# Patient Record
Sex: MALE | Race: WHITE | Employment: FULL TIME | URBAN - NONMETROPOLITAN AREA
[De-identification: names, ages, dates, MRNs, and addresses within clinical notes are randomized per-mention and may not be internally consistent; named-entity substitution may affect disease eponyms.]

---

## 2017-08-24 VITALS
DIASTOLIC BLOOD PRESSURE: 96 MMHG | SYSTOLIC BLOOD PRESSURE: 130 MMHG | HEIGHT: 70 IN | BODY MASS INDEX: 42.52 KG/M2 | WEIGHT: 297 LBS | HEART RATE: 80 BPM

## 2017-08-24 DIAGNOSIS — E03.9 ACQUIRED HYPOTHYROIDISM: ICD-10-CM

## 2017-08-24 DIAGNOSIS — E66.01 MORBID OBESITY DUE TO EXCESS CALORIES (HCC): ICD-10-CM

## 2017-08-24 DIAGNOSIS — G47.33 OBSTRUCTIVE SLEEP APNEA: ICD-10-CM

## 2017-08-24 RX ORDER — LEVOTHYROXINE SODIUM 0.05 MG/1
50 TABLET ORAL DAILY
COMMUNITY
End: 2017-09-26 | Stop reason: ALTCHOICE

## 2017-08-25 ENCOUNTER — OFFICE VISIT (OUTPATIENT)
Dept: FAMILY MEDICINE CLINIC | Age: 22
End: 2017-08-25
Payer: COMMERCIAL

## 2017-08-25 VITALS
DIASTOLIC BLOOD PRESSURE: 90 MMHG | HEART RATE: 88 BPM | SYSTOLIC BLOOD PRESSURE: 124 MMHG | BODY MASS INDEX: 43.65 KG/M2 | WEIGHT: 313 LBS

## 2017-08-25 DIAGNOSIS — E78.2 MIXED HYPERLIPIDEMIA: ICD-10-CM

## 2017-08-25 DIAGNOSIS — E11.65 TYPE 2 DIABETES MELLITUS WITH HYPERGLYCEMIA, WITHOUT LONG-TERM CURRENT USE OF INSULIN (HCC): Primary | ICD-10-CM

## 2017-08-25 DIAGNOSIS — E03.9 ACQUIRED HYPOTHYROIDISM: ICD-10-CM

## 2017-08-25 DIAGNOSIS — E66.01 MORBID OBESITY DUE TO EXCESS CALORIES (HCC): ICD-10-CM

## 2017-08-25 DIAGNOSIS — G47.33 OBSTRUCTIVE SLEEP APNEA: ICD-10-CM

## 2017-08-25 DIAGNOSIS — Z23 NEED FOR INFLUENZA VACCINATION: ICD-10-CM

## 2017-08-25 LAB
CHOLESTEROL/HDL RATIO: 6.6 RATIO
CHOLESTEROL: 230 MG/DL
CREATININE, RANDOM: 204.3 MG/DL
HDL, DIRECT: 35 MG/DL
LDL CHOLESTEROL CALCULATED: 107.8 MG/DL
MICROALBUMIN UR-MCNC: 3 MG/DL
MICROALBUMIN/CREAT UR-RTO: 14.7 MCG/MG CR
TRIGL SERPL-MCNC: 436 MG/DL
TSH REFLEX FT4: 4.31 MIU/ML
VLDLC SERPL CALC-MCNC: 87 MG/DL

## 2017-08-25 PROCEDURE — 90471 IMMUNIZATION ADMIN: CPT | Performed by: FAMILY MEDICINE

## 2017-08-25 PROCEDURE — 99214 OFFICE O/P EST MOD 30 MIN: CPT | Performed by: FAMILY MEDICINE

## 2017-08-25 PROCEDURE — 90686 IIV4 VACC NO PRSV 0.5 ML IM: CPT | Performed by: FAMILY MEDICINE

## 2017-08-25 RX ORDER — ATORVASTATIN CALCIUM 10 MG/1
10 TABLET, FILM COATED ORAL DAILY
Qty: 30 TABLET | Refills: 3 | Status: SHIPPED | OUTPATIENT
Start: 2017-08-25 | End: 2018-01-22 | Stop reason: SDUPTHER

## 2017-08-25 RX ORDER — METFORMIN HYDROCHLORIDE EXTENDED-RELEASE TABLETS 500 MG/1
500 TABLET, FILM COATED, EXTENDED RELEASE ORAL
Qty: 30 TABLET | Refills: 3 | Status: SHIPPED | OUTPATIENT
Start: 2017-08-25 | End: 2018-01-04 | Stop reason: CLARIF

## 2017-08-25 RX ORDER — LISINOPRIL 2.5 MG/1
2.5 TABLET ORAL DAILY
Qty: 30 TABLET | Refills: 3 | Status: SHIPPED | OUTPATIENT
Start: 2017-08-25 | End: 2018-01-22 | Stop reason: SDUPTHER

## 2017-08-27 ASSESSMENT — ENCOUNTER SYMPTOMS
DIARRHEA: 1
ABDOMINAL PAIN: 1
RESPIRATORY NEGATIVE: 1
NAUSEA: 1
BLOOD IN STOOL: 0

## 2017-09-26 ENCOUNTER — OFFICE VISIT (OUTPATIENT)
Dept: FAMILY MEDICINE CLINIC | Age: 22
End: 2017-09-26
Payer: COMMERCIAL

## 2017-09-26 VITALS
OXYGEN SATURATION: 96 % | SYSTOLIC BLOOD PRESSURE: 124 MMHG | TEMPERATURE: 97.4 F | BODY MASS INDEX: 40.73 KG/M2 | WEIGHT: 292 LBS | DIASTOLIC BLOOD PRESSURE: 84 MMHG | HEART RATE: 114 BPM

## 2017-09-26 DIAGNOSIS — E03.9 ACQUIRED HYPOTHYROIDISM: ICD-10-CM

## 2017-09-26 DIAGNOSIS — E66.01 MORBID OBESITY DUE TO EXCESS CALORIES (HCC): ICD-10-CM

## 2017-09-26 DIAGNOSIS — G47.33 OBSTRUCTIVE SLEEP APNEA: ICD-10-CM

## 2017-09-26 DIAGNOSIS — E11.65 TYPE 2 DIABETES MELLITUS WITH HYPERGLYCEMIA, UNSPECIFIED LONG TERM INSULIN USE STATUS: Primary | ICD-10-CM

## 2017-09-26 DIAGNOSIS — E11.65 TYPE 2 DIABETES MELLITUS WITH HYPERGLYCEMIA, WITHOUT LONG-TERM CURRENT USE OF INSULIN (HCC): ICD-10-CM

## 2017-09-26 LAB
BILIRUBIN, POC: NORMAL
BLOOD URINE, POC: NORMAL
CLARITY, POC: CLEAR
COLOR, POC: YELLOW
GLUCOSE BLD-MCNC: 554 MG/DL
GLUCOSE URINE, POC: NORMAL
HBA1C MFR BLD: 10.3 %
KETONES, POC: NORMAL
LEUKOCYTE EST, POC: NORMAL
NITRITE, POC: NORMAL
PH, POC: 5
PROTEIN, POC: NORMAL
SPECIFIC GRAVITY, POC: 1.01
UROBILINOGEN, POC: NORMAL

## 2017-09-26 PROCEDURE — 83036 HEMOGLOBIN GLYCOSYLATED A1C: CPT | Performed by: FAMILY MEDICINE

## 2017-09-26 PROCEDURE — 82962 GLUCOSE BLOOD TEST: CPT | Performed by: FAMILY MEDICINE

## 2017-09-26 PROCEDURE — 99214 OFFICE O/P EST MOD 30 MIN: CPT | Performed by: FAMILY MEDICINE

## 2017-09-26 PROCEDURE — 81002 URINALYSIS NONAUTO W/O SCOPE: CPT | Performed by: FAMILY MEDICINE

## 2017-09-26 RX ORDER — BLOOD-GLUCOSE METER
1 KIT MISCELLANEOUS DAILY
Qty: 1 KIT | Refills: 0 | Status: SHIPPED | OUTPATIENT
Start: 2017-09-26

## 2017-09-26 ASSESSMENT — ENCOUNTER SYMPTOMS: RESPIRATORY NEGATIVE: 1

## 2017-10-09 ENCOUNTER — OFFICE VISIT (OUTPATIENT)
Dept: FAMILY MEDICINE CLINIC | Age: 22
End: 2017-10-09
Payer: COMMERCIAL

## 2017-10-09 VITALS
HEART RATE: 68 BPM | SYSTOLIC BLOOD PRESSURE: 116 MMHG | BODY MASS INDEX: 40.59 KG/M2 | WEIGHT: 291 LBS | DIASTOLIC BLOOD PRESSURE: 88 MMHG

## 2017-10-09 DIAGNOSIS — R94.39 ABNORMAL NUCLEAR STRESS TEST: ICD-10-CM

## 2017-10-09 DIAGNOSIS — E66.01 MORBID OBESITY DUE TO EXCESS CALORIES (HCC): ICD-10-CM

## 2017-10-09 DIAGNOSIS — E11.65 TYPE 2 DIABETES MELLITUS WITH HYPERGLYCEMIA, WITH LONG-TERM CURRENT USE OF INSULIN (HCC): Primary | ICD-10-CM

## 2017-10-09 DIAGNOSIS — E03.9 ACQUIRED HYPOTHYROIDISM: ICD-10-CM

## 2017-10-09 DIAGNOSIS — E11.65 TYPE 2 DIABETES MELLITUS WITH HYPERGLYCEMIA, UNSPECIFIED LONG TERM INSULIN USE STATUS: ICD-10-CM

## 2017-10-09 DIAGNOSIS — Z79.4 TYPE 2 DIABETES MELLITUS WITH HYPERGLYCEMIA, WITH LONG-TERM CURRENT USE OF INSULIN (HCC): Primary | ICD-10-CM

## 2017-10-09 DIAGNOSIS — G47.33 OBSTRUCTIVE SLEEP APNEA: ICD-10-CM

## 2017-10-09 PROCEDURE — 99214 OFFICE O/P EST MOD 30 MIN: CPT | Performed by: FAMILY MEDICINE

## 2017-10-11 ASSESSMENT — ENCOUNTER SYMPTOMS
DIARRHEA: 0
BLOOD IN STOOL: 0
RESPIRATORY NEGATIVE: 1
CHOKING: 0
ABDOMINAL PAIN: 0
NAUSEA: 0

## 2017-10-11 NOTE — PROGRESS NOTES
2013    ARM SURGERY Left 2006    left arm reconstruction - fracture    UPPER GASTROINTESTINAL ENDOSCOPY  2014    [employer] for hematemesis; found hiatal hernia    UPPER GASTROINTESTINAL ENDOSCOPY  05/03/2016    moderate gastritis     Family History   Problem Relation Age of Onset    High Blood Pressure Mother     Heart Attack Mother     High Blood Pressure Father     Heart Attack Maternal Grandmother     Heart Attack Maternal Grandfather     Diabetes Maternal Grandfather      Social History   Substance Use Topics    Smoking status: Never Smoker    Smokeless tobacco: Former User      Comment: MIKE Domingo RRT 5/3/2016    Alcohol use 0.0 oz/week      Comment: occasional        Current Outpatient Prescriptions   Medication Sig Dispense Refill    insulin glargine (TOUJEO SOLOSTAR) 300 UNIT/ML injection pen Inject 35 Units into the skin nightly Increase by 3 units every 5 days until fasting sugar is less than 120 3 Pen 3    insulin aspart (NOVOLOG FLEXPEN) 100 UNIT/ML injection pen Use 3 units for every 50 above 150 before meals 5 Pen 3    glucose blood VI test strips (ASCENSIA AUTODISC VI;ONE TOUCH ULTRA TEST VI) strip Check blood 3 times daily before meals 100 each 3    insulin glargine (TOUJEO SOLOSTAR) 300 UNIT/ML injection pen Inject 20 units every evening; check sugars twice daily ; increase dose every 5 days by 3 units until fasting sugar is 100-150 (Patient taking differently: 35 Units nightly Inject 35 units every evening; check sugars twice daily ; increase dose every 5 days by 3 units until fasting sugar is 100-150) 1 Pen 0    Insulin Pen Needle 32G X 4 MM MISC 1 each by Does not apply route daily 100 each 3    glucose monitoring kit (FREESTYLE) monitoring kit 1 kit by Does not apply route daily 1 kit 0    metFORMIN, OSM, (FORTAMET) 500 MG extended release tablet Take 1 tablet by mouth daily (with breakfast) 30 tablet 3    lisinopril (PRINIVIL;ZESTRIL) 2.5 MG tablet Take 1 tablet by mouth daily 30 tablet 3    atorvastatin (LIPITOR) 10 MG tablet Take 1 tablet by mouth daily 30 tablet 3    aspirin 81 MG tablet Take 81 mg by mouth daily Indications: delayed release tablet      acetaminophen 650 MG TABS Take 650 mg by mouth every 4 hours as needed 120 tablet 0    omeprazole (PRILOSEC) 40 MG capsule Take 1 capsule twice daily for 2 weeks, then 1 tab daily 60 capsule 0     No current facility-administered medications for this visit. Allergies   Allergen Reactions    Cephalexin Hives    Sulfa Antibiotics Hives       Health Maintenance   Topic Date Due    Diabetic foot exam  10/07/2004    Diabetic retinal exam  10/07/2004    HIV screen  10/07/2009    DTaP/Tdap/Td vaccine (1 - Tdap) 10/07/2013    Pneumococcal med risk (1 of 1 - PPSV23) 10/07/2013    Diabetic hemoglobin A1C test  12/26/2017    Diabetic microalbuminuria test  08/25/2018    Lipid screen  08/25/2018    Flu vaccine  Completed       Subjective:      Review of Systems   Constitutional: Negative for appetite change, chills, diaphoresis and fever. HENT: Negative. Respiratory: Negative. Negative for choking. Cardiovascular: Negative for chest pain, palpitations and leg swelling. Gastrointestinal: Negative for abdominal pain, blood in stool, diarrhea and nausea (improving). Endocrine: Negative for cold intolerance, heat intolerance, polydipsia, polyphagia and polyuria. Objective:     /88   Pulse 68   Wt 291 lb (132 kg)   BMI 40.59 kg/m²     Physical Exam   Constitutional: He appears well-developed and well-nourished. No distress. obese   HENT:   Mouth/Throat: No oropharyngeal exudate. Constricted oropharynx   Neck: Neck supple. Carotid bruit is not present. Cardiovascular: Regular rhythm, S1 normal and S2 normal.    No murmur heard. Pulmonary/Chest: Breath sounds normal. He has no wheezes. He has no rhonchi. He has no rales. Abdominal: Soft. Bowel sounds are normal. There is no hepatosplenomegaly. Lymphadenopathy:     He has no axillary adenopathy. Vitals reviewed. Assessment:     1. Type 2 diabetes mellitus with hyperglycemia, with long-term current use of insulin ([facility])  insulin glargine (TOUJEO SOLOSTAR) 300 UNIT/ML injection pen    insulin aspart (NOVOLOG FLEXPEN) 100 UNIT/ML injection pen    External Referral To Diabetic Education   2. Type 2 diabetes mellitus with hyperglycemia, unspecified long term insulin use status  glucose blood VI test strips (ASCENSIA AUTODISC VI;ONE TOUCH ULTRA TEST VI) strip   3. Acquired hypothyroidism     4. Morbid obesity due to excess calories ([address]     5. Obstructive sleep apnea      referral made to Dr Shin Solorio   6. Abnormal nuclear stress test      2015            POC Testing Results (If Applicable):  No results found for this visit on 10/09/17. Plan:      At this point in time we will need to add short acting insulin for meals  Want before meals only ; not before bedtime  Will also increased toujeo  Reviewed how to increase toujeo every 5 days; 3 units every 5 days until FBS   Reviewed sliding scale coverage AC only   Refer to diabetic education/dietician   Orders Given:  Orders Placed This Encounter   Procedures    External Referral To Diabetic Education     Referral Priority:   Routine     Referral Type:   Consult for Advice and Opinion     Referral Reason:   Specialty Services Required     Requested Specialty:   Dietitian     Number of Visits Requested:   1     Prescriptions:    Orders Placed This Encounter   Medications    insulin glargine (TOUJEO SOLOSTAR) 300 UNIT/ML injection pen     Sig: Inject 35 Units into the skin nightly Increase by 3 units every 5 days until fasting sugar is less than 120     Dispense:  3 Pen     Refill:  3    insulin aspart (NOVOLOG FLEXPEN) 100 UNIT/ML injection pen     Sig: Use 3 units for every 50 above 150 before meals     Dispense:  5 Pen     Refill:  3    glucose blood VI test strips ([address] ULTRA TEST VI) strip     Sig: Check blood 3 times daily before meals     Dispense:  100 each     Refill:  3        Return in about 5 weeks (around 11/13/2017). Electronically signed by Zac Cosby MD on 10/11/2017.

## 2017-12-13 DIAGNOSIS — Z79.4 TYPE 2 DIABETES MELLITUS WITH HYPERGLYCEMIA, WITH LONG-TERM CURRENT USE OF INSULIN (HCC): ICD-10-CM

## 2017-12-13 DIAGNOSIS — E11.65 TYPE 2 DIABETES MELLITUS WITH HYPERGLYCEMIA, WITH LONG-TERM CURRENT USE OF INSULIN (HCC): ICD-10-CM

## 2017-12-13 NOTE — TELEPHONE ENCOUNTER
Josi Young is calling to request a refill on the following medication(s):  Requested Prescriptions     Pending Prescriptions Disp Refills    insulin aspart (NOVOLOG FLEXPEN) 100 UNIT/ML injection pen 5 pen 3     Sig: Use 3 units for every 50 above 150 before meals       Last Visit Date (If Applicable):  25/2/6232    Next Visit Date:    Visit date not found

## 2017-12-14 ENCOUNTER — OFFICE VISIT (OUTPATIENT)
Dept: FAMILY MEDICINE CLINIC | Age: 22
End: 2017-12-14
Payer: COMMERCIAL

## 2017-12-14 VITALS
BODY MASS INDEX: 39.2 KG/M2 | HEIGHT: 71 IN | DIASTOLIC BLOOD PRESSURE: 78 MMHG | HEART RATE: 100 BPM | SYSTOLIC BLOOD PRESSURE: 124 MMHG | WEIGHT: 280 LBS

## 2017-12-14 DIAGNOSIS — G47.33 OBSTRUCTIVE SLEEP APNEA: ICD-10-CM

## 2017-12-14 DIAGNOSIS — Z79.4 TYPE 2 DIABETES MELLITUS WITH HYPERGLYCEMIA, WITH LONG-TERM CURRENT USE OF INSULIN (HCC): ICD-10-CM

## 2017-12-14 DIAGNOSIS — K92.0 GASTROINTESTINAL HEMORRHAGE WITH HEMATEMESIS: ICD-10-CM

## 2017-12-14 DIAGNOSIS — E11.8 TYPE 2 DIABETES MELLITUS WITH COMPLICATION, WITH LONG-TERM CURRENT USE OF INSULIN (HCC): Primary | ICD-10-CM

## 2017-12-14 DIAGNOSIS — E03.9 ACQUIRED HYPOTHYROIDISM: ICD-10-CM

## 2017-12-14 DIAGNOSIS — E66.01 MORBID OBESITY DUE TO EXCESS CALORIES (HCC): ICD-10-CM

## 2017-12-14 DIAGNOSIS — K92.2 GASTROINTESTINAL HEMORRHAGE, UNSPECIFIED GASTROINTESTINAL HEMORRHAGE TYPE: ICD-10-CM

## 2017-12-14 DIAGNOSIS — Z79.4 TYPE 2 DIABETES MELLITUS WITH COMPLICATION, WITH LONG-TERM CURRENT USE OF INSULIN (HCC): Primary | ICD-10-CM

## 2017-12-14 DIAGNOSIS — E11.65 TYPE 2 DIABETES MELLITUS WITH HYPERGLYCEMIA, WITH LONG-TERM CURRENT USE OF INSULIN (HCC): ICD-10-CM

## 2017-12-14 LAB — HBA1C MFR BLD: 13.3 %

## 2017-12-14 PROCEDURE — 99214 OFFICE O/P EST MOD 30 MIN: CPT | Performed by: FAMILY MEDICINE

## 2017-12-14 PROCEDURE — 83036 HEMOGLOBIN GLYCOSYLATED A1C: CPT | Performed by: FAMILY MEDICINE

## 2017-12-14 NOTE — PROGRESS NOTES
GERD (gastroesophageal reflux disease)     Hyperlipidemia     Hypothyroidism       Past Surgical History:   Procedure Laterality Date    APPENDECTOMY  2013    ARM SURGERY Left 2006    left arm reconstruction - fracture    UPPER GASTROINTESTINAL ENDOSCOPY  2014    [facility] for hematemesis; found hiatal hernia    UPPER GASTROINTESTINAL ENDOSCOPY  05/03/2016    moderate gastritis     Family History   Problem Relation Age of Onset    High Blood Pressure Mother     Heart Attack Mother     High Blood Pressure Father     Heart Attack Maternal Grandmother     Heart Attack Maternal Grandfather     Diabetes Maternal Grandfather      Social History   Substance Use Topics    Smoking status: Never Smoker    Smokeless tobacco: Former User      Comment: MIKE Domingo RRT 5/3/2016    Alcohol use 0.0 oz/week      Comment: occasional        Current Outpatient Prescriptions   Medication Sig Dispense Refill    insulin aspart (NOVOLOG FLEXPEN) 100 UNIT/ML injection pen Use 5 units for every 50 above 150 before meals.  Max dose is: 100 units /day 10 pen 3    dapagliflozin (FARXIGA) 10 MG tablet Take 1 tablet by mouth every morning 30 tablet 3    glucose blood VI test strips (ASCENSIA AUTODISC VI;ONE TOUCH ULTRA TEST VI) strip Check blood 3 times daily before meals 100 each 3    insulin glargine (TOUJEO SOLOSTAR) 300 UNIT/ML injection pen Inject 20 units every evening; check sugars twice daily ; increase dose every 5 days by 3 units until fasting sugar is 100-150 (Patient taking differently: 35 Units nightly Inject 50 units every evening; check sugars twice daily ; increase dose every 5 days by 3 units until fasting sugar is 100-150) 1 Pen 0    Insulin Pen Needle 32G X 4 MM MISC 1 each by Does not apply route daily 100 each 3    glucose monitoring kit (FREESTYLE) monitoring kit 1 kit by Does not apply route daily 1 kit 0    metFORMIN, OSM, (FORTAMET) 500 MG extended release tablet Take 1 tablet by mouth daily (with supple. Carotid bruit is not present. Cardiovascular: Regular rhythm, S1 normal and S2 normal.    No murmur heard. Pulmonary/Chest: Breath sounds normal. He has no wheezes. He has no rhonchi. He has no rales. Abdominal: Soft. Bowel sounds are normal. There is no hepatosplenomegaly. Lymphadenopathy:     He has no axillary adenopathy. Vitals reviewed. Assessment:     1. Type 2 diabetes mellitus with complication, with long-term current use of insulin ([facility])  POCT glycosylated hemoglobin (Hb A1C)   2. Type 2 diabetes mellitus with hyperglycemia, with long-term current use of insulin ([facility])  insulin aspart (NOVOLOG FLEXPEN) 100 UNIT/ML injection pen    dapagliflozin (FARXIGA) 10 MG tablet   3. Acquired hypothyroidism     4. Gastrointestinal hemorrhage with hematemesis     5. Gastrointestinal hemorrhage, unspecified gastrointestinal hemorrhage type     6. Morbid obesity due to excess calories ([address]     7. Obstructive sleep apnea              POC Testing Results (If Applicable):  Results for POC orders placed in visit on 12/14/17   POCT glycosylated hemoglobin (Hb A1C)   Result Value Ref Range    Hemoglobin A1C 13.3 %       Plan:   Increase SS insulin ; Check blood sugars around 3 AM to rule out nocturnal hypoglycemia   Start farxiga  If not gaining control shortly - endocrinology referrral   Find out about diabetic ed and pulmonology referral         Orders Given:  Orders Placed This Encounter   Procedures    POCT glycosylated hemoglobin (Hb A1C)     Prescriptions:    Orders Placed This Encounter   Medications    insulin aspart (NOVOLOG FLEXPEN) 100 UNIT/ML injection pen     Sig: Use 5 units for every 50 above 150 before meals. Max dose is: 100 units /day     Dispense:  10 pen     Refill:  3    dapagliflozin (FARXIGA) 10 MG tablet     Sig: Take 1 tablet by mouth every morning     Dispense:  30 tablet     Refill:  3        Return in about 3 weeks (around 1/4/2018).       Electronically signed by Minnie Bence

## 2017-12-15 ENCOUNTER — TELEPHONE (OUTPATIENT)
Dept: FAMILY MEDICINE CLINIC | Age: 22
End: 2017-12-15

## 2017-12-15 DIAGNOSIS — E11.65 TYPE 2 DIABETES MELLITUS WITH HYPERGLYCEMIA, WITH LONG-TERM CURRENT USE OF INSULIN (HCC): Primary | ICD-10-CM

## 2017-12-15 DIAGNOSIS — Z79.4 TYPE 2 DIABETES MELLITUS WITH HYPERGLYCEMIA, WITH LONG-TERM CURRENT USE OF INSULIN (HCC): Primary | ICD-10-CM

## 2017-12-18 ASSESSMENT — ENCOUNTER SYMPTOMS
BLOOD IN STOOL: 0
DIARRHEA: 0
NAUSEA: 0
CHOKING: 0
ABDOMINAL PAIN: 0
RESPIRATORY NEGATIVE: 1

## 2017-12-21 RX ORDER — METFORMIN HYDROCHLORIDE 500 MG/1
TABLET, EXTENDED RELEASE ORAL
Qty: 30 TABLET | Refills: 3 | Status: SHIPPED | OUTPATIENT
Start: 2017-12-21 | End: 2018-04-05 | Stop reason: SDUPTHER

## 2017-12-21 NOTE — TELEPHONE ENCOUNTER
Satish Thompson is calling to request a refill on the following medication(s):  Requested Prescriptions     Pending Prescriptions Disp Refills    metFORMIN (GLUCOPHAGE-XR) 500 MG extended release tablet [Pharmacy Med Name: MetFORMIN ER 500MG  TAB] 30 tablet 3     Sig: TAKE ONE TABLET BY MOUTH ONCE DAILY WITH  BREAKFAST       Last Visit Date (If Applicable):  75/35/9212    Next Visit Date:    1/4/2018

## 2017-12-22 ENCOUNTER — TELEPHONE (OUTPATIENT)
Dept: FAMILY MEDICINE CLINIC | Age: 22
End: 2017-12-22

## 2017-12-27 NOTE — TELEPHONE ENCOUNTER
Spoke with [employer] today. She did say she remembers that ref coming on the fax machine but the pt was  Not seen. She stated that she wanted another copy of the ref. I did fax ref and info again.

## 2017-12-27 NOTE — TELEPHONE ENCOUNTER
All info was in the notes of ref. Pt was scheduled back in august with Dr. Nusrat Cox. appt was supposed to be for 9/26 at 9:40. I can call Dr. Tiffanie Valdes office to see if the pt did show up for his appt.

## 2018-01-04 ENCOUNTER — OFFICE VISIT (OUTPATIENT)
Dept: FAMILY MEDICINE CLINIC | Age: 23
End: 2018-01-04
Payer: COMMERCIAL

## 2018-01-04 VITALS
WEIGHT: 283 LBS | BODY MASS INDEX: 39.47 KG/M2 | SYSTOLIC BLOOD PRESSURE: 120 MMHG | HEART RATE: 68 BPM | DIASTOLIC BLOOD PRESSURE: 84 MMHG

## 2018-01-04 DIAGNOSIS — G47.33 OBSTRUCTIVE SLEEP APNEA: ICD-10-CM

## 2018-01-04 DIAGNOSIS — E78.2 MIXED HYPERLIPIDEMIA: ICD-10-CM

## 2018-01-04 DIAGNOSIS — Z79.4 TYPE 2 DIABETES MELLITUS WITH COMPLICATION, WITH LONG-TERM CURRENT USE OF INSULIN (HCC): Primary | ICD-10-CM

## 2018-01-04 DIAGNOSIS — E11.8 TYPE 2 DIABETES MELLITUS WITH COMPLICATION, WITH LONG-TERM CURRENT USE OF INSULIN (HCC): Primary | ICD-10-CM

## 2018-01-04 DIAGNOSIS — E03.9 ACQUIRED HYPOTHYROIDISM: ICD-10-CM

## 2018-01-04 PROCEDURE — 99214 OFFICE O/P EST MOD 30 MIN: CPT | Performed by: FAMILY MEDICINE

## 2018-01-07 ASSESSMENT — ENCOUNTER SYMPTOMS
ABDOMINAL PAIN: 0
BLOOD IN STOOL: 0
RESPIRATORY NEGATIVE: 1
CHOKING: 0
NAUSEA: 0
DIARRHEA: 0

## 2018-01-07 NOTE — PROGRESS NOTES
Cephalexin Hives    Sulfa Antibiotics Hives       Health Maintenance   Topic Date Due    Diabetic foot exam  10/07/2004    Diabetic retinal exam  10/07/2004    DTaP/Tdap/Td vaccine (1 - Tdap) 10/07/2013    Pneumococcal med risk (1 of 1 - PPSV23) 10/07/2013    HIV screen  12/14/2027 (Originally 10/7/2009)    A1C test (Diabetic or Prediabetic)  03/14/2018    Potassium monitoring  08/21/2018    Creatinine monitoring  08/21/2018    Diabetic microalbuminuria test  08/25/2018    Lipid screen  08/25/2018    TSH testing  08/25/2018    Flu vaccine  Completed       Subjective:      Review of Systems   Constitutional: Negative for appetite change, chills, diaphoresis and fever. HENT: Negative. Respiratory: Negative. Negative for choking. Cardiovascular: Negative for chest pain, palpitations and leg swelling. Gastrointestinal: Negative for abdominal pain, blood in stool, diarrhea and nausea (improving). Endocrine: Negative for cold intolerance, heat intolerance, polydipsia, polyphagia and polyuria. Objective:     /84   Pulse 68   Wt 283 lb (128.4 kg)   BMI 39.47 kg/m²     Physical Exam   Constitutional: He appears well-developed and well-nourished. No distress. obese   HENT:   Mouth/Throat: No oropharyngeal exudate. Constricted oropharynx   Neck: Neck supple. Carotid bruit is not present. Cardiovascular: Regular rhythm, S1 normal and S2 normal.    No murmur heard. Pulmonary/Chest: Breath sounds normal. He has no wheezes. He has no rhonchi. He has no rales. Abdominal: Soft. Bowel sounds are normal. There is no hepatosplenomegaly. Lymphadenopathy:     He has no axillary adenopathy. Vitals reviewed. Assessment:     1. Type 2 diabetes mellitus with complication, with long-term current use of insulin ([facility])  insulin glargine (TOUJEO SOLOSTAR) 300 UNIT/ML injection pen   2. Obstructive sleep apnea  External Referral To Pulmonology   3. Acquired hypothyroidism     4.  Mixed hyperlipidemia  Lipid Panel            POC Testing Results (If Applicable):  No results found for this visit on 01/04/18. Plan:   Discussed hypoglycemia and need to carry glucose   We will make referral to Dr Tatum Sibley  We make sure he sees dietician       Orders Given:  Orders Placed This Encounter   Procedures    Lipid Panel     Standing Status:   Future     Standing Expiration Date:   1/4/2019     Order Specific Question:   Is Patient Fasting?/# of Hours     Answer:   Yes, 12 hours    External Referral To Pulmonology     Referral Priority:   Routine     Referral Type:   Consult for Advice and Opinion     Referral Reason:   Specialty Services Required     Referred to Provider:   Uli Cook MD     Requested Specialty:   Pulmonary Disease     Number of Visits Requested:   1     Prescriptions:    Orders Placed This Encounter   Medications    insulin glargine (TOUJEO SOLOSTAR) 300 UNIT/ML injection pen     Sig: Inject 70 Units into the skin nightly     Dispense:  3 pen     Refill:  3        Return in about 3 months (around 4/4/2018). Electronically signed by Adry Louis MD on 1/7/2018.

## 2018-01-08 ENCOUNTER — TELEPHONE (OUTPATIENT)
Dept: FAMILY MEDICINE CLINIC | Age: 23
End: 2018-01-08

## 2018-01-22 ENCOUNTER — TELEPHONE (OUTPATIENT)
Dept: FAMILY MEDICINE CLINIC | Age: 23
End: 2018-01-22

## 2018-01-22 DIAGNOSIS — E11.65 TYPE 2 DIABETES MELLITUS WITH HYPERGLYCEMIA, WITHOUT LONG-TERM CURRENT USE OF INSULIN (HCC): ICD-10-CM

## 2018-01-22 DIAGNOSIS — E78.2 MIXED HYPERLIPIDEMIA: ICD-10-CM

## 2018-01-22 RX ORDER — LISINOPRIL 2.5 MG/1
TABLET ORAL
Qty: 30 TABLET | Refills: 0 | Status: SHIPPED | OUTPATIENT
Start: 2018-01-22 | End: 2018-03-01 | Stop reason: SDUPTHER

## 2018-01-22 RX ORDER — ATORVASTATIN CALCIUM 10 MG/1
TABLET, FILM COATED ORAL
Qty: 30 TABLET | Refills: 0 | Status: SHIPPED | OUTPATIENT
Start: 2018-01-22 | End: 2019-01-15 | Stop reason: DRUGHIGH

## 2018-01-22 NOTE — TELEPHONE ENCOUNTER
luma wanted to let you know that she found Philip referral down in registration. He does have an appointment on Friday.

## 2018-01-23 LAB
CHOLESTEROL/HDL RATIO: 5 RATIO
CHOLESTEROL: 170 MG/DL
HDL, DIRECT: 34 MG/DL
LDL CHOLESTEROL CALCULATED: 89.2 MG/DL
TRIGL SERPL-MCNC: 234 MG/DL
VLDLC SERPL CALC-MCNC: 47 MG/DL

## 2018-01-31 DIAGNOSIS — E78.5 HYPERLIPIDEMIA, UNSPECIFIED HYPERLIPIDEMIA TYPE: Primary | ICD-10-CM

## 2018-01-31 RX ORDER — ATORVASTATIN CALCIUM 10 MG/1
10 TABLET, FILM COATED ORAL DAILY
Qty: 90 TABLET | Refills: 3 | Status: SHIPPED | OUTPATIENT
Start: 2018-01-31 | End: 2018-04-05 | Stop reason: CLARIF

## 2018-02-26 ENCOUNTER — TELEPHONE (OUTPATIENT)
Dept: FAMILY MEDICINE CLINIC | Age: 23
End: 2018-02-26

## 2018-03-01 DIAGNOSIS — E11.65 TYPE 2 DIABETES MELLITUS WITH HYPERGLYCEMIA, WITHOUT LONG-TERM CURRENT USE OF INSULIN (HCC): ICD-10-CM

## 2018-03-01 RX ORDER — LISINOPRIL 2.5 MG/1
TABLET ORAL
Qty: 30 TABLET | Refills: 0 | Status: SHIPPED | OUTPATIENT
Start: 2018-03-01 | End: 2018-06-12 | Stop reason: SDUPTHER

## 2018-03-01 NOTE — TELEPHONE ENCOUNTER
[facility] is calling to request a refill on the following medication(s):  Requested Prescriptions     Pending Prescriptions Disp Refills    lisinopril (PRINIVIL;ZESTRIL) 2.5 MG tablet [Pharmacy Med Name: LISINOPRIL 2.5MG    TAB] 30 tablet 0     Sig: TAKE ONE TABLET BY MOUTH ONCE DAILY       Last Visit Date (If Applicable):  5/4/6843    Next Visit Date:    4/5/2018

## 2018-04-05 ENCOUNTER — OFFICE VISIT (OUTPATIENT)
Dept: FAMILY MEDICINE CLINIC | Age: 23
End: 2018-04-05
Payer: MEDICARE

## 2018-04-05 VITALS
HEART RATE: 72 BPM | SYSTOLIC BLOOD PRESSURE: 122 MMHG | WEIGHT: 304 LBS | BODY MASS INDEX: 42.4 KG/M2 | DIASTOLIC BLOOD PRESSURE: 90 MMHG

## 2018-04-05 DIAGNOSIS — S43.302A SUBLUXATION OF LEFT SHOULDER GIRDLE, INITIAL ENCOUNTER: ICD-10-CM

## 2018-04-05 DIAGNOSIS — E03.9 ACQUIRED HYPOTHYROIDISM: ICD-10-CM

## 2018-04-05 DIAGNOSIS — K92.0 GASTROINTESTINAL HEMORRHAGE WITH HEMATEMESIS: ICD-10-CM

## 2018-04-05 DIAGNOSIS — E66.01 MORBID OBESITY DUE TO EXCESS CALORIES (HCC): ICD-10-CM

## 2018-04-05 DIAGNOSIS — Z79.4 TYPE 2 DIABETES MELLITUS WITH HYPERGLYCEMIA, WITH LONG-TERM CURRENT USE OF INSULIN (HCC): Primary | ICD-10-CM

## 2018-04-05 DIAGNOSIS — G47.33 OBSTRUCTIVE SLEEP APNEA: ICD-10-CM

## 2018-04-05 DIAGNOSIS — E11.65 TYPE 2 DIABETES MELLITUS WITH HYPERGLYCEMIA, WITH LONG-TERM CURRENT USE OF INSULIN (HCC): Primary | ICD-10-CM

## 2018-04-05 LAB — HBA1C MFR BLD: 7.5 %

## 2018-04-05 PROCEDURE — 83036 HEMOGLOBIN GLYCOSYLATED A1C: CPT | Performed by: FAMILY MEDICINE

## 2018-04-05 PROCEDURE — 1036F TOBACCO NON-USER: CPT | Performed by: FAMILY MEDICINE

## 2018-04-05 PROCEDURE — G8427 DOCREV CUR MEDS BY ELIG CLIN: HCPCS | Performed by: FAMILY MEDICINE

## 2018-04-05 PROCEDURE — G8417 CALC BMI ABV UP PARAM F/U: HCPCS | Performed by: FAMILY MEDICINE

## 2018-04-05 PROCEDURE — 99214 OFFICE O/P EST MOD 30 MIN: CPT | Performed by: FAMILY MEDICINE

## 2018-04-05 PROCEDURE — 3045F PR MOST RECENT HEMOGLOBIN A1C LEVEL 7.0-9.0%: CPT | Performed by: FAMILY MEDICINE

## 2018-04-05 RX ORDER — METFORMIN HYDROCHLORIDE 500 MG/1
500 TABLET, EXTENDED RELEASE ORAL
Qty: 90 TABLET | Refills: 3 | Status: SHIPPED | OUTPATIENT
Start: 2018-04-05 | End: 2019-04-16 | Stop reason: SDUPTHER

## 2018-04-05 ASSESSMENT — PATIENT HEALTH QUESTIONNAIRE - PHQ9
1. LITTLE INTEREST OR PLEASURE IN DOING THINGS: 0
2. FEELING DOWN, DEPRESSED OR HOPELESS: 0
SUM OF ALL RESPONSES TO PHQ9 QUESTIONS 1 & 2: 0
SUM OF ALL RESPONSES TO PHQ QUESTIONS 1-9: 0

## 2018-04-08 ASSESSMENT — ENCOUNTER SYMPTOMS
BLOOD IN STOOL: 0
CHOKING: 0
DIARRHEA: 0
NAUSEA: 0
ABDOMINAL PAIN: 0
RESPIRATORY NEGATIVE: 1

## 2018-05-02 DIAGNOSIS — Z79.4 TYPE 2 DIABETES MELLITUS WITH HYPERGLYCEMIA, WITH LONG-TERM CURRENT USE OF INSULIN (HCC): ICD-10-CM

## 2018-05-02 DIAGNOSIS — E11.65 TYPE 2 DIABETES MELLITUS WITH HYPERGLYCEMIA, WITH LONG-TERM CURRENT USE OF INSULIN (HCC): ICD-10-CM

## 2018-05-03 RX ORDER — DAPAGLIFLOZIN 10 MG/1
TABLET, FILM COATED ORAL
Qty: 30 TABLET | Refills: 3 | Status: SHIPPED | OUTPATIENT
Start: 2018-05-03 | End: 2018-11-20 | Stop reason: SDUPTHER

## 2018-05-10 DIAGNOSIS — S43.302S SUBLUXATION OF LEFT SHOULDER GIRDLE, SEQUELA: Primary | ICD-10-CM

## 2018-05-11 ENCOUNTER — OFFICE VISIT (OUTPATIENT)
Dept: ORTHOPEDIC SURGERY | Age: 23
End: 2018-05-11
Payer: COMMERCIAL

## 2018-05-11 VITALS
SYSTOLIC BLOOD PRESSURE: 126 MMHG | BODY MASS INDEX: 43.52 KG/M2 | HEIGHT: 70 IN | DIASTOLIC BLOOD PRESSURE: 88 MMHG | WEIGHT: 304 LBS

## 2018-05-11 DIAGNOSIS — M24.112 LABRAL TEAR OF SHOULDER, DEGENERATIVE, LEFT: Primary | ICD-10-CM

## 2018-05-11 PROCEDURE — 1036F TOBACCO NON-USER: CPT | Performed by: ORTHOPAEDIC SURGERY

## 2018-05-11 PROCEDURE — G8428 CUR MEDS NOT DOCUMENT: HCPCS | Performed by: ORTHOPAEDIC SURGERY

## 2018-05-11 PROCEDURE — 99203 OFFICE O/P NEW LOW 30 MIN: CPT | Performed by: ORTHOPAEDIC SURGERY

## 2018-05-11 PROCEDURE — G8417 CALC BMI ABV UP PARAM F/U: HCPCS | Performed by: ORTHOPAEDIC SURGERY

## 2018-05-25 ENCOUNTER — OFFICE VISIT (OUTPATIENT)
Dept: ORTHOPEDIC SURGERY | Age: 23
End: 2018-05-25
Payer: COMMERCIAL

## 2018-05-25 VITALS
OXYGEN SATURATION: 98 % | DIASTOLIC BLOOD PRESSURE: 84 MMHG | SYSTOLIC BLOOD PRESSURE: 136 MMHG | HEART RATE: 88 BPM | WEIGHT: 308 LBS | BODY MASS INDEX: 44.19 KG/M2

## 2018-05-25 DIAGNOSIS — M25.312 INSTABILITY OF LEFT SHOULDER JOINT: Primary | ICD-10-CM

## 2018-05-25 PROCEDURE — 99213 OFFICE O/P EST LOW 20 MIN: CPT | Performed by: ORTHOPAEDIC SURGERY

## 2018-06-04 DIAGNOSIS — Z01.818 PRE-OP EXAM: Primary | ICD-10-CM

## 2018-06-12 DIAGNOSIS — E11.65 TYPE 2 DIABETES MELLITUS WITH HYPERGLYCEMIA, WITHOUT LONG-TERM CURRENT USE OF INSULIN (HCC): ICD-10-CM

## 2018-06-13 RX ORDER — LISINOPRIL 2.5 MG/1
TABLET ORAL
Qty: 30 TABLET | Refills: 0 | Status: SHIPPED | OUTPATIENT
Start: 2018-06-13 | End: 2018-08-28 | Stop reason: SDUPTHER

## 2018-06-19 RX ORDER — CLINDAMYCIN PHOSPHATE 900 MG/50ML
900 INJECTION INTRAVENOUS ONCE
Status: DISCONTINUED | OUTPATIENT
Start: 2018-06-21 | End: 2018-06-21

## 2018-06-20 ENCOUNTER — ANESTHESIA EVENT (OUTPATIENT)
Dept: OPERATING ROOM | Age: 23
End: 2018-06-20
Payer: COMMERCIAL

## 2018-06-21 ENCOUNTER — HOSPITAL ENCOUNTER (OUTPATIENT)
Age: 23
Setting detail: OUTPATIENT SURGERY
Discharge: HOME | End: 2018-06-21
Attending: ORTHOPAEDIC SURGERY | Admitting: ORTHOPAEDIC SURGERY
Payer: COMMERCIAL

## 2018-06-21 ENCOUNTER — ANESTHESIA (OUTPATIENT)
Dept: OPERATING ROOM | Age: 23
End: 2018-06-21
Payer: COMMERCIAL

## 2018-06-21 VITALS
HEART RATE: 82 BPM | RESPIRATION RATE: 16 BRPM | BODY MASS INDEX: 41.8 KG/M2 | WEIGHT: 292 LBS | OXYGEN SATURATION: 95 % | TEMPERATURE: 97.5 F | HEIGHT: 70 IN | SYSTOLIC BLOOD PRESSURE: 114 MMHG | DIASTOLIC BLOOD PRESSURE: 61 MMHG

## 2018-06-21 VITALS — TEMPERATURE: 90.7 F | DIASTOLIC BLOOD PRESSURE: 106 MMHG | SYSTOLIC BLOOD PRESSURE: 120 MMHG | OXYGEN SATURATION: 95 %

## 2018-06-21 DIAGNOSIS — G89.18 POST-OP PAIN: Primary | ICD-10-CM

## 2018-06-21 LAB
GLUCOSE BLD-MCNC: 115 MG/DL (ref 75–110)
GLUCOSE BLD-MCNC: 147 MG/DL (ref 75–110)

## 2018-06-21 PROCEDURE — 6360000002 HC RX W HCPCS: Performed by: SPECIALIST

## 2018-06-21 PROCEDURE — 7100000000 HC PACU RECOVERY - FIRST 15 MIN: Performed by: ORTHOPAEDIC SURGERY

## 2018-06-21 PROCEDURE — 6360000002 HC RX W HCPCS: Performed by: ANESTHESIOLOGY

## 2018-06-21 PROCEDURE — 7100000001 HC PACU RECOVERY - ADDTL 15 MIN: Performed by: ORTHOPAEDIC SURGERY

## 2018-06-21 PROCEDURE — 3700000000 HC ANESTHESIA ATTENDED CARE: Performed by: ORTHOPAEDIC SURGERY

## 2018-06-21 PROCEDURE — 3600000004 HC SURGERY LEVEL 4 BASE: Performed by: ORTHOPAEDIC SURGERY

## 2018-06-21 PROCEDURE — 2500000003 HC RX 250 WO HCPCS: Performed by: ORTHOPAEDIC SURGERY

## 2018-06-21 PROCEDURE — 29820 SHO ARTHRS SRG PRTL SYNVCT: CPT | Performed by: ORTHOPAEDIC SURGERY

## 2018-06-21 PROCEDURE — 3700000001 HC ADD 15 MINUTES (ANESTHESIA): Performed by: ORTHOPAEDIC SURGERY

## 2018-06-21 PROCEDURE — 3600000014 HC SURGERY LEVEL 4 ADDTL 15MIN: Performed by: ORTHOPAEDIC SURGERY

## 2018-06-21 PROCEDURE — 7100000041 HC SPAR PHASE II RECOVERY - ADDTL 15 MIN: Performed by: ORTHOPAEDIC SURGERY

## 2018-06-21 PROCEDURE — 23466 REPAIR SHOULDER CAPSULE: CPT | Performed by: ORTHOPAEDIC SURGERY

## 2018-06-21 PROCEDURE — 2580000003 HC RX 258: Performed by: ANESTHESIOLOGY

## 2018-06-21 PROCEDURE — 6360000002 HC RX W HCPCS

## 2018-06-21 PROCEDURE — 2500000003 HC RX 250 WO HCPCS: Performed by: ANESTHESIOLOGY

## 2018-06-21 PROCEDURE — A6454 SELF-ADHER BAND W>=3" <5"/YD: HCPCS | Performed by: ORTHOPAEDIC SURGERY

## 2018-06-21 PROCEDURE — 2500000003 HC RX 250 WO HCPCS: Performed by: SPECIALIST

## 2018-06-21 PROCEDURE — 82947 ASSAY GLUCOSE BLOOD QUANT: CPT

## 2018-06-21 PROCEDURE — 7100000040 HC SPAR PHASE II RECOVERY - FIRST 15 MIN: Performed by: ORTHOPAEDIC SURGERY

## 2018-06-21 PROCEDURE — 64415 NJX AA&/STRD BRCH PLXS IMG: CPT | Performed by: ANESTHESIOLOGY

## 2018-06-21 RX ORDER — GLYCOPYRROLATE 1 MG/5 ML
SYRINGE (ML) INTRAVENOUS PRN
Status: DISCONTINUED | OUTPATIENT
Start: 2018-06-21 | End: 2018-06-21 | Stop reason: SDUPTHER

## 2018-06-21 RX ORDER — PROMETHAZINE HYDROCHLORIDE 25 MG/ML
6.25 INJECTION, SOLUTION INTRAMUSCULAR; INTRAVENOUS ONCE
Status: COMPLETED | OUTPATIENT
Start: 2018-06-21 | End: 2018-06-21

## 2018-06-21 RX ORDER — SODIUM CHLORIDE, SODIUM LACTATE, POTASSIUM CHLORIDE, CALCIUM CHLORIDE 600; 310; 30; 20 MG/100ML; MG/100ML; MG/100ML; MG/100ML
INJECTION, SOLUTION INTRAVENOUS CONTINUOUS
Status: DISCONTINUED | OUTPATIENT
Start: 2018-06-21 | End: 2018-06-21 | Stop reason: HOSPADM

## 2018-06-21 RX ORDER — FENTANYL CITRATE 50 UG/ML
INJECTION, SOLUTION INTRAMUSCULAR; INTRAVENOUS
Status: COMPLETED
Start: 2018-06-21 | End: 2018-06-21

## 2018-06-21 RX ORDER — BUPIVACAINE HYDROCHLORIDE 5 MG/ML
30 INJECTION, SOLUTION EPIDURAL; INTRACAUDAL ONCE
Status: COMPLETED | OUTPATIENT
Start: 2018-06-21 | End: 2018-06-21

## 2018-06-21 RX ORDER — PROMETHAZINE HYDROCHLORIDE 25 MG/ML
INJECTION, SOLUTION INTRAMUSCULAR; INTRAVENOUS
Status: DISCONTINUED
Start: 2018-06-21 | End: 2018-06-21

## 2018-06-21 RX ORDER — LIDOCAINE HYDROCHLORIDE 10 MG/ML
INJECTION, SOLUTION EPIDURAL; INFILTRATION; INTRACAUDAL; PERINEURAL PRN
Status: DISCONTINUED | OUTPATIENT
Start: 2018-06-21 | End: 2018-06-21 | Stop reason: SDUPTHER

## 2018-06-21 RX ORDER — ONDANSETRON 2 MG/ML
INJECTION INTRAMUSCULAR; INTRAVENOUS PRN
Status: DISCONTINUED | OUTPATIENT
Start: 2018-06-21 | End: 2018-06-21 | Stop reason: SDUPTHER

## 2018-06-21 RX ORDER — PROPOFOL 10 MG/ML
INJECTION, EMULSION INTRAVENOUS PRN
Status: DISCONTINUED | OUTPATIENT
Start: 2018-06-21 | End: 2018-06-21 | Stop reason: SDUPTHER

## 2018-06-21 RX ORDER — OXYCODONE HYDROCHLORIDE AND ACETAMINOPHEN 5; 325 MG/1; MG/1
1 TABLET ORAL EVERY 4 HOURS PRN
Qty: 35 TABLET | Refills: 0 | Status: SHIPPED | OUTPATIENT
Start: 2018-06-21 | End: 2018-06-28

## 2018-06-21 RX ORDER — OXYCODONE HYDROCHLORIDE AND ACETAMINOPHEN 5; 325 MG/1; MG/1
1 TABLET ORAL
Status: DISCONTINUED | OUTPATIENT
Start: 2018-06-21 | End: 2018-06-21 | Stop reason: HOSPADM

## 2018-06-21 RX ORDER — FENTANYL CITRATE 50 UG/ML
50 INJECTION, SOLUTION INTRAMUSCULAR; INTRAVENOUS EVERY 5 MIN PRN
Status: DISCONTINUED | OUTPATIENT
Start: 2018-06-21 | End: 2018-06-21 | Stop reason: HOSPADM

## 2018-06-21 RX ORDER — FENTANYL CITRATE 50 UG/ML
INJECTION, SOLUTION INTRAMUSCULAR; INTRAVENOUS
Status: DISCONTINUED
Start: 2018-06-21 | End: 2018-06-21 | Stop reason: HOSPADM

## 2018-06-21 RX ORDER — CLINDAMYCIN PHOSPHATE 900 MG/50ML
900 INJECTION INTRAVENOUS ONCE
Status: COMPLETED | OUTPATIENT
Start: 2018-06-21 | End: 2018-06-21

## 2018-06-21 RX ORDER — FENTANYL CITRATE 50 UG/ML
100 INJECTION, SOLUTION INTRAMUSCULAR; INTRAVENOUS ONCE
Status: COMPLETED | OUTPATIENT
Start: 2018-06-21 | End: 2018-06-21

## 2018-06-21 RX ORDER — LIDOCAINE HYDROCHLORIDE 10 MG/ML
1 INJECTION, SOLUTION EPIDURAL; INFILTRATION; INTRACAUDAL; PERINEURAL
Status: DISCONTINUED | OUTPATIENT
Start: 2018-06-21 | End: 2018-06-21 | Stop reason: HOSPADM

## 2018-06-21 RX ORDER — MIDAZOLAM HYDROCHLORIDE 1 MG/ML
INJECTION INTRAMUSCULAR; INTRAVENOUS
Status: COMPLETED
Start: 2018-06-21 | End: 2018-06-21

## 2018-06-21 RX ORDER — ROCURONIUM BROMIDE 10 MG/ML
INJECTION, SOLUTION INTRAVENOUS PRN
Status: DISCONTINUED | OUTPATIENT
Start: 2018-06-21 | End: 2018-06-21 | Stop reason: SDUPTHER

## 2018-06-21 RX ORDER — PROMETHAZINE HYDROCHLORIDE 25 MG/ML
6.25 INJECTION, SOLUTION INTRAMUSCULAR; INTRAVENOUS ONCE
Status: DISCONTINUED | OUTPATIENT
Start: 2018-06-21 | End: 2018-06-21 | Stop reason: HOSPADM

## 2018-06-21 RX ORDER — MIDAZOLAM HYDROCHLORIDE 1 MG/ML
2 INJECTION INTRAMUSCULAR; INTRAVENOUS ONCE
Status: COMPLETED | OUTPATIENT
Start: 2018-06-21 | End: 2018-06-21

## 2018-06-21 RX ADMIN — FENTANYL CITRATE 50 MCG: 50 INJECTION INTRAMUSCULAR; INTRAVENOUS at 13:35

## 2018-06-21 RX ADMIN — FENTANYL CITRATE 50 MCG: 50 INJECTION INTRAMUSCULAR; INTRAVENOUS at 13:30

## 2018-06-21 RX ADMIN — ONDANSETRON 4 MG: 2 INJECTION, SOLUTION INTRAMUSCULAR; INTRAVENOUS at 12:10

## 2018-06-21 RX ADMIN — PROMETHAZINE HYDROCHLORIDE 6.25 MG: 25 INJECTION INTRAMUSCULAR; INTRAVENOUS at 13:15

## 2018-06-21 RX ADMIN — Medication 10 ML: at 10:28

## 2018-06-21 RX ADMIN — SODIUM CHLORIDE, POTASSIUM CHLORIDE, SODIUM LACTATE AND CALCIUM CHLORIDE: 600; 310; 30; 20 INJECTION, SOLUTION INTRAVENOUS at 12:05

## 2018-06-21 RX ADMIN — NEOSTIGMINE METHYLSULFATE 4 MG: 1 INJECTION, SOLUTION INTRAMUSCULAR; INTRAVENOUS; SUBCUTANEOUS at 12:40

## 2018-06-21 RX ADMIN — SODIUM CHLORIDE, POTASSIUM CHLORIDE, SODIUM LACTATE AND CALCIUM CHLORIDE: 600; 310; 30; 20 INJECTION, SOLUTION INTRAVENOUS at 09:20

## 2018-06-21 RX ADMIN — FENTANYL CITRATE 50 MCG: 50 INJECTION INTRAMUSCULAR; INTRAVENOUS at 14:23

## 2018-06-21 RX ADMIN — MIDAZOLAM HYDROCHLORIDE 2 MG: 1 INJECTION INTRAMUSCULAR; INTRAVENOUS at 10:15

## 2018-06-21 RX ADMIN — SODIUM CHLORIDE, POTASSIUM CHLORIDE, SODIUM LACTATE AND CALCIUM CHLORIDE: 600; 310; 30; 20 INJECTION, SOLUTION INTRAVENOUS at 14:43

## 2018-06-21 RX ADMIN — FENTANYL CITRATE 100 MCG: 50 INJECTION INTRAMUSCULAR; INTRAVENOUS at 10:15

## 2018-06-21 RX ADMIN — LIDOCAINE HYDROCHLORIDE 50 MG: 10 INJECTION, SOLUTION EPIDURAL; INFILTRATION; INTRACAUDAL; PERINEURAL at 11:05

## 2018-06-21 RX ADMIN — ROCURONIUM BROMIDE 50 MG: 10 INJECTION INTRAVENOUS at 11:05

## 2018-06-21 RX ADMIN — Medication 0.6 MG: at 12:40

## 2018-06-21 RX ADMIN — FENTANYL CITRATE 100 MCG: 50 INJECTION, SOLUTION INTRAMUSCULAR; INTRAVENOUS at 10:15

## 2018-06-21 RX ADMIN — MIDAZOLAM HYDROCHLORIDE 2 MG: 1 INJECTION, SOLUTION INTRAMUSCULAR; INTRAVENOUS at 10:15

## 2018-06-21 RX ADMIN — CLINDAMYCIN PHOSPHATE 900 MG: 18 INJECTION, SOLUTION INTRAVENOUS at 11:16

## 2018-06-21 RX ADMIN — PROPOFOL 400 MG: 10 INJECTION, EMULSION INTRAVENOUS at 11:05

## 2018-06-21 ASSESSMENT — PULMONARY FUNCTION TESTS
PIF_VALUE: 19
PIF_VALUE: 18
PIF_VALUE: 17
PIF_VALUE: 1
PIF_VALUE: 19
PIF_VALUE: 19
PIF_VALUE: 20
PIF_VALUE: 19
PIF_VALUE: 2
PIF_VALUE: 19
PIF_VALUE: 19
PIF_VALUE: 18
PIF_VALUE: 23
PIF_VALUE: 19
PIF_VALUE: 19
PIF_VALUE: 17
PIF_VALUE: 18
PIF_VALUE: 18
PIF_VALUE: 19
PIF_VALUE: 17
PIF_VALUE: 19
PIF_VALUE: 17
PIF_VALUE: 19
PIF_VALUE: 19
PIF_VALUE: 18
PIF_VALUE: 19
PIF_VALUE: 19
PIF_VALUE: 17
PIF_VALUE: 15
PIF_VALUE: 19
PIF_VALUE: 17
PIF_VALUE: 20
PIF_VALUE: 35
PIF_VALUE: 19
PIF_VALUE: 19
PIF_VALUE: 17
PIF_VALUE: 20
PIF_VALUE: 19
PIF_VALUE: 17
PIF_VALUE: 2
PIF_VALUE: 20
PIF_VALUE: 1
PIF_VALUE: 28
PIF_VALUE: 19
PIF_VALUE: 19
PIF_VALUE: 17
PIF_VALUE: 2
PIF_VALUE: 10
PIF_VALUE: 4
PIF_VALUE: 33
PIF_VALUE: 20
PIF_VALUE: 19
PIF_VALUE: 17
PIF_VALUE: 19
PIF_VALUE: 17
PIF_VALUE: 19
PIF_VALUE: 21
PIF_VALUE: 1
PIF_VALUE: 1
PIF_VALUE: 18
PIF_VALUE: 19
PIF_VALUE: 5
PIF_VALUE: 5
PIF_VALUE: 19
PIF_VALUE: 19
PIF_VALUE: 4
PIF_VALUE: 47
PIF_VALUE: 19
PIF_VALUE: 19
PIF_VALUE: 31
PIF_VALUE: 19
PIF_VALUE: 17
PIF_VALUE: 19
PIF_VALUE: 19
PIF_VALUE: 20
PIF_VALUE: 1
PIF_VALUE: 19
PIF_VALUE: 21
PIF_VALUE: 4
PIF_VALUE: 1
PIF_VALUE: 19
PIF_VALUE: 53
PIF_VALUE: 19
PIF_VALUE: 20
PIF_VALUE: 20
PIF_VALUE: 19
PIF_VALUE: 38

## 2018-06-21 ASSESSMENT — PAIN SCALES - GENERAL
PAINLEVEL_OUTOF10: 5
PAINLEVEL_OUTOF10: 5
PAINLEVEL_OUTOF10: 7
PAINLEVEL_OUTOF10: 5
PAINLEVEL_OUTOF10: 6
PAINLEVEL_OUTOF10: 5
PAINLEVEL_OUTOF10: 5
PAINLEVEL_OUTOF10: 0
PAINLEVEL_OUTOF10: 5
PAINLEVEL_OUTOF10: 0
PAINLEVEL_OUTOF10: 0
PAINLEVEL_OUTOF10: 4
PAINLEVEL_OUTOF10: 5
PAINLEVEL_OUTOF10: 0

## 2018-06-21 ASSESSMENT — PAIN DESCRIPTION - ORIENTATION
ORIENTATION: LEFT
ORIENTATION: LEFT

## 2018-06-21 ASSESSMENT — PAIN DESCRIPTION - LOCATION
LOCATION: SHOULDER
LOCATION: SHOULDER

## 2018-06-21 ASSESSMENT — PAIN DESCRIPTION - DESCRIPTORS
DESCRIPTORS: ACHING;CONSTANT
DESCRIPTORS: TENDER;ACHING;CONSTANT

## 2018-06-21 ASSESSMENT — PAIN DESCRIPTION - PAIN TYPE
TYPE: ACUTE PAIN
TYPE: SURGICAL PAIN

## 2018-06-21 ASSESSMENT — PAIN DESCRIPTION - PROGRESSION
CLINICAL_PROGRESSION: GRADUALLY IMPROVING
CLINICAL_PROGRESSION: GRADUALLY WORSENING

## 2018-06-21 ASSESSMENT — PAIN - FUNCTIONAL ASSESSMENT: PAIN_FUNCTIONAL_ASSESSMENT: 0-10

## 2018-06-22 ENCOUNTER — TELEPHONE (OUTPATIENT)
Dept: ORTHOPEDIC SURGERY | Age: 23
End: 2018-06-22

## 2018-06-29 ENCOUNTER — OFFICE VISIT (OUTPATIENT)
Dept: ORTHOPEDIC SURGERY | Age: 23
End: 2018-06-29

## 2018-06-29 VITALS
BODY MASS INDEX: 41.18 KG/M2 | HEART RATE: 80 BPM | WEIGHT: 287 LBS | SYSTOLIC BLOOD PRESSURE: 126 MMHG | OXYGEN SATURATION: 98 % | DIASTOLIC BLOOD PRESSURE: 86 MMHG

## 2018-06-29 DIAGNOSIS — M25.312 SHOULDER INSTABILITY, LEFT: Primary | ICD-10-CM

## 2018-06-29 PROCEDURE — 99024 POSTOP FOLLOW-UP VISIT: CPT | Performed by: ORTHOPAEDIC SURGERY

## 2018-07-12 ENCOUNTER — OFFICE VISIT (OUTPATIENT)
Dept: FAMILY MEDICINE CLINIC | Age: 23
End: 2018-07-12
Payer: COMMERCIAL

## 2018-07-12 VITALS
HEART RATE: 68 BPM | WEIGHT: 267 LBS | SYSTOLIC BLOOD PRESSURE: 120 MMHG | DIASTOLIC BLOOD PRESSURE: 84 MMHG | BODY MASS INDEX: 38.31 KG/M2

## 2018-07-12 DIAGNOSIS — S43.302S SUBLUXATION OF LEFT SHOULDER GIRDLE, SEQUELA: ICD-10-CM

## 2018-07-12 DIAGNOSIS — E03.9 ACQUIRED HYPOTHYROIDISM: ICD-10-CM

## 2018-07-12 DIAGNOSIS — E78.5 HYPERLIPIDEMIA, UNSPECIFIED HYPERLIPIDEMIA TYPE: ICD-10-CM

## 2018-07-12 DIAGNOSIS — E11.8 TYPE 2 DIABETES MELLITUS WITH COMPLICATION, WITH LONG-TERM CURRENT USE OF INSULIN (HCC): Primary | ICD-10-CM

## 2018-07-12 DIAGNOSIS — Z79.4 TYPE 2 DIABETES MELLITUS WITH COMPLICATION, WITH LONG-TERM CURRENT USE OF INSULIN (HCC): Primary | ICD-10-CM

## 2018-07-12 DIAGNOSIS — G47.33 OBSTRUCTIVE SLEEP APNEA: ICD-10-CM

## 2018-07-12 LAB — HBA1C MFR BLD: 7.7 %

## 2018-07-12 PROCEDURE — 83036 HEMOGLOBIN GLYCOSYLATED A1C: CPT | Performed by: FAMILY MEDICINE

## 2018-07-12 PROCEDURE — 99214 OFFICE O/P EST MOD 30 MIN: CPT | Performed by: FAMILY MEDICINE

## 2018-07-12 RX ORDER — CYCLOBENZAPRINE HCL 10 MG
TABLET ORAL
Qty: 30 TABLET | Refills: 0 | Status: SHIPPED | OUTPATIENT
Start: 2018-07-12 | End: 2018-10-15 | Stop reason: ALTCHOICE

## 2018-07-12 NOTE — PROGRESS NOTES
appetite change, chills, diaphoresis and fever. HENT: Negative. Respiratory: Negative. Negative for choking. Cardiovascular: Negative for chest pain, palpitations and leg swelling. Gastrointestinal: Negative for abdominal pain, blood in stool, diarrhea and nausea. Endocrine: Negative for cold intolerance, heat intolerance, polydipsia, polyphagia and polyuria. Objective:     /84   Pulse 68   Wt 267 lb (121.1 kg)   BMI 38.31 kg/m²     Physical Exam   Constitutional: He appears well-developed and well-nourished. No distress. obese   HENT:   Mouth/Throat: No oropharyngeal exudate. Constricted oropharynx   Neck: Neck supple. Carotid bruit is not present. Cardiovascular: Regular rhythm, S1 normal and S2 normal.    No murmur heard. Pulmonary/Chest: Breath sounds normal. He has no wheezes. He has no rhonchi. He has no rales. Abdominal: Soft. Bowel sounds are normal. There is no hepatosplenomegaly. Musculoskeletal:        Left shoulder: He exhibits normal range of motion, no tenderness, no bony tenderness, no swelling, no effusion, no crepitus, no pain and no spasm. Left shoulder and arm in a sling   Lymphadenopathy:     He has no axillary adenopathy. Vitals reviewed. Assessment:      Diagnosis Orders   1. Type 2 diabetes mellitus with complication, with long-term current use of insulin ([facility])  POCT glycosylated hemoglobin (Hb A1C)   2. Obstructive sleep apnea     3. Acquired hypothyroidism     4. Hyperlipidemia, unspecified hyperlipidemia type     5. Subluxation of left shoulder girdle, sequela  cyclobenzaprine (FLEXERIL) 10 MG tablet            POC Testing Results (If Applicable):  Results for POC orders placed in visit on 07/12/18   POCT glycosylated hemoglobin (Hb A1C)   Result Value Ref Range    Hemoglobin A1C 7.7 %       Plan:     Patient will get his labs ordered 3 months ago. He was reprinted. He may try some Flexeril at bedtime help relax and hopefully get some sleep. 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 It is unlikely he will ever be off of insulin. He is encouraged to try to get his sleep study as he is at high risk for sleep apnea and this can affect his health long-term. Recheck with me in 3 months sooner if any problems    Orders Given:  Orders Placed This Encounter   Procedures    POCT glycosylated hemoglobin (Hb A1C)     Prescriptions:    Orders Placed This Encounter   Medications    cyclobenzaprine (FLEXERIL) 10 MG tablet     Sig: Take one at bedtiime as needed for pain /sleep     Dispense:  30 tablet     Refill:  0        Return in about 3 months (around 10/12/2018). Electronically signed by Analy Strong MD on 7/15/2018. **This report has been created using voice recognition software. It may contain minor errors which are inherent in voice recognition technology. **

## 2018-07-15 ASSESSMENT — ENCOUNTER SYMPTOMS
BLOOD IN STOOL: 0
NAUSEA: 0
CHOKING: 0
DIARRHEA: 0
RESPIRATORY NEGATIVE: 1
ABDOMINAL PAIN: 0

## 2018-07-27 ENCOUNTER — OFFICE VISIT (OUTPATIENT)
Dept: ORTHOPEDIC SURGERY | Age: 23
End: 2018-07-27

## 2018-07-27 VITALS
HEART RATE: 98 BPM | RESPIRATION RATE: 12 BRPM | DIASTOLIC BLOOD PRESSURE: 84 MMHG | BODY MASS INDEX: 42.47 KG/M2 | SYSTOLIC BLOOD PRESSURE: 120 MMHG | OXYGEN SATURATION: 98 % | WEIGHT: 296 LBS

## 2018-07-27 DIAGNOSIS — M25.312 INSTABILITY OF LEFT SHOULDER JOINT: Primary | ICD-10-CM

## 2018-07-27 PROCEDURE — 99024 POSTOP FOLLOW-UP VISIT: CPT | Performed by: ORTHOPAEDIC SURGERY

## 2018-07-27 NOTE — PROGRESS NOTES
SURGERY Left 2006    left arm reconstruction - fracture    IA OFFICE/OUTPT VISIT,PROCEDURE ONLY Left 6/21/2018    SHOULDER ARTHROSCOPY, OPEN CAPSULAR SHIFT REPAIR performed by Nadia Mota MD at [facility] ARTHROSCOPY Left 06/21/2018    with open capsular shift    UPPER GASTROINTESTINAL ENDOSCOPY  2014    [facility] for hematemesis; found hiatal hernia    UPPER GASTROINTESTINAL ENDOSCOPY  05/03/2016    moderate gastritis     Family History   Problem Relation Age of Onset    High Blood Pressure Mother     Heart Attack Mother     High Blood Pressure Father     Heart Attack Maternal Grandmother     Heart Attack Maternal Grandfather     Diabetes Maternal Grandfather      Social History   Substance Use Topics    Smoking status: Never Smoker    Smokeless tobacco: Former User      Comment: MIKE Domingo RRT 5/3/2016    Alcohol use 0.0 oz/week      Comment: occasional       Objective:     Vitals:    07/27/18 1025   BP: 120/84   Pulse: 98   Resp: 12   SpO2: 98%   Weight: 296 lb (134.3 kg)     Physical Exam  On exam the incision is well-healed. His forward elevation is to 160°. He has a negative Neer negative Tovar. Good rotator cuff strength. He has a negative apprehension. He does lack a little bit of external rotation. Assessment:     Visit Diagnoses       Codes    Instability of left shoulder joint    -  Primary ICD-10-CM: G76.655  ICD-9-CM: 718.81           Plan: We will continue with his physical therapy and get rid of restrictions as far as motion. We'll work on strengthening. I'll see him back in 4 weeks.      Electronically signed by Nadia Mota MD on 7/27/2018 at 10:46 AM

## 2018-08-28 DIAGNOSIS — E11.65 TYPE 2 DIABETES MELLITUS WITH HYPERGLYCEMIA, WITHOUT LONG-TERM CURRENT USE OF INSULIN (HCC): ICD-10-CM

## 2018-08-30 RX ORDER — LISINOPRIL 2.5 MG/1
TABLET ORAL
Qty: 90 TABLET | Refills: 1 | Status: SHIPPED | OUTPATIENT
Start: 2018-08-30 | End: 2019-04-22 | Stop reason: DRUGHIGH

## 2018-08-30 NOTE — TELEPHONE ENCOUNTER
Marin Soria is calling to request a refill on the following medication(s):  Requested Prescriptions     Pending Prescriptions Disp Refills    lisinopril (PRINIVIL;ZESTRIL) 2.5 MG tablet [Pharmacy Med Name: LISINOPRIL 2.5MG    TAB] 90 tablet 1     Sig: TAKE 1 TABLET BY MOUTH ONCE DAILY       Last Visit Date (If Applicable):  3/81/6677    Next Visit Date:    10/15/2018

## 2018-09-26 DIAGNOSIS — Z79.4 TYPE 2 DIABETES MELLITUS WITH HYPERGLYCEMIA, WITH LONG-TERM CURRENT USE OF INSULIN (HCC): ICD-10-CM

## 2018-09-26 DIAGNOSIS — E11.65 TYPE 2 DIABETES MELLITUS WITH HYPERGLYCEMIA, WITH LONG-TERM CURRENT USE OF INSULIN (HCC): ICD-10-CM

## 2018-09-26 NOTE — TELEPHONE ENCOUNTER
Rajendra Fregoso is calling to request a refill on the following medication(s):  Requested Prescriptions     Pending Prescriptions Disp Refills    insulin lispro (HUMALOG KWIKPEN) 100 UNIT/ML pen 5 pen 3     Sig: subcutaneously before meals ; per sliding scale - 5 units for every 50 above 150       Last Visit Date (If Applicable):  2/23/9196    Next Visit Date:    10/15/2018

## 2018-09-28 ENCOUNTER — TELEPHONE (OUTPATIENT)
Dept: FAMILY MEDICINE CLINIC | Age: 23
End: 2018-09-28

## 2018-09-28 NOTE — TELEPHONE ENCOUNTER
Carol Rosa - right aid     Requesting a maximum daily amount of insulin in order to figure out a daily supply amount.

## 2018-10-15 ENCOUNTER — OFFICE VISIT (OUTPATIENT)
Dept: FAMILY MEDICINE CLINIC | Age: 23
End: 2018-10-15
Payer: COMMERCIAL

## 2018-10-15 VITALS
SYSTOLIC BLOOD PRESSURE: 126 MMHG | DIASTOLIC BLOOD PRESSURE: 84 MMHG | BODY MASS INDEX: 44.19 KG/M2 | HEART RATE: 80 BPM | WEIGHT: 308 LBS

## 2018-10-15 DIAGNOSIS — K92.0 GASTROINTESTINAL HEMORRHAGE WITH HEMATEMESIS: ICD-10-CM

## 2018-10-15 DIAGNOSIS — Z23 NEED FOR INFLUENZA VACCINATION: ICD-10-CM

## 2018-10-15 DIAGNOSIS — Z79.4 TYPE 2 DIABETES MELLITUS WITH COMPLICATION, WITH LONG-TERM CURRENT USE OF INSULIN (HCC): Primary | ICD-10-CM

## 2018-10-15 DIAGNOSIS — E78.2 MIXED HYPERLIPIDEMIA: ICD-10-CM

## 2018-10-15 DIAGNOSIS — G47.33 OBSTRUCTIVE SLEEP APNEA: ICD-10-CM

## 2018-10-15 DIAGNOSIS — E03.9 ACQUIRED HYPOTHYROIDISM: ICD-10-CM

## 2018-10-15 DIAGNOSIS — E11.8 TYPE 2 DIABETES MELLITUS WITH COMPLICATION, WITH LONG-TERM CURRENT USE OF INSULIN (HCC): Primary | ICD-10-CM

## 2018-10-15 DIAGNOSIS — E78.5 HYPERLIPIDEMIA, UNSPECIFIED HYPERLIPIDEMIA TYPE: ICD-10-CM

## 2018-10-15 LAB — HBA1C MFR BLD: 8.2 %

## 2018-10-15 PROCEDURE — 83036 HEMOGLOBIN GLYCOSYLATED A1C: CPT | Performed by: FAMILY MEDICINE

## 2018-10-15 PROCEDURE — 90686 IIV4 VACC NO PRSV 0.5 ML IM: CPT | Performed by: FAMILY MEDICINE

## 2018-10-15 PROCEDURE — 99214 OFFICE O/P EST MOD 30 MIN: CPT | Performed by: FAMILY MEDICINE

## 2018-10-15 PROCEDURE — 90471 IMMUNIZATION ADMIN: CPT | Performed by: FAMILY MEDICINE

## 2018-10-17 NOTE — PROGRESS NOTES
numbness or tingling. I think it is just due to sweating in footwear. He is encouraged to pay more attention to his diet, watch his sugars more closely. He may recheck with me in 3 months. Continue current medications     Orders Given:  Orders Placed This Encounter   Procedures    INFLUENZA, QUADV, 3 YRS AND OLDER, IM, PF, PREFILL SYR OR SDV, 0.5ML (FLUZONE QUADV, PF)    POCT glycosylated hemoglobin (Hb A1C)       Prescriptions:    No orders of the defined types were placed in this encounter. Return in about 3 months (around 1/15/2019).       Dilcia Chairez am scribing for Sheng Pryor MD 10/18/2018 at 6:36 AM.

## 2018-10-18 ASSESSMENT — ENCOUNTER SYMPTOMS
CHOKING: 0
DIARRHEA: 0
RESPIRATORY NEGATIVE: 1
BLOOD IN STOOL: 0
NAUSEA: 0
BACK PAIN: 0
ABDOMINAL PAIN: 0

## 2018-10-22 LAB
A/G RATIO: 1.3 RATIO
AGE FOR GFR: 24
ALBUMIN: 4.4 G/DL
ALK PHOSPHATASE: 109 UNITS/L
ALT SERPL-CCNC: 87 UNITS/L
ANION GAP SERPL CALCULATED.3IONS-SCNC: 15 MMOL/L
AST SERPL-CCNC: 31 UNITS/L
BILIRUB SERPL-MCNC: 0.4 MG/DL
BUN BLDV-MCNC: 14 MG/DL
CALCIUM SERPL-MCNC: 9.5 MG/DL
CHLORIDE BLD-SCNC: 102 MMOL/L
CHOLESTEROL/HDL RATIO: 5.7 RATIO
CHOLESTEROL: 193 MG/DL
CO2: 26 MMOL/L
CREAT SERPL-MCNC: 0.7 MG/DL
CREATININE, RANDOM: 132.9 MG/DL
EGFR BF: 124 ML/MIN/1.73 M2
EGFR BM: 168 ML/MIN/1.73 M2
EGFR WF: 103 ML/MIN/1.73 M2
EGFR WM: 139 ML/MIN/1.73 M2
GLOBULIN: 3.3 G/DL
GLUCOSE: 185 MG/DL
HDL, DIRECT: 34 MG/DL
LDL CHOLESTEROL CALCULATED: 111.6 MG/DL
MICROALBUMIN UR-MCNC: 1.6 MG/DL
MICROALBUMIN/CREAT UR-RTO: 12 MCG/MG CR
POTASSIUM SERPL-SCNC: 4.8 MMOL/L
SODIUM BLD-SCNC: 138 MMOL/L
TOTAL PROTEIN: 7.7 G/DL
TRIGL SERPL-MCNC: 237 MG/DL
VLDLC SERPL CALC-MCNC: 47 MG/DL

## 2018-11-20 DIAGNOSIS — E11.65 TYPE 2 DIABETES MELLITUS WITH HYPERGLYCEMIA, WITH LONG-TERM CURRENT USE OF INSULIN (HCC): ICD-10-CM

## 2018-11-20 DIAGNOSIS — Z79.4 TYPE 2 DIABETES MELLITUS WITH HYPERGLYCEMIA, WITH LONG-TERM CURRENT USE OF INSULIN (HCC): ICD-10-CM

## 2018-11-21 RX ORDER — DAPAGLIFLOZIN 10 MG/1
TABLET, FILM COATED ORAL
Qty: 30 TABLET | Refills: 3 | Status: SHIPPED | OUTPATIENT
Start: 2018-11-21 | End: 2019-03-06 | Stop reason: SDUPTHER

## 2019-01-15 ENCOUNTER — OFFICE VISIT (OUTPATIENT)
Dept: FAMILY MEDICINE CLINIC | Age: 24
End: 2019-01-15
Payer: COMMERCIAL

## 2019-01-15 VITALS
SYSTOLIC BLOOD PRESSURE: 124 MMHG | OXYGEN SATURATION: 97 % | WEIGHT: 306.56 LBS | BODY MASS INDEX: 43.99 KG/M2 | DIASTOLIC BLOOD PRESSURE: 82 MMHG | HEART RATE: 88 BPM

## 2019-01-15 DIAGNOSIS — E11.65 TYPE 2 DIABETES MELLITUS WITH HYPERGLYCEMIA, WITH LONG-TERM CURRENT USE OF INSULIN (HCC): Primary | ICD-10-CM

## 2019-01-15 DIAGNOSIS — E78.2 MIXED HYPERLIPIDEMIA: ICD-10-CM

## 2019-01-15 DIAGNOSIS — E03.9 ACQUIRED HYPOTHYROIDISM: ICD-10-CM

## 2019-01-15 DIAGNOSIS — G47.33 OBSTRUCTIVE SLEEP APNEA: ICD-10-CM

## 2019-01-15 DIAGNOSIS — Z79.4 TYPE 2 DIABETES MELLITUS WITH HYPERGLYCEMIA, WITH LONG-TERM CURRENT USE OF INSULIN (HCC): Primary | ICD-10-CM

## 2019-01-15 LAB — HBA1C MFR BLD: 8.5 %

## 2019-01-15 PROCEDURE — 83036 HEMOGLOBIN GLYCOSYLATED A1C: CPT | Performed by: FAMILY MEDICINE

## 2019-01-15 PROCEDURE — 99214 OFFICE O/P EST MOD 30 MIN: CPT | Performed by: FAMILY MEDICINE

## 2019-01-15 RX ORDER — ATORVASTATIN CALCIUM 40 MG/1
40 TABLET, FILM COATED ORAL DAILY
Qty: 30 TABLET | Refills: 5 | Status: SHIPPED | OUTPATIENT
Start: 2019-01-15 | End: 2019-11-05 | Stop reason: DRUGHIGH

## 2019-01-20 ASSESSMENT — ENCOUNTER SYMPTOMS
SHORTNESS OF BREATH: 0
NAUSEA: 0
DIARRHEA: 0
WHEEZING: 0
BACK PAIN: 0
ABDOMINAL PAIN: 0
COUGH: 0
BLOOD IN STOOL: 0

## 2019-01-23 ENCOUNTER — TELEPHONE (OUTPATIENT)
Dept: FAMILY MEDICINE CLINIC | Age: 24
End: 2019-01-23

## 2019-01-23 DIAGNOSIS — Z79.4 TYPE 2 DIABETES MELLITUS WITH HYPERGLYCEMIA, WITH LONG-TERM CURRENT USE OF INSULIN (HCC): Primary | ICD-10-CM

## 2019-01-23 DIAGNOSIS — E11.65 TYPE 2 DIABETES MELLITUS WITH HYPERGLYCEMIA, WITH LONG-TERM CURRENT USE OF INSULIN (HCC): Primary | ICD-10-CM

## 2019-03-06 DIAGNOSIS — E11.65 TYPE 2 DIABETES MELLITUS WITH HYPERGLYCEMIA, WITH LONG-TERM CURRENT USE OF INSULIN (HCC): ICD-10-CM

## 2019-03-06 DIAGNOSIS — Z79.4 TYPE 2 DIABETES MELLITUS WITH HYPERGLYCEMIA, WITH LONG-TERM CURRENT USE OF INSULIN (HCC): ICD-10-CM

## 2019-04-05 DIAGNOSIS — E11.65 TYPE 2 DIABETES MELLITUS WITH HYPERGLYCEMIA, WITH LONG-TERM CURRENT USE OF INSULIN (HCC): Primary | ICD-10-CM

## 2019-04-05 DIAGNOSIS — Z79.4 TYPE 2 DIABETES MELLITUS WITH HYPERGLYCEMIA, WITH LONG-TERM CURRENT USE OF INSULIN (HCC): Primary | ICD-10-CM

## 2019-04-22 ENCOUNTER — OFFICE VISIT (OUTPATIENT)
Dept: FAMILY MEDICINE CLINIC | Age: 24
End: 2019-04-22
Payer: COMMERCIAL

## 2019-04-22 VITALS
BODY MASS INDEX: 43.67 KG/M2 | SYSTOLIC BLOOD PRESSURE: 144 MMHG | RESPIRATION RATE: 20 BRPM | HEART RATE: 96 BPM | HEIGHT: 70 IN | WEIGHT: 305 LBS | DIASTOLIC BLOOD PRESSURE: 84 MMHG

## 2019-04-22 DIAGNOSIS — E11.65 TYPE 2 DIABETES MELLITUS WITH HYPERGLYCEMIA, WITH LONG-TERM CURRENT USE OF INSULIN (HCC): Primary | ICD-10-CM

## 2019-04-22 DIAGNOSIS — E03.9 ACQUIRED HYPOTHYROIDISM: ICD-10-CM

## 2019-04-22 DIAGNOSIS — G47.33 OBSTRUCTIVE SLEEP APNEA: ICD-10-CM

## 2019-04-22 DIAGNOSIS — E66.01 MORBID OBESITY DUE TO EXCESS CALORIES (HCC): ICD-10-CM

## 2019-04-22 DIAGNOSIS — I10 ESSENTIAL HYPERTENSION: ICD-10-CM

## 2019-04-22 DIAGNOSIS — E78.2 MIXED HYPERLIPIDEMIA: ICD-10-CM

## 2019-04-22 DIAGNOSIS — Z79.4 TYPE 2 DIABETES MELLITUS WITH HYPERGLYCEMIA, WITH LONG-TERM CURRENT USE OF INSULIN (HCC): Primary | ICD-10-CM

## 2019-04-22 LAB
HBA1C MFR BLD: 10.8 %
T4 FREE: 0.83 NG/DL (ref 0.78–2.1)
TSH REFLEX FT4: 4.93 MIU/ML (ref 0.49–4.6)

## 2019-04-22 PROCEDURE — 99214 OFFICE O/P EST MOD 30 MIN: CPT | Performed by: FAMILY MEDICINE

## 2019-04-22 PROCEDURE — 83036 HEMOGLOBIN GLYCOSYLATED A1C: CPT | Performed by: FAMILY MEDICINE

## 2019-04-22 RX ORDER — LISINOPRIL 10 MG/1
10 TABLET ORAL DAILY
Qty: 90 TABLET | Refills: 3 | Status: SHIPPED | OUTPATIENT
Start: 2019-04-22 | End: 2019-06-21 | Stop reason: SDUPTHER

## 2019-04-22 RX ORDER — PANTOPRAZOLE SODIUM 40 MG/1
40 TABLET, DELAYED RELEASE ORAL DAILY
Qty: 90 TABLET | Refills: 3 | Status: SHIPPED | OUTPATIENT
Start: 2019-04-22 | End: 2019-06-21 | Stop reason: SDUPTHER

## 2019-04-22 ASSESSMENT — ENCOUNTER SYMPTOMS
ABDOMINAL PAIN: 0
COUGH: 0
BACK PAIN: 0
NAUSEA: 0
BLOOD IN STOOL: 0
SHORTNESS OF BREATH: 0
DIARRHEA: 0
WHEEZING: 0

## 2019-04-22 ASSESSMENT — PATIENT HEALTH QUESTIONNAIRE - PHQ9
2. FEELING DOWN, DEPRESSED OR HOPELESS: 0
SUM OF ALL RESPONSES TO PHQ QUESTIONS 1-9: 0
1. LITTLE INTEREST OR PLEASURE IN DOING THINGS: 0
SUM OF ALL RESPONSES TO PHQ QUESTIONS 1-9: 0
SUM OF ALL RESPONSES TO PHQ9 QUESTIONS 1 & 2: 0

## 2019-04-22 NOTE — PROGRESS NOTES
[address] [facility]  [address]  Dept: [phone]  DeptFax: [phone]    Oneil Alvares is a20 y.o. male who presents today for his medical conditions/complaints as noted below. Oneil Alvares is c/o of Diabetes (3 month follow up. Checks sugars at least daily. cannot remember to take meds) and Other (wife states he is spencer and moods have been all over the place.)      HPI:     HPI   Patient is here for routine 3 month checkup    Diabetes  He checks his sugars basically twice daily. Before breakfast and before lunch  He remains on Toujeo 76 units before bed. He covers his meals with sliding scale with 5 units for every 50 above 150. But only before lunch time (which he calls dinner)  no hypoglycemia   fasting running 240-250; occasional nearly 400 ; before lunch it is lower; depends on how much he eats . He has no dysuria. He has not changed anything else activity wise. He is also on jardiance 25mg instead of farxiga and metformin 1000 mg twice daily. He remains on atorvastatin. He is on a baby aspirin. He says that he did get his eye exam a month or so ago ; supposed to be normal.  Patient does state that he is been missing his medications on occasion. Apparently having trouble getting up in the morning ; he is  not covering the evening meal ;      Hyperlipidemia  He is on atorvastatin 40 mg. His last lipid profile was in October 2018  . Total cholesterol 193, HDL 34,  . Tolerates without side effects. Denies chest pain or chest pressure. No increase in arthralgias       Obstructive sleep apnea   He is not on a CPAP. he did not go back to see pulmonary however;  he denies excessive somnolence or fatigue. No lower extremity edema. He does not awaken with choking in the night.     GERD/h/o upper GI bleed   he had a bout of hematemesis in January . Seen in the ER.   Labs were apparently normal.  He was given some medication to last about 14 days. And he stopped. he is not taking medication at the present time ; he had EGDs in 2014 and 2016. Which are shown a hiatal hernia and some moderate gastritis. He is asymptomatic at present time. He is not bothered by heartburn and dysphagia. No change in bowel habits. No melanotic stool. Patient also complains or his wife complains of changes in his mood. More irritable. Denies depressive symptoms. BP Readings from Last 3 Encounters:   04/22/19 (!) 144/84   01/15/19 124/82   10/15/18 126/84            (goal 120/80)    Past Medical History:   Diagnosis Date    Diabetes mellitus ([address]     GERD (gastroesophageal reflux disease)     Hyperlipidemia     Hypothyroidism       Past Surgical History:   Procedure Laterality Date    APPENDECTOMY  2013    ARM SURGERY Left 2006    left arm reconstruction - fracture    MA OFFICE/OUTPT VISIT,PROCEDURE ONLY Left 6/21/2018    SHOULDER ARTHROSCOPY, OPEN CAPSULAR SHIFT REPAIR performed by Jose Alex MD at [facility] ARTHROSCOPY Left 06/21/2018    with open capsular shift    UPPER GASTROINTESTINAL ENDOSCOPY  2014    [facility] for hematemesis; found hiatal hernia    UPPER GASTROINTESTINAL ENDOSCOPY  05/03/2016    moderate gastritis     Family History   Problem Relation Age of Onset    High Blood Pressure Mother     Heart Attack Mother     High Blood Pressure Father     Heart Attack Maternal Grandmother     Heart Attack Maternal Grandfather     Diabetes Maternal Grandfather      Social History     Tobacco Use    Smoking status: Never Smoker    Smokeless tobacco: Former User    Tobacco comment: MIKE Domingo RRT 5/3/2016   Substance Use Topics    Alcohol use:  Yes     Alcohol/week: 0.0 oz     Comment: occasional        Current Outpatient Medications   Medication Sig Dispense Refill    pantoprazole (PROTONIX) 40 MG tablet Take 1 tablet by mouth daily 90 tablet 3    lisinopril (PRINIVIL;ZESTRIL) 10 MG tablet Take 1 tablet by mouth daily 90 tablet 3    metFORMIN (GLUCOPHAGE-XR) 500 MG extended release tablet take 1 tablet by mouth every morning WITH BREAKFAST 150 tablet 0    insulin glargine (TOUJEO SOLOSTAR) 300 UNIT/ML injection pen Inject 70 Units into the skin nightly Increase toujeo to 76 units nightly (Patient taking differently: Inject 76 Units into the skin nightly Increase toujeo to 76 units nightly) 15 pen 3    empagliflozin (JARDIANCE) 25 MG tablet Take 25 mg by mouth daily 90 tablet 3    atorvastatin (LIPITOR) 40 MG tablet Take 1 tablet by mouth daily 30 tablet 5    insulin lispro (HUMALOG KWIKPEN) 100 UNIT/ML pen subcutaneously before meals ; per sliding scale - 5 units for every 50 above 150 5 pen 3    Insulin Pen Needle 32G X 4 MM MISC 1 each by Does not apply route daily 100 each 3    glucose monitoring kit (FREESTYLE) monitoring kit 1 kit by Does not apply route daily 1 kit 0    glucose blood VI test strips (ASCENSIA AUTODISC VI;ONE TOUCH ULTRA TEST VI) strip Check blood 3 times daily before meals 100 each 3    aspirin 81 MG tablet Take 81 mg by mouth daily Indications: delayed release tablet      acetaminophen 650 MG TABS Take 650 mg by mouth every 4 hours as needed 120 tablet 0     No current facility-administered medications for this visit.       Allergies   Allergen Reactions    Cephalexin Hives    Sulfa Antibiotics Hives       Health Maintenance   Topic Date Due    Pneumococcal 0-64 years Vaccine (1 of 1 - PPSV23) 10/07/2000    Diabetic retinal exam  10/07/2004    Varicella Vaccine (1 of 2 - 13+ 2-dose series) 10/07/2007    Hepatitis B Vaccine (1 of 3 - Risk 3-dose series) 10/07/2013    DTaP/Tdap/Td vaccine (1 - Tdap) 10/07/2013    TSH testing  08/25/2018    A1C test (Diabetic or Prediabetic)  04/15/2019    HIV screen  12/14/2027 (Originally 10/7/2009)    Diabetic microalbuminuria test  10/22/2019    Lipid screen  10/22/2019    Potassium monitoring  10/22/2019    Creatinine monitoring 10/22/2019    Diabetic foot exam  01/15/2020    Flu vaccine  Completed    HPV vaccine  Aged Out       Lab Results   Component Value Date    K 4.8 10/22/2018    CREATININE 0.7 10/22/2018    AST 31 10/22/2018    ALT 87 10/22/2018    HCT 48.4 08/21/2017    LABA1C 10.8 04/22/2019    GLUCOSE 185 10/22/2018    CALCIUM 9.5 10/22/2018      Lab Results   Component Value Date    CHOL 193 10/22/2018    TRIG 237 10/22/2018       Subjective:      Review of Systems   Constitutional: Negative for appetite change, chills, diaphoresis, fatigue and fever. HENT: Negative. Respiratory: Negative for cough, shortness of breath and wheezing. Cardiovascular: Negative for chest pain, palpitations and leg swelling. Gastrointestinal: Positive for vomiting (one time in January ; with hematemsis ). Negative for abdominal pain, blood in stool, diarrhea and nausea. Endocrine: Negative for cold intolerance, heat intolerance, polydipsia, polyphagia and polyuria. Musculoskeletal: Negative for arthralgias and back pain. Skin: Negative for rash and wound. Objective:     BP (!) 144/84   Pulse 96   Resp 20   Ht 5' 10\" (1.778 m)   Wt (!) 305 lb (138.3 kg)   BMI 43.76 kg/m²     Physical Exam   Constitutional: He appears well-developed and well-nourished. No distress. obese   HENT:   Right Ear: Tympanic membrane normal.   Left Ear: Tympanic membrane normal.   Mouth/Throat: Oropharynx is clear and moist and mucous membranes are normal.   Constricted oropharynx   Neck: Neck supple. Carotid bruit is not present. Cardiovascular: Normal rate, regular rhythm, S1 normal and S2 normal.   No murmur heard. Pulmonary/Chest: Breath sounds normal. He has no wheezes. He has no rhonchi. He has no rales. Abdominal: Soft. Bowel sounds are normal. There is no hepatosplenomegaly. Musculoskeletal: He exhibits no edema. Vitals reviewed. Assessment:      Diagnosis Orders   1.  Type 2 diabetes mellitus with hyperglycemia, with long-term current use of insulin (HCC)  POCT glycosylated hemoglobin (Hb A1C)    Ambulatory referral to Diabetic Education    CBC Auto Differential    Microalbumin, Ur    Basic Metabolic Panel, Fasting   2. Obstructive sleep apnea     3. Acquired hypothyroidism     4. Mixed hyperlipidemia  Lipid Panel   5. Morbid obesity due to excess calories ([address]     6. Essential hypertension  lisinopril (PRINIVIL;ZESTRIL) 10 MG tablet            POC Testing Results (If Applicable):  Results for POC orders placed in visit on 04/22/19   POCT glycosylated hemoglobin (Hb A1C)   Result Value Ref Range    Hemoglobin A1C 10.8 %       Plan:   Patient will need to cover both lunch and dinner or as he calls it dinner and supper. Continue current dose of toujeo and jardiance ; referral to diabetic education. Comprehensive education. Take medication safely.   Labs in 3 months recheck in 3 months sooner if any problems      Orders Given:  Orders Placed This Encounter   Procedures    CBC Auto Differential     Standing Status:   Future     Standing Expiration Date:   4/21/2020    Microalbumin, Ur     Standing Status:   Future     Standing Expiration Date:   4/21/2020    Lipid Panel     Standing Status:   Future     Standing Expiration Date:   4/21/2020     Order Specific Question:   Is Patient Fasting?/# of Hours     Answer:   Yes, 12 hours    Basic Metabolic Panel, Fasting     Standing Status:   Future     Standing Expiration Date:   4/21/2020    TSH WITH REFLEX TO FT4    T4, Free    Ambulatory referral to Diabetic Education     Referral Priority:   Routine     Referral Type:   Consult for Advice and Opinion     Number of Visits Requested:   1    POCT glycosylated hemoglobin (Hb A1C)     Prescriptions:    Orders Placed This Encounter   Medications    pantoprazole (PROTONIX) 40 MG tablet     Sig: Take 1 tablet by mouth daily     Dispense:  90 tablet     Refill:  3    lisinopril (PRINIVIL;ZESTRIL) 10 MG tablet     Sig: Take 1 tablet by mouth daily     Dispense:  90 tablet     Refill:  3        Return in about 3 months (around 7/22/2019). Electronically signed by Jaciel Chacko MD on4/23/2019. **This report has been created using voice recognition software. It may contain minor errors which are inherent in voice recognition technology. **

## 2019-04-23 ASSESSMENT — ENCOUNTER SYMPTOMS: VOMITING: 1

## 2019-05-24 ENCOUNTER — OFFICE VISIT (OUTPATIENT)
Dept: FAMILY MEDICINE CLINIC | Age: 24
End: 2019-05-24
Payer: COMMERCIAL

## 2019-05-24 VITALS
HEART RATE: 82 BPM | BODY MASS INDEX: 43.91 KG/M2 | SYSTOLIC BLOOD PRESSURE: 126 MMHG | WEIGHT: 306 LBS | DIASTOLIC BLOOD PRESSURE: 86 MMHG | OXYGEN SATURATION: 97 %

## 2019-05-24 DIAGNOSIS — M79.671 RIGHT FOOT PAIN: ICD-10-CM

## 2019-05-24 DIAGNOSIS — Z79.4 TYPE 2 DIABETES MELLITUS WITH HYPERGLYCEMIA, WITH LONG-TERM CURRENT USE OF INSULIN (HCC): ICD-10-CM

## 2019-05-24 DIAGNOSIS — E11.65 TYPE 2 DIABETES MELLITUS WITH HYPERGLYCEMIA, WITH LONG-TERM CURRENT USE OF INSULIN (HCC): ICD-10-CM

## 2019-05-24 DIAGNOSIS — S61.210D LACERATION OF RIGHT INDEX FINGER WITHOUT FOREIGN BODY WITHOUT DAMAGE TO NAIL, SUBSEQUENT ENCOUNTER: Primary | ICD-10-CM

## 2019-05-24 PROCEDURE — 99213 OFFICE O/P EST LOW 20 MIN: CPT | Performed by: FAMILY MEDICINE

## 2019-05-24 ASSESSMENT — ENCOUNTER SYMPTOMS: RESPIRATORY NEGATIVE: 1

## 2019-05-24 NOTE — PROGRESS NOTES
[address] [place] [address]  Dept: [phone]  DeptFax: [phone]    Ranjit Iniguez is a20 y.o. male who presents today for his medical conditions/complaints as noted below. Ranjit Iniguez is c/o of Follow-Up from Hospital (pt was seen in [facility] ER for finger laceration and has sutures that need removed. recommended by er doc that maybe only half be removed and then following week other half depending on laceration healing. had 5 sutures placed on right index finger. pt reports he cut finger on a maggie fender) and Foot Injury (pt reports having a sore on his right foot says has had all week states on ball of foot)      HPI:     HPI    Patient is here for suture removal from his right index finger. Initially evaluated on the 10th. In the ER. Returned and was sutured on the 13th. Here for removal.  Was suggested that we just take half the stitches out. Is also complaining of some right foot pain. Thinks he has a sore in his right foot. It hurts to walk. Feels like it's bruised.       BP Readings from Last 3 Encounters:   05/24/19 126/86   04/22/19 (!) 144/84   01/15/19 124/82            (goal 120/80)    Past Medical History:   Diagnosis Date    Diabetes mellitus ([address]     GERD (gastroesophageal reflux disease)     Hyperlipidemia     Hypothyroidism       Past Surgical History:   Procedure Laterality Date    APPENDECTOMY  2013    ARM SURGERY Left 2006    left arm reconstruction - fracture    IN OFFICE/OUTPT VISIT,PROCEDURE ONLY Left 6/21/2018    SHOULDER ARTHROSCOPY, OPEN CAPSULAR SHIFT REPAIR performed by Shameka Orozco MD at [address] ARTHROSCOPY Left 06/21/2018    with open capsular shift    UPPER GASTROINTESTINAL ENDOSCOPY  2014    [facility] hematem[facility]; found hiatal hernia    UPPER GASTROINTESTINAL ENDOSCOPY  05/03/2016    moderate gastritis     Family History   Problem Relation Age of Onset    High Blood Pressure Mother     Heart Attack Mother     High Blood Pressure Father     Heart Attack Maternal Grandmother     Heart Attack Maternal Grandfather     Diabetes Maternal Grandfather      Social History     Tobacco Use    Smoking status: Never Smoker    Smokeless tobacco: Former User    Tobacco comment: MIKE Domingo RRT 5/3/2016   Substance Use Topics    Alcohol use: Yes     Alcohol/week: 0.0 oz     Comment: occasional        Current Outpatient Medications   Medication Sig Dispense Refill    dapagliflozin (FARXIGA) 10 MG tablet Take 10 mg by mouth      insulin lispro (HUMALOG KWIKPEN) 100 UNIT/ML pen subcutaneously before meals ; per sliding scale - 5 units for every 50 above 150 5 pen 3    insulin glargine (TOUJEO SOLOSTAR) 300 UNIT/ML injection pen Inject 75 Units into the skin nightly Increase toujeo to 76 units nightly 15 pen 3    pantoprazole (PROTONIX) 40 MG tablet Take 1 tablet by mouth daily 90 tablet 3    lisinopril (PRINIVIL;ZESTRIL) 10 MG tablet Take 1 tablet by mouth daily 90 tablet 3    metFORMIN (GLUCOPHAGE-XR) 500 MG extended release tablet take 1 tablet by mouth every morning WITH BREAKFAST 150 tablet 0    empagliflozin (JARDIANCE) 25 MG tablet Take 25 mg by mouth daily 90 tablet 3    atorvastatin (LIPITOR) 40 MG tablet Take 1 tablet by mouth daily 30 tablet 5    glucose blood VI test strips (ASCENSIA AUTODISC VI;ONE TOUCH ULTRA TEST VI) strip Check blood 3 times daily before meals 100 each 3    Insulin Pen Needle 32G X 4 MM MISC 1 each by Does not apply route daily 100 each 3    glucose monitoring kit (FREESTYLE) monitoring kit 1 kit by Does not apply route daily 1 kit 0    aspirin 81 MG tablet Take 81 mg by mouth daily Indications: delayed release tablet      acetaminophen 650 MG TABS Take 650 mg by mouth every 4 hours as needed 120 tablet 0     No current facility-administered medications for this visit.       Allergies   Allergen Reactions    Cephalexin Hives    Sulfa Antibiotics Hives       Health Maintenance   Topic Date Due    Pneumococcal 0-64 years Vaccine (1 of 1 - PPSV23) 10/07/2000    Diabetic retinal exam  10/07/2004    Varicella Vaccine (1 of 2 - 13+ 2-dose series) 10/07/2007    Hepatitis B Vaccine (1 of 3 - Risk 3-dose series) 10/07/2013    DTaP/Tdap/Td vaccine (1 - Tdap) 10/07/2013    HIV screen  12/14/2027 (Originally 10/7/2009)    A1C test (Diabetic or Prediabetic)  07/22/2019    Diabetic microalbuminuria test  10/22/2019    Lipid screen  10/22/2019    Potassium monitoring  10/22/2019    Creatinine monitoring  10/22/2019    Diabetic foot exam  01/15/2020    TSH testing  04/22/2020    Flu vaccine  Completed    HPV vaccine  Aged [facility] Results   Component Value Date    K 4.8 10/22/2018    CREATININE 0.7 10/22/2018    AST 31 10/22/2018    ALT 87 10/22/2018    HCT 48.4 08/21/2017    LABA1C 10.8 04/22/2019    GLUCOSE 185 10/22/2018    CALCIUM 9.5 10/22/2018      Lab Results   Component Value Date    CHOL 193 10/22/2018    TRIG 237 10/22/2018       Subjective:      Review of Systems   Constitutional: Negative. Respiratory: Negative. Cardiovascular: Negative. Musculoskeletal: Positive for arthralgias. Skin: Positive for wound. Objective:     /86   Pulse 82   Wt (!) 306 lb (138.8 kg)   SpO2 97%   BMI 43.91 kg/m²     Physical Exam   Constitutional: He appears well-nourished. Cardiovascular: Normal rate, regular rhythm and normal heart sounds. Pulses:       Dorsalis pedis pulses are 2+ on the right side. Posterior tibial pulses are 2+ on the right side. Pulmonary/Chest: Effort normal and breath sounds normal.   Musculoskeletal:        Hands:       Right foot: There is normal range of motion and no deformity. Feet:   Right Foot:   Skin Integrity: Negative for ulcer, blister, skin breakdown, erythema, warmth, callus or dry skin. All the sutures are removed without difficulty.   The wound remained well approximated. Assessment:      Diagnosis Orders   1. Laceration of right index finger without foreign body without damage to nail, subsequent encounter     2. Type 2 diabetes mellitus with hyperglycemia, with long-term current use of insulin (HCC)  insulin lispro (HUMALOG KWIKPEN) 100 UNIT/ML pen    insulin glargine (TOUJEO SOLOSTAR) 300 UNIT/ML injection pen   3. Right foot pain              POC Testing Results (If Applicable):  No results found for this visit on 05/24/19. Plan:     His right foot exam is normal.  There is no skin breakdown. No callus no blister. I suggested they might add some inserts for shoes or change boots. There is reassured that there is no lesion there. Likewise the laceration running well approximated and should be no problem. Recheck as scheduled in July    Orders Given:  No orders of the defined types were placed in this encounter. Prescriptions:    Orders Placed This Encounter   Medications    insulin lispro (HUMALOG KWIKPEN) 100 UNIT/ML pen     Sig: subcutaneously before meals ; per sliding scale - 5 units for every 50 above 150     Dispense:  5 pen     Refill:  3    insulin glargine (TOUJEO SOLOSTAR) 300 UNIT/ML injection pen     Sig: Inject 75 Units into the skin nightly Increase toujeo to 76 units nightly     Dispense:  15 pen     Refill:  3     Please consider 90 day supplies to promote better adherence        Return in about 8 weeks (around 7/22/2019). Electronically signed by Lizzy Carey MD on5/24/2019. **This report has been created using voice recognition software. It may contain minor errors which are inherent in voice recognition technology. **

## 2019-06-21 DIAGNOSIS — I10 ESSENTIAL HYPERTENSION: ICD-10-CM

## 2019-06-21 DIAGNOSIS — E11.65 TYPE 2 DIABETES MELLITUS WITH HYPERGLYCEMIA, WITH LONG-TERM CURRENT USE OF INSULIN (HCC): ICD-10-CM

## 2019-06-21 DIAGNOSIS — Z79.4 TYPE 2 DIABETES MELLITUS WITH HYPERGLYCEMIA, WITH LONG-TERM CURRENT USE OF INSULIN (HCC): ICD-10-CM

## 2019-06-21 RX ORDER — METFORMIN HYDROCHLORIDE 500 MG/1
TABLET, EXTENDED RELEASE ORAL
Qty: 90 TABLET | Refills: 1 | Status: SHIPPED | OUTPATIENT
Start: 2019-06-21 | End: 2020-06-15 | Stop reason: SDUPTHER

## 2019-06-21 RX ORDER — PANTOPRAZOLE SODIUM 40 MG/1
40 TABLET, DELAYED RELEASE ORAL DAILY
Qty: 90 TABLET | Refills: 3 | Status: SHIPPED | OUTPATIENT
Start: 2019-06-21 | End: 2020-06-15 | Stop reason: SDUPTHER

## 2019-06-21 RX ORDER — LISINOPRIL 10 MG/1
10 TABLET ORAL DAILY
Qty: 90 TABLET | Refills: 3 | Status: SHIPPED | OUTPATIENT
Start: 2019-06-21 | End: 2019-11-05 | Stop reason: DRUGHIGH

## 2019-07-25 ENCOUNTER — OFFICE VISIT (OUTPATIENT)
Dept: FAMILY MEDICINE CLINIC | Age: 24
End: 2019-07-25
Payer: COMMERCIAL

## 2019-07-25 VITALS
HEART RATE: 88 BPM | DIASTOLIC BLOOD PRESSURE: 80 MMHG | OXYGEN SATURATION: 98 % | SYSTOLIC BLOOD PRESSURE: 114 MMHG | BODY MASS INDEX: 44.62 KG/M2 | WEIGHT: 311 LBS

## 2019-07-25 DIAGNOSIS — E11.65 TYPE 2 DIABETES MELLITUS WITH HYPERGLYCEMIA, WITH LONG-TERM CURRENT USE OF INSULIN (HCC): ICD-10-CM

## 2019-07-25 DIAGNOSIS — Z79.4 TYPE 2 DIABETES MELLITUS WITH HYPERGLYCEMIA, WITH LONG-TERM CURRENT USE OF INSULIN (HCC): ICD-10-CM

## 2019-07-25 DIAGNOSIS — E78.2 MIXED HYPERLIPIDEMIA: ICD-10-CM

## 2019-07-25 DIAGNOSIS — E03.9 ACQUIRED HYPOTHYROIDISM: ICD-10-CM

## 2019-07-25 DIAGNOSIS — G47.33 OBSTRUCTIVE SLEEP APNEA: Primary | ICD-10-CM

## 2019-07-25 DIAGNOSIS — K21.9 GASTROESOPHAGEAL REFLUX DISEASE, ESOPHAGITIS PRESENCE NOT SPECIFIED: ICD-10-CM

## 2019-07-25 DIAGNOSIS — K92.0 GASTROINTESTINAL HEMORRHAGE WITH HEMATEMESIS: ICD-10-CM

## 2019-07-25 PROBLEM — E78.5 HYPERLIPIDEMIA: Status: ACTIVE | Noted: 2019-07-25

## 2019-07-25 LAB
CHOLESTEROL/HDL RATIO: 8.4 RATIO (ref 0–4.5)
CHOLESTEROL: 226 MG/DL (ref 50–200)
CREATININE, RANDOM: 64.5 MG/DL (ref 20–370)
HBA1C MFR BLD: 9 %
HDL, DIRECT: 27 MG/DL (ref 36–68)
LDL CHOLESTEROL CALCULATED: 77.8 MG/DL (ref 0–160)
MICROALBUMIN UR-MCNC: <0.6 MG/DL (ref 0–1.7)
TRIGL SERPL-MCNC: 606 MG/DL (ref 10–250)
VLDLC SERPL CALC-MCNC: 121 MG/DL (ref 0–40)

## 2019-07-25 PROCEDURE — 83036 HEMOGLOBIN GLYCOSYLATED A1C: CPT | Performed by: FAMILY MEDICINE

## 2019-07-25 PROCEDURE — 99214 OFFICE O/P EST MOD 30 MIN: CPT | Performed by: FAMILY MEDICINE

## 2019-07-25 ASSESSMENT — ENCOUNTER SYMPTOMS
SHORTNESS OF BREATH: 0
BACK PAIN: 0
ABDOMINAL PAIN: 0
NAUSEA: 0
DIARRHEA: 0
WHEEZING: 0
COUGH: 0
BLOOD IN STOOL: 0

## 2019-07-26 ENCOUNTER — TELEPHONE (OUTPATIENT)
Dept: FAMILY MEDICINE CLINIC | Age: 24
End: 2019-07-26

## 2019-07-26 DIAGNOSIS — Z79.4 TYPE 2 DIABETES MELLITUS WITH HYPERGLYCEMIA, WITH LONG-TERM CURRENT USE OF INSULIN (HCC): Primary | ICD-10-CM

## 2019-07-26 DIAGNOSIS — E11.65 TYPE 2 DIABETES MELLITUS WITH HYPERGLYCEMIA, WITH LONG-TERM CURRENT USE OF INSULIN (HCC): Primary | ICD-10-CM

## 2019-07-30 LAB
BASOPHILS # BLD: 0.08 THOU/MM3 (ref 0–0.3)
CHOLESTEROL/HDL RATIO: 5.8 RATIO (ref 0–4.5)
CHOLESTEROL: 181 MG/DL (ref 50–200)
DIFFERENTIAL: AUTOMATED DIFF
EOSINOPHIL # BLD: 0.11 THOU/MM3 (ref 0–1.1)
HCT VFR BLD CALC: 50.9 % (ref 42–52)
HDL, DIRECT: 31 MG/DL (ref 36–68)
HEMOGLOBIN: 17 G/DL (ref 13.8–17)
LDL CHOLESTEROL CALCULATED: 120.6 MG/DL (ref 0–160)
LYMPHOCYTES # BLD: 2.71 THOU/MM3 (ref 1–5.5)
MCH RBC QN AUTO: 28.6 PG (ref 28.5–32)
MCHC RBC AUTO-ENTMCNC: 33.3 G/DL (ref 32–37)
MCV RBC AUTO: 85.9 FL (ref 80–94)
MONOCYTES # BLD: 0.49 THOU/MM3 (ref 0.1–1)
NEUTROPHILS: 4.2 THOU/MM3 (ref 2–8.1)
PDW BLD-RTO: 11.2 % (ref 10–15)
PLATELET # BLD: 276 THOU/MM3 (ref 130–400)
PMV BLD AUTO: 6.2 FL (ref 7.4–11)
RBC # BLD: 5.93 M/UL (ref 4.7–6.1)
TRIGL SERPL-MCNC: 147 MG/DL (ref 10–250)
VLDLC SERPL CALC-MCNC: 29 MG/DL (ref 0–40)
WBC # BLD: 7.58 THOU/ML3 (ref 4.8–10)

## 2019-09-30 DIAGNOSIS — E11.65 TYPE 2 DIABETES MELLITUS WITH HYPERGLYCEMIA, WITH LONG-TERM CURRENT USE OF INSULIN (HCC): ICD-10-CM

## 2019-09-30 DIAGNOSIS — Z79.4 TYPE 2 DIABETES MELLITUS WITH HYPERGLYCEMIA, WITH LONG-TERM CURRENT USE OF INSULIN (HCC): ICD-10-CM

## 2019-10-29 PROBLEM — I10 ESSENTIAL HYPERTENSION: Status: ACTIVE | Noted: 2019-10-29

## 2019-11-05 ENCOUNTER — OFFICE VISIT (OUTPATIENT)
Dept: FAMILY MEDICINE CLINIC | Age: 24
End: 2019-11-05
Payer: COMMERCIAL

## 2019-11-05 VITALS
WEIGHT: 307 LBS | SYSTOLIC BLOOD PRESSURE: 144 MMHG | HEART RATE: 100 BPM | BODY MASS INDEX: 44.05 KG/M2 | DIASTOLIC BLOOD PRESSURE: 104 MMHG | OXYGEN SATURATION: 96 %

## 2019-11-05 DIAGNOSIS — E78.2 MIXED HYPERLIPIDEMIA: ICD-10-CM

## 2019-11-05 DIAGNOSIS — E11.9 TYPE 2 DIABETES MELLITUS WITHOUT COMPLICATION, WITH LONG-TERM CURRENT USE OF INSULIN (HCC): ICD-10-CM

## 2019-11-05 DIAGNOSIS — Z79.4 TYPE 2 DIABETES MELLITUS WITH HYPERGLYCEMIA, WITH LONG-TERM CURRENT USE OF INSULIN (HCC): Primary | ICD-10-CM

## 2019-11-05 DIAGNOSIS — Z79.4 TYPE 2 DIABETES MELLITUS WITHOUT COMPLICATION, WITH LONG-TERM CURRENT USE OF INSULIN (HCC): ICD-10-CM

## 2019-11-05 DIAGNOSIS — K92.0 GASTROINTESTINAL HEMORRHAGE WITH HEMATEMESIS: ICD-10-CM

## 2019-11-05 DIAGNOSIS — G47.33 OBSTRUCTIVE SLEEP APNEA: ICD-10-CM

## 2019-11-05 DIAGNOSIS — E11.65 TYPE 2 DIABETES MELLITUS WITH HYPERGLYCEMIA, WITH LONG-TERM CURRENT USE OF INSULIN (HCC): Primary | ICD-10-CM

## 2019-11-05 DIAGNOSIS — I10 ESSENTIAL HYPERTENSION: ICD-10-CM

## 2019-11-05 DIAGNOSIS — E03.9 HYPOTHYROIDISM, UNSPECIFIED TYPE: ICD-10-CM

## 2019-11-05 DIAGNOSIS — Z23 NEED FOR PROPHYLACTIC VACCINATION AND INOCULATION AGAINST INFLUENZA: ICD-10-CM

## 2019-11-05 DIAGNOSIS — E66.01 MORBID OBESITY DUE TO EXCESS CALORIES (HCC): ICD-10-CM

## 2019-11-05 LAB — HBA1C MFR BLD: 10 %

## 2019-11-05 PROCEDURE — 83036 HEMOGLOBIN GLYCOSYLATED A1C: CPT | Performed by: FAMILY MEDICINE

## 2019-11-05 PROCEDURE — 99214 OFFICE O/P EST MOD 30 MIN: CPT | Performed by: FAMILY MEDICINE

## 2019-11-05 RX ORDER — LISINOPRIL AND HYDROCHLOROTHIAZIDE 25; 20 MG/1; MG/1
1 TABLET ORAL DAILY
Qty: 30 TABLET | Refills: 3 | Status: SHIPPED | OUTPATIENT
Start: 2019-11-05 | End: 2020-06-15 | Stop reason: SDUPTHER

## 2019-11-05 RX ORDER — ATORVASTATIN CALCIUM 80 MG/1
80 TABLET, FILM COATED ORAL DAILY
Qty: 30 TABLET | Refills: 3 | Status: SHIPPED | OUTPATIENT
Start: 2019-11-05 | End: 2020-06-15 | Stop reason: DRUGHIGH

## 2019-11-05 ASSESSMENT — ENCOUNTER SYMPTOMS
NAUSEA: 0
ABDOMINAL PAIN: 0
BLOOD IN STOOL: 0
WHEEZING: 0
DIARRHEA: 0
COUGH: 0
BACK PAIN: 0
SHORTNESS OF BREATH: 0

## 2019-11-07 PROCEDURE — 90471 IMMUNIZATION ADMIN: CPT | Performed by: FAMILY MEDICINE

## 2019-11-07 PROCEDURE — 90686 IIV4 VACC NO PRSV 0.5 ML IM: CPT | Performed by: FAMILY MEDICINE

## 2020-01-03 RX ORDER — INSULIN LISPRO 100 [IU]/ML
INJECTION, SOLUTION INTRAVENOUS; SUBCUTANEOUS
Qty: 15 ML | Refills: 5 | Status: SHIPPED | OUTPATIENT
Start: 2020-01-03 | End: 2020-09-03

## 2020-01-27 ENCOUNTER — TELEPHONE (OUTPATIENT)
Dept: FAMILY MEDICINE CLINIC | Age: 25
End: 2020-01-27

## 2020-03-23 ENCOUNTER — TELEPHONE (OUTPATIENT)
Dept: FAMILY MEDICINE CLINIC | Age: 25
End: 2020-03-23

## 2020-03-25 NOTE — TELEPHONE ENCOUNTER
We do not have enough insulin to cover him for any significant period of time; he will have to look into patient assistance, his wife may be getting insurance for him; Good Rx doesn't help enough.  We may need to contact drug rep's to see what they might do for him; talked with him yesterday

## 2020-04-10 RX ORDER — INSULIN LISPRO 100 [IU]/ML
INJECTION, SOLUTION INTRAVENOUS; SUBCUTANEOUS
Qty: 15 PEN | Refills: 3 | Status: SHIPPED | OUTPATIENT
Start: 2020-04-10 | End: 2020-06-15 | Stop reason: SDUPTHER

## 2020-04-29 RX ORDER — INSULIN DEGLUDEC 200 U/ML
70 INJECTION, SOLUTION SUBCUTANEOUS NIGHTLY
Qty: 15 PEN | Refills: 0 | Status: SHIPPED | OUTPATIENT
Start: 2020-04-29 | End: 2020-11-17 | Stop reason: SDUPTHER

## 2020-06-15 ENCOUNTER — OFFICE VISIT (OUTPATIENT)
Dept: FAMILY MEDICINE CLINIC | Age: 25
End: 2020-06-15
Payer: MEDICAID

## 2020-06-15 VITALS
DIASTOLIC BLOOD PRESSURE: 82 MMHG | HEART RATE: 109 BPM | SYSTOLIC BLOOD PRESSURE: 124 MMHG | HEIGHT: 71 IN | WEIGHT: 299 LBS | BODY MASS INDEX: 41.86 KG/M2 | OXYGEN SATURATION: 97 %

## 2020-06-15 LAB — HBA1C MFR BLD: 10.8 %

## 2020-06-15 PROCEDURE — 99214 OFFICE O/P EST MOD 30 MIN: CPT | Performed by: NURSE PRACTITIONER

## 2020-06-15 PROCEDURE — 83036 HEMOGLOBIN GLYCOSYLATED A1C: CPT | Performed by: NURSE PRACTITIONER

## 2020-06-15 RX ORDER — METFORMIN HYDROCHLORIDE 500 MG/1
TABLET, EXTENDED RELEASE ORAL
Qty: 120 TABLET | Refills: 5 | Status: SHIPPED | OUTPATIENT
Start: 2020-06-15 | End: 2021-01-25

## 2020-06-15 RX ORDER — INSULIN LISPRO 100 [IU]/ML
INJECTION, SOLUTION INTRAVENOUS; SUBCUTANEOUS
Qty: 4 PEN | Refills: 5 | Status: SHIPPED | OUTPATIENT
Start: 2020-06-15 | End: 2020-07-13

## 2020-06-15 RX ORDER — LISINOPRIL AND HYDROCHLOROTHIAZIDE 25; 20 MG/1; MG/1
1 TABLET ORAL DAILY
Qty: 30 TABLET | Refills: 5 | Status: SHIPPED | OUTPATIENT
Start: 2020-06-15 | End: 2021-01-25

## 2020-06-15 RX ORDER — ATORVASTATIN CALCIUM 80 MG/1
80 TABLET, FILM COATED ORAL NIGHTLY
Qty: 30 TABLET | Refills: 5 | Status: SHIPPED | OUTPATIENT
Start: 2020-06-15 | End: 2021-01-25

## 2020-06-15 RX ORDER — PANTOPRAZOLE SODIUM 40 MG/1
40 TABLET, DELAYED RELEASE ORAL DAILY
Qty: 30 TABLET | Refills: 5 | Status: SHIPPED | OUTPATIENT
Start: 2020-06-15 | End: 2021-02-22

## 2020-06-15 RX ORDER — DOXYCYCLINE HYCLATE 100 MG
100 TABLET ORAL 2 TIMES DAILY
Qty: 20 TABLET | Refills: 0 | Status: SHIPPED | OUTPATIENT
Start: 2020-06-15 | End: 2020-07-13 | Stop reason: SDUPTHER

## 2020-06-15 RX ORDER — ATORVASTATIN CALCIUM 80 MG/1
80 TABLET, FILM COATED ORAL NIGHTLY
Qty: 30 TABLET | Refills: 3 | Status: SHIPPED
Start: 2020-06-15 | End: 2020-06-15 | Stop reason: SDUPTHER

## 2020-06-15 ASSESSMENT — PATIENT HEALTH QUESTIONNAIRE - PHQ9
1. LITTLE INTEREST OR PLEASURE IN DOING THINGS: 0
2. FEELING DOWN, DEPRESSED OR HOPELESS: 0
SUM OF ALL RESPONSES TO PHQ9 QUESTIONS 1 & 2: 0
SUM OF ALL RESPONSES TO PHQ QUESTIONS 1-9: 0
SUM OF ALL RESPONSES TO PHQ QUESTIONS 1-9: 0

## 2020-06-15 NOTE — PROGRESS NOTES
[address] [facility]  [address]  Dept: [phone]  Dept Fax: [phone]    Chief Complaint   Patient presents with   Ralph Mcnamara Doctor     previous pcp knipe    Rash     rash on legs x1 year-when working wears long pants and steel toed boots    Health Maintenance     seeing dr Kelli Johnston in [place] for eye exam this coming month       HPI:   Patient presents to the office to establish care. He is previously followed with Dr. Dann Do who recently retired. He was diagnosed with diabetes 3 years ago/ He he is on insulin regimen. He admits to frequently forgetting his mealtime insulin. Dana Huang is a 21 y.o. male who presents for follow-up of hypertension, diabetes, and hyperlipidemia. He indicates that he is feeling well and denies any symptoms referable to his elevated blood pressure or diabetes. Specifically denies chest pain, palpitations, dyspnea, peripheral edema, thirst, frequent urination, and blurred vision. Current medication regimen is as listed below. He denies any side effects of medication, and has been compliant, taking it regularly. Home glucose readings have been 250-300. Checks blood sugars maybe once weekly. Last eye exam: Appointment is coming up. Diabetic complications include: none    Hyperlipidemia:  No new myalgias or GI upset on atorvastatin (Lipitor). Gastroesophageal Reflux   He reports no abdominal pain, no chest pain, no coughing, no dysphagia, no globus sensation, no heartburn, no hoarse voice, no nausea, no sore throat or no wheezing. This is a chronic problem. The current episode started more than 1 year ago. The problem occurs occasionally. Nothing aggravates the symptoms. Pertinent negatives include no fatigue. Risk factors include obesity and lack of exercise. He has tried a PPI for the symptoms. The treatment provided significant relief. Past procedures include an EGD.        Medication Thyroid: No thyromegaly. Vascular: No carotid bruit or JVD. Cardiovascular:      Rate and Rhythm: Normal rate and regular rhythm. Heart sounds: Normal heart sounds. Pulmonary:      Effort: Pulmonary effort is normal. No respiratory distress. Breath sounds: Normal breath sounds. No wheezing. Abdominal:      General: Bowel sounds are normal.      Palpations: Abdomen is soft. Tenderness: There is no abdominal tenderness. Musculoskeletal:      Right lower leg: No edema. Left lower leg: No edema. Lymphadenopathy:      Cervical: No cervical adenopathy. Skin:     Capillary Refill: Capillary refill takes less than 2 seconds. Findings: Rash (scattered hair follicle, BLE) present. Rash is papular and pustular. Neurological:      Mental Status: He is alert and oriented to person, place, and time. Psychiatric:         Mood and Affect: Mood normal.         Behavior: Behavior is cooperative.        Diabetic Foot Exam  Deformities: No    Pulses: normal  Edema: normal  Skin lesions: normal  Callous: No  Nails: normal    Sensory exam  Monofilament Sensation LeftFoot: 10/10   Monofilament Sensation Right Foot: 10/10     PHQ Scores 6/15/2020 4/22/2019 4/5/2018   PHQ2 Score 0 0 0   PHQ9 Score 0 0 0     Interpretation of Total Score Depression Severity: 1-4 = Minimal depression, 5-9 = Mild depression, 10-14 = Moderate depression, 15-19 = Moderately severe depression, 20-27 = Severe depression    Lab Results   Component Value Date    LABA1C 10.8 06/15/2020    LABA1C 10.0 11/05/2019    LABA1C 9.0 07/25/2019     Lab Results   Component Value Date    LABMICR <0.6 07/25/2019    LDLCALC 120.6 07/30/2019    CREATININE 0.7 10/22/2018       Lab Results   Component Value Date     10/22/2018    K 4.8 10/22/2018     10/22/2018    CO2 26 10/22/2018    BUN 14 10/22/2018    CREATININE 0.7 10/22/2018    GLUCOSE 185 (H) 10/22/2018    CALCIUM 9.5 10/22/2018    PROT 7.7 10/22/2018    LABALBU 4.4 10/22/2018    BILITOT 0.4 10/22/2018    ALKPHOS 109 10/22/2018    AST 31 10/22/2018    ALT 87 (H) 10/22/2018    LABGLOM >60 05/04/2016    GFRAA >60 05/04/2016    AGRATIO 1.3 10/22/2018    GLOB 3.3 10/22/2018       Lab Results   Component Value Date    LABA1C 10.8 06/15/2020    LABA1C 10.0 11/05/2019    LABA1C 9.0 07/25/2019     Lab Results   Component Value Date    LABMICR <0.6 07/25/2019    CREATININE 0.7 10/22/2018     Lab Results   Component Value Date     10/22/2018    K 4.8 10/22/2018     10/22/2018    CO2 26 10/22/2018    BUN 14 10/22/2018    CREATININE 0.7 10/22/2018    GLUCOSE 185 (H) 10/22/2018    CALCIUM 9.5 10/22/2018    CALCIUM 9.7 08/21/2017    CALCIUM 9.2 05/04/2016     Lab Results   Component Value Date    CHOL 181 07/30/2019    TRIG 147 07/30/2019    LDLCALC 120.6 07/30/2019       Assessment:     Jennifer Bishop was seen today for established new doctor, rash and health maintenance. Diagnoses and all orders for this visit:    Encounter to establish care    Type 2 diabetes mellitus with hyperglycemia, with long-term current use of insulin ([facility] [address]  -     POCT glycosylated hemoglobin (Hb A1C)  -     Microalbumin, Ur; Future  -     Lipid, Fasting; Future  -     Comprehensive Metabolic Panel, Fasting; Future  -     insulin lispro, 1 Unit Dial, (HUMALOG KWIKPEN) 100 UNIT/ML SOPN; Inject before meals per sliding scale 5 units for every 50 above 150 max of 100 units a day  -     metFORMIN (GLUCOPHAGE-XR) 500 MG extended release tablet; Take 2 tablets by mouth 2 times daily with meals. -     empagliflozin (JARDIANCE) 25 MG tablet; Take 25 mg by mouth daily  -     dapagliflozin (FARXIGA) 10 MG tablet; Take 1 tablet by mouth every morning  -      DIABETES FOOT EXAM    Essential hypertension  -     Microalbumin, Ur; Future  -     Lipid, Fasting; Future  -     Comprehensive Metabolic Panel, Fasting; Future  -     lisinopril-hydroCHLOROthiazide (PRINZIDE;ZESTORETIC) 20-25 MG per tablet;  Take 1 tablet by mouth daily    Mixed hyperlipidemia  -     Lipid, Fasting; Future  -     Comprehensive Metabolic Panel, Fasting; Future  -     Discontinue: atorvastatin (LIPITOR) 80 MG tablet; Take 1 tablet by mouth nightly  -     atorvastatin (LIPITOR) 80 MG tablet; Take 1 tablet by mouth nightly    Hypothyroidism, unspecified type  -     TSH without Reflex; Future    Obstructive sleep apnea    Gastroesophageal reflux disease, esophagitis presence not specified  -     pantoprazole (PROTONIX) 40 MG tablet; Take 1 tablet by mouth daily    Morbid obesity due to excess calories (HCC)    Folliculitis  -     doxycycline hyclate (VIBRA-TABS) 100 MG tablet; Take 1 tablet by mouth 2 times daily for 10 days      Results for POC orders placed in visit on 06/15/20   POCT glycosylated hemoglobin (Hb A1C)   Result Value Ref Range    Hemoglobin A1C 10.8 %        Plan:     Orders Placed This Encounter   Procedures    Microalbumin, Ur     Standing Status:   Future     Standing Expiration Date:   6/14/2021    Lipid, Fasting     Standing Status:   Future     Standing Expiration Date:   6/14/2021    Comprehensive Metabolic Panel, Fasting     Standing Status:   Future     Standing Expiration Date:   6/14/2021    TSH without Reflex     Standing Status:   Future     Standing Expiration Date:   6/14/2021    POCT glycosylated hemoglobin (Hb A1C)     DIABETES FOOT EXAM     Orders Placed This Encounter   Medications    DISCONTD: atorvastatin (LIPITOR) 80 MG tablet     Sig: Take 1 tablet by mouth nightly     Dispense:  30 tablet     Refill:  3    insulin lispro, 1 Unit Dial, (HUMALOG KWIKPEN) 100 UNIT/ML SOPN     Sig: Inject before meals per sliding scale 5 units for every 50 above 150 max of 100 units a day     Dispense:  4 pen     Refill:  5    metFORMIN (GLUCOPHAGE-XR) 500 MG extended release tablet     Sig: Take 2 tablets by mouth 2 times daily with meals.      Dispense:  120 tablet     Refill:  5    pantoprazole (PROTONIX) 40 MG tablet

## 2020-06-21 ASSESSMENT — ENCOUNTER SYMPTOMS
EYES NEGATIVE: 1
COUGH: 0
SHORTNESS OF BREATH: 0
WHEEZING: 0
NAUSEA: 0
DIARRHEA: 0
ABDOMINAL PAIN: 0
CONSTIPATION: 0
HEARTBURN: 0
HOARSE VOICE: 0
GLOBUS SENSATION: 0
SORE THROAT: 0

## 2020-07-10 NOTE — TELEPHONE ENCOUNTER
Girish Means is calling to request a refill on the following medication(s):  Requested Prescriptions     Pending Prescriptions Disp Refills    blood glucose test strips (ASCENSIA AUTODISC VI;ONE TOUCH ULTRA TEST VI) strip 100 each 3     Sig: Check blood 3 times daily before meals       Last Visit Date (If Applicable):  3/91/3865    Next Visit Date:    7/13/2020

## 2020-07-13 ENCOUNTER — OFFICE VISIT (OUTPATIENT)
Dept: FAMILY MEDICINE CLINIC | Age: 25
End: 2020-07-13
Payer: MEDICAID

## 2020-07-13 VITALS
SYSTOLIC BLOOD PRESSURE: 112 MMHG | BODY MASS INDEX: 42.12 KG/M2 | WEIGHT: 302 LBS | DIASTOLIC BLOOD PRESSURE: 80 MMHG | HEART RATE: 101 BPM | OXYGEN SATURATION: 98 %

## 2020-07-13 PROCEDURE — 99213 OFFICE O/P EST LOW 20 MIN: CPT | Performed by: NURSE PRACTITIONER

## 2020-07-13 RX ORDER — LANCETS 30 GAUGE
EACH MISCELLANEOUS
Qty: 100 EACH | Refills: 3 | Status: SHIPPED | OUTPATIENT
Start: 2020-07-13 | End: 2020-10-13

## 2020-07-13 RX ORDER — DOXYCYCLINE HYCLATE 100 MG
100 TABLET ORAL 2 TIMES DAILY
Qty: 20 TABLET | Refills: 0 | Status: SHIPPED | OUTPATIENT
Start: 2020-07-13 | End: 2020-07-23

## 2020-07-13 RX ORDER — GLUCOSAMINE HCL/CHONDROITIN SU 500-400 MG
1 CAPSULE ORAL 4 TIMES DAILY
Qty: 90 STRIP | Refills: 3 | Status: SHIPPED | OUTPATIENT
Start: 2020-07-13 | End: 2020-12-29 | Stop reason: SDUPTHER

## 2020-07-13 ASSESSMENT — ENCOUNTER SYMPTOMS
ABDOMINAL PAIN: 0
WHEEZING: 0
DIARRHEA: 0
CONSTIPATION: 0
SHORTNESS OF BREATH: 0
EYES NEGATIVE: 1
COUGH: 0

## 2020-07-13 NOTE — PROGRESS NOTES
[address] [facility]  [address] Pending [facility] to [facility]  Dept: [phone]  Dept Fax: [phone]    Raj Lanes is a 21 y.o. male who presents today for his medical conditions/complaints as noted below. Raj Lanes c/o of 1 Month Follow-Up and Diabetes (ran out of test strips-since changing meds ran 230)    HPI:   Patient presents to the office for follow-up of diabetes. He has been taking 70 units of long-acting insulin at night. He has not been able to take his mealtime insulin. States he has been out of strips and unable to monitor sugars. Diabetes   He presents for his follow-up diabetic visit. He has type 2 diabetes mellitus. His disease course has been stable. There are no hypoglycemic associated symptoms. Pertinent negatives for hypoglycemia include no dizziness, headaches or nervousness/anxiousness. There are no diabetic associated symptoms. Pertinent negatives for diabetes include no chest pain, no fatigue, no polydipsia, no polyphagia and no polyuria. There are no hypoglycemic complications. Symptoms are stable. There are no diabetic complications. Risk factors for coronary artery disease include dyslipidemia, diabetes mellitus, hypertension, male sex, obesity, sedentary lifestyle and family history. Current diabetic treatment includes intensive insulin program. He is compliant with treatment none of the time. He is currently taking insulin pre-breakfast, pre-lunch, pre-dinner and at bedtime. When asked about meal planning, he reported none. He rarely participates in exercise. There is no compliance with monitoring of blood glucose. An ACE inhibitor/angiotensin II receptor blocker is being taken. Eye exam is not current.        BP Readings from Last 3 Encounters:   07/13/20 112/80   06/15/20 124/82   11/05/19 (!) 144/104              ([id number])    Wt Readings from Last 3 Encounters:   07/13/20 (!) 302 lb (137 kg)   06/15/20 299 lb (135.6 kg) each 3    metFORMIN (GLUCOPHAGE-XR) 500 MG extended release tablet Take 2 tablets by mouth 2 times daily with meals. 120 tablet 5    pantoprazole (PROTONIX) 40 MG tablet Take 1 tablet by mouth daily 30 tablet 5    atorvastatin (LIPITOR) 80 MG tablet Take 1 tablet by mouth nightly 30 tablet 5    lisinopril-hydroCHLOROthiazide (PRINZIDE;ZESTORETIC) 20-25 MG per tablet Take 1 tablet by mouth daily 30 tablet 5    empagliflozin (JARDIANCE) 25 MG tablet Take 25 mg by mouth daily 30 tablet 5    dapagliflozin (FARXIGA) 10 MG tablet Take 1 tablet by mouth every morning 30 tablet 5    Insulin Degludec (TRESIBA FLEXTOUCH) 200 UNIT/ML SOPN Inject 70 Units into the skin nightly 15 pen 0    Insulin Pen Needle 32G X 4 MM MISC 1 each by Does not apply route daily 100 each 0    HUMALOG KWIKPEN 100 UNIT/ML SOPN INJECT SUBCUTANEOUSLY BEFORE MEALS PER SLIDING SCALE - 5 UNITS FOR EVERY 50 ABOVE 150 MAX  UNITS 15 mL 5    glucose monitoring kit (FREESTYLE) monitoring kit 1 kit by Does not apply route daily 1 kit 0    acetaminophen 650 MG TABS Take 650 mg by mouth every 4 hours as needed 120 tablet 0     No current facility-administered medications for this visit.       Allergies   Allergen Reactions    Cephalexin Hives    Sulfa Antibiotics Hives       Health Maintenance   Topic Date Due    Varicella vaccine (1 of 2 - 2-dose childhood series) 10/07/1995    Diabetic retinal exam  10/07/2004    HPV vaccine (1 - Male 2-dose series) 10/07/2005    Hepatitis B vaccine (1 of 3 - Risk 3-dose series) 10/07/2013    HIV screen  12/14/2027 (Originally 10/7/2009)    Flu vaccine (1) 09/01/2020    A1C test (Diabetic or Prediabetic)  09/15/2020    Diabetic foot exam  06/21/2021    Diabetic microalbuminuria test  07/16/2021    Lipid screen  07/16/2021    TSH testing  07/16/2021    Potassium monitoring  07/16/2021    Creatinine monitoring  07/16/2021    DTaP/Tdap/Td vaccine (3 - Td) 05/10/2029    Pneumococcal 0-64 years Vaccine  Completed    Hepatitis A vaccine  Aged Out    Hib vaccine  Aged Out    Meningococcal (ACWY) vaccine  Aged Out       Subjective:      Review of Systems   Constitutional: Negative for chills, diaphoresis, fatigue and fever. HENT: Negative. Eyes: Negative. Respiratory: Negative for cough, shortness of breath and wheezing. Cardiovascular: Negative for chest pain, palpitations and leg swelling. Gastrointestinal: Negative for abdominal pain, constipation and diarrhea. Endocrine: Negative for cold intolerance, heat intolerance, polydipsia, polyphagia and polyuria. Genitourinary: Negative. Musculoskeletal: Negative for myalgias. Skin: Positive for rash (BLE). Neurological: Negative for dizziness and headaches. Psychiatric/Behavioral: Negative for agitation, decreased concentration and sleep disturbance. The patient is not nervous/anxious. Objective:     /80 (Site: Right Upper Arm, Position: Sitting)   Pulse 101   Wt (!) 302 lb (137 kg)   SpO2 98%   BMI 42.12 kg/m²     Physical Exam  Constitutional:       Appearance: Normal appearance. He is well-developed and well-groomed. HENT:      Head: Normocephalic. Right Ear: Tympanic membrane and ear canal normal.      Left Ear: Tympanic membrane and ear canal normal.      Nose: Nose normal.      Mouth/Throat:      Mouth: Mucous membranes are moist.   Eyes:      Conjunctiva/sclera: Conjunctivae normal.      Pupils: Pupils are equal, round, and reactive to light. Neck:      Musculoskeletal: Neck supple. Thyroid: No thyromegaly. Vascular: No carotid bruit or JVD. Cardiovascular:      Rate and Rhythm: Normal rate and regular rhythm. Heart sounds: Normal heart sounds. Pulmonary:      Effort: Pulmonary effort is normal. No respiratory distress. Breath sounds: Normal breath sounds. No wheezing. Abdominal:      General: Bowel sounds are normal.      Palpations: Abdomen is soft. Tenderness:  There  blood glucose monitor kit and supplies     Si kit by Other route 4 times daily Test 4 times a day & as needed for symptoms of irregular blood glucose. Dispense:  1 kit     Refill:  0    Lancets MISC     Sig: Monitor blood sugar 4 times daily prior to insulin injections. Dispense:  100 each     Refill:  3    blood glucose monitor strips     Si strip by Other route 4 times daily Please dispense test strips compatible with the patient's glucometer. Dispense:  90 strip     Refill:  3      Instructed to monitor blood sugars 4 times daily and keep log. Call the office with any questions, or concerns, or if having difficulty with medications or monitoring sugars. Always take medications as prescribed. Discussed use, benefit, and side effects of prescribed medications. All patient questions answered. Patient voiced understanding. Instructed to continue current medications, diet and exercise. Patient agreed with treatment plan. Follow up as directed. Return in about 4 weeks (around 8/10/2020).     Electronically signed by ROBSON Madrid CNP on 2020

## 2020-07-16 ENCOUNTER — HOSPITAL ENCOUNTER (OUTPATIENT)
Age: 25
Setting detail: SPECIMEN
Discharge: HOME | End: 2020-07-16
Payer: MEDICAID

## 2020-07-16 LAB
ALBUMIN SERPL-MCNC: 4.3 G/DL (ref 3.5–5.2)
ALBUMIN/GLOBULIN RATIO: 1.4 (ref 1–2.5)
ALP BLD-CCNC: 88 U/L (ref 40–129)
ALT SERPL-CCNC: 50 U/L (ref 5–41)
ANION GAP SERPL CALCULATED.3IONS-SCNC: 13 MMOL/L (ref 9–17)
AST SERPL-CCNC: 22 U/L
BILIRUB SERPL-MCNC: 0.43 MG/DL (ref 0.3–1.2)
BUN BLDV-MCNC: 14 MG/DL (ref 6–20)
BUN/CREAT BLD: 18 (ref 9–20)
CALCIUM SERPL-MCNC: 9.3 MG/DL (ref 8.6–10.4)
CHLORIDE BLD-SCNC: 95 MMOL/L (ref 98–107)
CHOLESTEROL, FASTING: 122 MG/DL
CHOLESTEROL/HDL RATIO: 4.2
CO2: 29 MMOL/L (ref 20–31)
CREAT SERPL-MCNC: 0.8 MG/DL (ref 0.7–1.2)
CREATININE URINE: 129.8 MG/DL (ref 39–259)
GFR AFRICAN AMERICAN: >60 ML/MIN
GFR NON-AFRICAN AMERICAN: >60 ML/MIN
GFR SERPL CREATININE-BSD FRML MDRD: ABNORMAL ML/MIN/{1.73_M2}
GFR SERPL CREATININE-BSD FRML MDRD: ABNORMAL ML/MIN/{1.73_M2}
GLUCOSE FASTING: 153 MG/DL (ref 70–99)
HDLC SERPL-MCNC: 29 MG/DL
LDL CHOLESTEROL: 66 MG/DL (ref 0–130)
MICROALBUMIN/CREAT 24H UR: <12 MG/L
MICROALBUMIN/CREAT UR-RTO: NORMAL MCG/MG CREAT
POTASSIUM SERPL-SCNC: 4.1 MMOL/L (ref 3.7–5.3)
SODIUM BLD-SCNC: 137 MMOL/L (ref 135–144)
TOTAL PROTEIN: 7.3 G/DL (ref 6.4–8.3)
TRIGLYCERIDE, FASTING: 136 MG/DL
TSH SERPL DL<=0.05 MIU/L-ACNC: 3.85 MIU/L (ref 0.3–5)
VLDLC SERPL CALC-MCNC: ABNORMAL MG/DL (ref 1–30)

## 2020-07-16 PROCEDURE — 80053 COMPREHEN METABOLIC PANEL: CPT

## 2020-07-16 PROCEDURE — 36415 COLL VENOUS BLD VENIPUNCTURE: CPT

## 2020-07-16 PROCEDURE — 84443 ASSAY THYROID STIM HORMONE: CPT

## 2020-07-16 PROCEDURE — 82570 ASSAY OF URINE CREATININE: CPT

## 2020-07-16 PROCEDURE — 82043 UR ALBUMIN QUANTITATIVE: CPT

## 2020-07-16 PROCEDURE — 80061 LIPID PANEL: CPT

## 2020-08-27 NOTE — TELEPHONE ENCOUNTER
Sofia Melvin is calling to request a refill on the following medication(s):  Requested Prescriptions     Pending Prescriptions Disp Refills    Insulin Pen Needle 32G X 4 MM MISC 100 each 0     Si each by Does not apply route daily       Last Visit Date (If Applicable):      Next Visit Date:    Visit date not found

## 2020-09-02 NOTE — TELEPHONE ENCOUNTER
Neel Lisa is requesting a refill on the following medication(s):  Requested Prescriptions     Pending Prescriptions Disp Refills    insulin lispro, 1 Unit Dial, 100 UNIT/ML SOPN [Pharmacy Med Name: INSULIN LISPRO 100 UNIT/ML PEN] 39 mL      Sig: INJECT BEFORE MEALS PER SLIDING SCALE. ..5 UNITS FOR EVERY 50 ABOVE 150. Collette Ruts Collette Ruts MAX  UNITS DAILY       Last Visit Date (If Applicable):  1/51/5534    Next Visit Date:    Visit date not found

## 2020-09-02 NOTE — TELEPHONE ENCOUNTER
Lenka Amato is calling to request a refill on the following medication(s):  Requested Prescriptions     Pending Prescriptions Disp Refills    insulin lispro, 1 Unit Dial, 100 UNIT/ML SOPN [Pharmacy Med Name: INSULIN LISPRO 100 UNIT/ML PEN] 39 mL      Sig: INJECT BEFORE MEALS PER SLIDING SCALE. ..5 UNITS FOR EVERY 50 ABOVE 150. Bryan Luna MAX  UNITS DAILY       Last Visit Date (If Applicable):  7/26/7802    Next Visit Date:    Visit date not found

## 2020-09-03 RX ORDER — INSULIN LISPRO 100 [IU]/ML
INJECTION, SOLUTION INTRAVENOUS; SUBCUTANEOUS
Qty: 39 ML | Refills: 2 | Status: SHIPPED | OUTPATIENT
Start: 2020-09-03 | End: 2021-03-01

## 2020-10-13 RX ORDER — GLUCOSAM/CHON-MSM1/C/MANG/BOSW 500-416.6
TABLET ORAL
Qty: 100 EACH | Refills: 0 | Status: SHIPPED | OUTPATIENT
Start: 2020-10-13 | End: 2020-12-29

## 2020-10-13 NOTE — TELEPHONE ENCOUNTER
Melody Jones is calling to request a refill on the following medication(s):  Requested Prescriptions     Pending Prescriptions Disp Refills    [address] [facility] [Pharmacy Med Name: Nela WOOD] 100 each 0     Sig: USE AS DIRECTED FOUR TIMES A DAY       Last Visit Date (If Applicable):  9/45/6564    Next Visit Date:    Visit date not found

## 2020-11-16 NOTE — TELEPHONE ENCOUNTER
Tanvi Lo is calling to request a refill on the following medication(s):  Requested Prescriptions     Pending Prescriptions Disp Refills    Insulin Degludec (TRESIBA FLEXTOUCH) 200 UNIT/ML SOPN 15 pen 0     Sig: Inject 70 Units into the skin nightly       Last Visit Date (If Applicable):  7/44/4621    Next Visit Date:    Visit date not found

## 2020-11-17 RX ORDER — INSULIN DEGLUDEC 200 U/ML
70 INJECTION, SOLUTION SUBCUTANEOUS NIGHTLY
Qty: 15 PEN | Refills: 0 | Status: SHIPPED | OUTPATIENT
Start: 2020-11-17 | End: 2021-08-29

## 2020-11-24 ENCOUNTER — VIRTUAL VISIT (OUTPATIENT)
Dept: FAMILY MEDICINE CLINIC | Age: 25
End: 2020-11-24
Payer: MEDICAID

## 2020-11-24 PROCEDURE — 99214 OFFICE O/P EST MOD 30 MIN: CPT | Performed by: NURSE PRACTITIONER

## 2020-11-24 ASSESSMENT — ENCOUNTER SYMPTOMS
COUGH: 1
ABDOMINAL PAIN: 0
WHEEZING: 0
NAUSEA: 0
DIARRHEA: 0
SHORTNESS OF BREATH: 0
EYES NEGATIVE: 1
CONSTIPATION: 0

## 2020-11-24 NOTE — PROGRESS NOTES
2020    TELEHEALTH EVALUATION -- Audio/Visual (During KQMUF-56 public health emergency)    Al Roach (:  1995) has requested an audio/video evaluation for the following concern(s):    Chief Complaint   Patient presents with    Follow-up    Diabetes     bs ck daily in afternoon today was 284 that is average for what he gets also reports Rajani Trevizo is not covered insurance       HPI:   Farxiga 10 mg started 2020. Patient reports taking this for a while, and then forgetting to request refill. He has not been taking the Alexia Alethea for a few months now. Blood sugars are monitored on occasion and averaging 284. He does not monitor his blood pressure. Al Roach is a 31 y.o. male who presents for follow-up of hypertension, diabetes, and hyperlipidemia. He indicates that he is feeling well and denies any symptoms referable to his elevated blood pressure or diabetes. Specifically denies chest pain, palpitations, dyspnea, peripheral edema, thirst, frequent urination, and blurred vision. Current medication regimen is as listed below. He denies any side effects of medication, and has NOT been compliant with taking all of his medications as prescribed. Home glucose readings have been around 284. Checks blood sugars on occasion. Last eye exam: 2020. Diabetic complications include: none    Hyperlipidemia:  No new myalgias or GI upset on atorvastatin (Lipitor). Treatment Adherence:   Medication compliance: Not always compliant with medications. Diet compliance:  Sometimes compliant with diet choices. Weight trend: stable  Current exercise: no regular exercise  Barriers: lack of motivation, stress and time constraints    The ASCVD Risk score (Praveen Valencia, et al., 2013) failed to calculate for the following reasons:     The 2013 ASCVD risk score is only valid for ages 36 to 78    BP Readings from Last 3 Encounters:   20 112/80   06/15/20 124/82   19 (!) 144/104        Pulse Readings from Last 3 Encounters:   07/13/20 101   06/15/20 109   11/05/19 100        Wt Readings from Last 3 Encounters:   07/13/20 (!) 302 lb (137 kg)   06/15/20 299 lb (135.6 kg)   11/05/19 (!) 307 lb (139.3 kg)        Allergies   Allergen Reactions    Cephalexin Hives    Sulfa Antibiotics Hives        Past Medical History:   Diagnosis Date    Essential hypertension 10/29/2019    GERD (gastroesophageal reflux disease)     Hyperlipidemia     Hypothyroidism         Past Surgical History:   Procedure Laterality Date    APPENDECTOMY  2013    ARM SURGERY Left 2006    left arm reconstruction - fracture    TX OFFICE/OUTPT VISIT,PROCEDURE ONLY Left 6/21/2018    SHOULDER ARTHROSCOPY, OPEN CAPSULAR SHIFT REPAIR performed [facility] Jerry [facility] ARTHROSCOPY Left 06/21/2018    with open capsular shift    UPPER GASTROINTESTINAL ENDOSCOPY  2014    Cheri Quan for hematemesis; found hiatal hernia    UPPER GASTROINTESTINAL ENDOSCOPY  05/03/2016    moderate gastritis        Social History     Tobacco Use    Smoking status: Never Smoker    Smokeless tobacco: Former User    Tobacco comment: MIKE Domingo RRT 5/3/2016   Substance Use Topics    Alcohol use: Yes     Alcohol/week: 0.0 standard drinks     Comment: occasional    Drug use: No       Prior to Visit Medications    Medication Sig Taking? Authorizing Provider   Semaglutide,0.25 or 0.5MG/DOS, 2 MG/1.5ML SOPN Inject 0.25 mg into the skin once a week Yes Ashley Hall APRN - CNP   Insulin Degludec (TRESIBA FLEXTOUCH) 200 UNIT/ML SOPN Inject 70 Units into the skin nightly Yes Buzzy Rickn APRN - CNP   TRUEplus Lancets 33G MISC USE AS DIRECTED FOUR TIMES A DAY Yes ROBSON Koehler - CNP   insulin lispro, 1 Unit Dial, 100 UNIT/ML SOPN INJECT BEFORE MEALS PER SLIDING SCALE. ..5 UNITS FOR EVERY 50 ABOVE 150. Pedro Rathke Perdo Rathke MAX  UNITS DAILY Yes Buzzcheli Lown APRN - CNP   Insulin Pen Needle 32G X 4 MM MISC 1 each by Does not apply route daily Yes Andrea Hogan Vivi Baltazar CNP   blood glucose monitor kit and supplies 1 kit by Other route 4 times daily Test 4 times a day & as needed for symptoms of irregular blood glucose. Yes ROBSON Rod CNP   blood glucose monitor strips 1 strip by Other route 4 times daily Please dispense test strips compatible with the patient's glucometer. Yes ROBSON Rod CNP   blood glucose test strips (ASCENSIA AUTODISC VI;ONE TOUCH ULTRA TEST VI) strip Check blood 3 times daily before meals Yes ROBSON Rod CNP   metFORMIN (GLUCOPHAGE-XR) 500 MG extended release tablet Take 2 tablets by mouth 2 times daily with meals.  Yes ROBSON Rod CNP   pantoprazole (PROTONIX) 40 MG tablet Take 1 tablet by mouth daily Yes ROBSON Rod CNP   atorvastatin (LIPITOR) 80 MG tablet Take 1 tablet by mouth nightly Yes ROBSON Rod CNP   lisinopril-hydroCHLOROthiazide (PRINZIDE;ZESTORETIC) 20-25 MG per tablet Take 1 tablet by mouth daily Yes ROBSON Rod CNP   glucose monitoring kit (FREESTYLE) monitoring kit 1 kit by Does not apply route daily Yes Wyatt Salas MD   acetaminophen 650 MG TABS Take 650 mg by mouth every 4 hours as needed Yes [address] Maintenance   Topic Date Due    Varicella vaccine (1 of 2 - 2-dose childhood series) 10/07/1995    HPV vaccine (1 - Male 2-dose series) 10/07/2005    Hepatitis B vaccine (1 of 3 - Risk 3-dose series) 10/07/2013    Flu vaccine (1) 09/01/2020    A1C test (Diabetic or Prediabetic)  09/15/2020    HIV screen  12/14/2027 (Originally 10/7/2009)    Diabetic foot exam  06/21/2021    Diabetic microalbuminuria test  07/16/2021    Lipid screen  07/16/2021    TSH testing  07/16/2021    Potassium monitoring  07/16/2021    Creatinine monitoring  07/16/2021    Diabetic retinal exam  08/12/2021    DTaP/Tdap/Td vaccine (3 - Td) 05/10/2029    Hepatitis A vaccine  Aged Out    Hib vaccine  Aged Out    Meningococcal (ACWY) vaccine  Aged Out  Pneumococcal 0-64 years Vaccine  Aged Out        REVIEW OF SYMPTOMS:     Review of Systems   Constitutional: Positive for fatigue. Negative for chills, diaphoresis and fever. HENT: Negative. Eyes: Negative. Respiratory: Positive for cough. Negative for shortness of breath and wheezing. Cardiovascular: Negative for chest pain, palpitations and leg swelling. Gastrointestinal: Negative for abdominal pain, constipation, diarrhea and nausea. Endocrine: Negative for cold intolerance, heat intolerance, polydipsia, polyphagia and polyuria. Genitourinary: Negative. Musculoskeletal: Negative for arthralgias and myalgias. Skin: Negative. Allergic/Immunologic: Negative for environmental allergies. Neurological: Negative for dizziness, light-headedness and headaches. Psychiatric/Behavioral: Negative for agitation, decreased concentration, dysphoric mood, self-injury, sleep disturbance and suicidal ideas. The patient is not nervous/anxious. Patient-Reported Vitals 11/24/2020   Patient-Reported Weight 302         PHYSICAL EXAM:     [ INSTRUCTIONS:  \"[x]\" Indicates a positive item  \"[]\" Indicates a negative item  -- DELETE ALL ITEMS NOT EXAMINED]    Constitutional:   [x] Appears well-developed and well-nourished [x] No apparent distress    [] Abnormal-     Mental status:  [x] Alert and awake  [x] Oriented to person/place/time [x]Able to follow commands      Eyes:  EOM    []  Normal  [] Abnormal-  Sclera  [x]  Normal  [] Abnormal -         Discharge [x]  None visible  [] Abnormal -    HENT:   [x] Normocephalic, atraumatic. [] Abnormal   [x] Mouth/Throat: Mucous membranes are moist.     External Ears:  [x] Normal  [] Abnormal-     Neck:  [x] No visualized mass     Pulmonary/Chest:   [x] Respiratory effort normal.  [x] No visualized signs of difficulty breathing or respiratory distress  [] Abnormal-      Musculoskeletal:    [] Normal gait with no signs of ataxia.   [] Normal range of motion of neck  [] Abnormal-     Neurological:      [x] No Facial Asymmetry (Cranial nerve 7 motor function) (limited exam to video visit)          [x] No gaze palsy        [] Abnormal-         Skin:        [x] No significant exanthematous lesions or discoloration noted on facial skin         [] Abnormal-            Psychiatric:       [x] Normal Affect [x] No Hallucinations        [] Abnormal-     Other pertinent observable physical exam findings: none    PHQ Scores 6/15/2020 4/22/2019 4/5/2018   PHQ2 Score 0 0 0   PHQ9 Score 0 0 0     Interpretation of Total Score Depression Severity: 1-4 = Minimal depression, 5-9 = Mild depression, 10-14 = Moderate depression, 15-19 = Moderately severe depression, 20-27 = Severe depression     PLAN:       ICD-10-CM    1. Type 2 diabetes mellitus with hyperglycemia, with long-term current use of insulin ([facility])  E11.65 Semaglutide,0.25 or 0.5MG/DOS, 2 MG/1.5ML SOPN    Z79.4    2. Essential hypertension  I10    3. Mixed hyperlipidemia  E78.2    4. Hypothyroidism, unspecified type  E03.9    5. Obstructive sleep apnea  G47.33    6. Morbid obesity due to excess calories ([facility])  E66.01    7. Gastroesophageal reflux disease, unspecified whether esophagitis present  K21.9    8. Fatigue, unspecified type  R53.83    9. Cough  R05       Medications Discontinued During This Encounter   Medication Reason    empagliflozin (JARDIANCE) 25 MG tablet Alternate therapy    dapagliflozin (FARXIGA) 10 MG tablet Alternate therapy     Start taking Ozempic 0.25 mg weekly. Monitor blood sugars and keep a log to bring to follow-up appointment in 4 weeks. Maximus Batista, patient is not taking this medication. Discussed use, benefit, and side effects of prescribed medications. All patient questions answered. Patient voiced understanding. Health Maintenance reviewed. Instructed to continue current medications, diet and exercise. Patient agreed with treatment plan. Follow up as directed.       Return in about 4 weeks (around 12/22/2020). Gurpreet Gerber is a 31 y.o. male being evaluated by a Virtual Visit (video visit) encounter to address concerns as mentioned above. A caregiver was present when appropriate. Due to this being a TeleHealth encounter (During FEZHT-60 public health emergency), evaluation of the following organ systems was limited: Vitals/Constitutional/EENT/Resp/CV/GI//MS/Neuro/Skin/Heme-Lymph-Imm. Pursuant to the emergency declaration under the [address] [place] [address], [address] [place] [address] authority and the [employer] Act, this Virtual Visit was conducted with patient's (and/or legal guardian's) consent, to reduce the patient's risk of exposure to COVID-19 and provide necessary medical care. The patient (and/or legal guardian) has also been advised to contact this office for worsening conditions or problems, and seek emergency medical treatment and/or call 911 if deemed necessary. Services were provided through a video synchronous discussion virtually to substitute for in-person clinic visit. Patient and provider were located at their individual homes. Electronically signed by ROBSON Gabriel CNP on 11/24/2020 at 4:57 PM.     An electronic signature was used to authenticate this note.

## 2020-12-29 RX ORDER — GLUCOSAMINE HCL/CHONDROITIN SU 500-400 MG
1 CAPSULE ORAL 4 TIMES DAILY
Qty: 100 STRIP | Refills: 3 | Status: SHIPPED | OUTPATIENT
Start: 2020-12-29

## 2020-12-29 NOTE — TELEPHONE ENCOUNTER
Shankar Galo is calling to request a refill on the following medication(s):  Requested Prescriptions     Pending Prescriptions Disp Refills    blood glucose monitor strips 100 strip 3     Si strip by Other route 4 times daily Please dispense test strips compatible with the patient's glucometer True Metrix.        Last Visit Date (If Applicable):  10/74/3082    Next Visit Date:    Visit date not found

## 2021-01-24 DIAGNOSIS — I10 ESSENTIAL HYPERTENSION: ICD-10-CM

## 2021-01-24 DIAGNOSIS — E11.65 TYPE 2 DIABETES MELLITUS WITH HYPERGLYCEMIA, WITH LONG-TERM CURRENT USE OF INSULIN (HCC): ICD-10-CM

## 2021-01-24 DIAGNOSIS — Z79.4 TYPE 2 DIABETES MELLITUS WITH HYPERGLYCEMIA, WITH LONG-TERM CURRENT USE OF INSULIN (HCC): ICD-10-CM

## 2021-01-24 DIAGNOSIS — E78.2 MIXED HYPERLIPIDEMIA: ICD-10-CM

## 2021-01-25 RX ORDER — LISINOPRIL AND HYDROCHLOROTHIAZIDE 25; 20 MG/1; MG/1
TABLET ORAL
Qty: 30 TABLET | Refills: 5 | Status: SHIPPED | OUTPATIENT
Start: 2021-01-25 | End: 2021-08-02

## 2021-01-25 RX ORDER — ATORVASTATIN CALCIUM 80 MG/1
80 TABLET, FILM COATED ORAL NIGHTLY
Qty: 30 TABLET | Refills: 5 | Status: SHIPPED | OUTPATIENT
Start: 2021-01-25 | End: 2021-08-02

## 2021-01-25 RX ORDER — METFORMIN HYDROCHLORIDE 500 MG/1
TABLET, EXTENDED RELEASE ORAL
Qty: 120 TABLET | Refills: 5 | Status: SHIPPED | OUTPATIENT
Start: 2021-01-25 | End: 2021-08-02

## 2021-01-25 NOTE — TELEPHONE ENCOUNTER
Milo Chavez is calling to request a refill on the following medication(s):  Requested Prescriptions     Pending Prescriptions Disp Refills    atorvastatin (LIPITOR) 80 MG tablet [Pharmacy Med Name: ATORVASTATIN 80 MG TABLET] 30 tablet 5     Sig: take 1 tablet by mouth nightly    lisinopril-hydroCHLOROthiazide (PRINZIDE;ZESTORETIC) 20-25 MG per tablet [Pharmacy Med Name: LISINOPRIL-HCTZ 20-25 MG TAB] 30 tablet 5     Sig: take 1 tablet by mouth once daily    metFORMIN (GLUCOPHAGE-XR) 500 MG extended release tablet [Pharmacy Med Name: METFORMIN HCL  MG TABLET] 120 tablet 5     Sig: take 2 tablets by mouth twice a day with meals       Last Visit Date (If Applicable):  55/01/2580    Next Visit Date:    Visit date not found

## 2021-02-21 DIAGNOSIS — K21.9 GASTROESOPHAGEAL REFLUX DISEASE: ICD-10-CM

## 2021-02-22 RX ORDER — PANTOPRAZOLE SODIUM 40 MG/1
TABLET, DELAYED RELEASE ORAL
Qty: 30 TABLET | Refills: 5 | Status: SHIPPED | OUTPATIENT
Start: 2021-02-22 | Stop reason: SDUPTHER

## 2021-02-22 NOTE — TELEPHONE ENCOUNTER
Marcelo Mejia is calling to request a refill on the following medication(s):  Requested Prescriptions     Pending Prescriptions Disp Refills    pantoprazole (PROTONIX) 40 MG tablet [Pharmacy Med Name: PANTOPRAZOLE SOD DR 40 MG TAB] 30 tablet 5     Sig: take 1 tablet by mouth once daily       Last Visit Date (If Applicable):  78/73/6001    Next Visit Date:    Visit date not found

## 2021-02-26 DIAGNOSIS — Z79.4 TYPE 2 DIABETES MELLITUS WITH HYPERGLYCEMIA, WITH LONG-TERM CURRENT USE OF INSULIN (HCC): ICD-10-CM

## 2021-02-26 DIAGNOSIS — E11.65 TYPE 2 DIABETES MELLITUS WITH HYPERGLYCEMIA, WITH LONG-TERM CURRENT USE OF INSULIN (HCC): ICD-10-CM

## 2021-03-01 RX ORDER — INSULIN LISPRO 100 [IU]/ML
INJECTION, SOLUTION INTRAVENOUS; SUBCUTANEOUS
Qty: 39 ML | Refills: 2 | Status: SHIPPED | OUTPATIENT
Start: 2021-03-01 | End: 2021-08-29

## 2021-03-01 NOTE — TELEPHONE ENCOUNTER
Gilson Valentino is calling to request a refill on the following medication(s):  Requested Prescriptions     Pending Prescriptions Disp Refills    insulin lispro, 1 Unit Dial, 100 UNIT/ML SOPN [Pharmacy Med Name: INSULIN LISPRO 100 UNIT/ML PEN] 39 mL 2     Sig: INJECT SUBCUTANEOUSLY BEFORE MEALS PER SLIDING SCALE. ..5 UNITS FOR EVERY 50 ABOVE 150. Liss Severance Liss Severance MAX  UNITS DAILY       Last Visit Date (If Applicable):  87/91/8253    Next Visit Date:    Visit date not found

## 2021-04-03 DIAGNOSIS — E11.65 TYPE 2 DIABETES MELLITUS WITH HYPERGLYCEMIA, WITH LONG-TERM CURRENT USE OF INSULIN (HCC): ICD-10-CM

## 2021-04-03 DIAGNOSIS — Z79.4 TYPE 2 DIABETES MELLITUS WITH HYPERGLYCEMIA, WITH LONG-TERM CURRENT USE OF INSULIN (HCC): ICD-10-CM

## 2021-04-05 RX ORDER — LIRAGLUTIDE 6 MG/ML
INJECTION SUBCUTANEOUS
Qty: 6 ML | Refills: 2 | Status: SHIPPED | OUTPATIENT
Start: 2021-04-05 | End: 2021-08-23 | Stop reason: SINTOL

## 2021-04-05 NOTE — TELEPHONE ENCOUNTER
Mounika Blanc is calling to request a refill on the following medication(s):  Requested Prescriptions     Pending Prescriptions Disp Refills    VICTOZA 18 MG/3ML SOPN SC injection [Pharmacy Med Name: Tad Dom 2-AMY 18 MG/3 ML PEN] 6 mL      Sig: inject 0.6 milligram INTO THE SKIN once daily FOR 1 WEEK THEN INCREASE TO 1.2 milligram once daily       Last Visit Date (If Applicable):  49/71/9807    Next Visit Date:    Visit date not found

## 2021-08-01 DIAGNOSIS — I10 ESSENTIAL HYPERTENSION: ICD-10-CM

## 2021-08-01 DIAGNOSIS — E11.65 TYPE 2 DIABETES MELLITUS WITH HYPERGLYCEMIA, WITH LONG-TERM CURRENT USE OF INSULIN (HCC): ICD-10-CM

## 2021-08-01 DIAGNOSIS — E78.2 MIXED HYPERLIPIDEMIA: ICD-10-CM

## 2021-08-01 DIAGNOSIS — Z79.4 TYPE 2 DIABETES MELLITUS WITH HYPERGLYCEMIA, WITH LONG-TERM CURRENT USE OF INSULIN (HCC): ICD-10-CM

## 2021-08-02 RX ORDER — METFORMIN HYDROCHLORIDE 500 MG/1
TABLET, EXTENDED RELEASE ORAL
Qty: 120 TABLET | Refills: 0 | Status: SHIPPED | OUTPATIENT
Start: 2021-08-02 | End: 2021-10-18

## 2021-08-02 RX ORDER — ATORVASTATIN CALCIUM 80 MG/1
80 TABLET, FILM COATED ORAL NIGHTLY
Qty: 30 TABLET | Refills: 0 | Status: SHIPPED | OUTPATIENT
Start: 2021-08-02 | End: 2021-10-18

## 2021-08-02 RX ORDER — LISINOPRIL AND HYDROCHLOROTHIAZIDE 25; 20 MG/1; MG/1
TABLET ORAL
Qty: 30 TABLET | Refills: 0 | Status: SHIPPED | OUTPATIENT
Start: 2021-08-02 | End: 2021-10-18

## 2021-08-02 NOTE — TELEPHONE ENCOUNTER
Gerber Pollard is requesting a refill on the following medication(s):  Requested Prescriptions     Pending Prescriptions Disp Refills    lisinopril-hydroCHLOROthiazide (PRINZIDE;ZESTORETIC) 20-25 MG per tablet [Pharmacy Med Name: LISINOPRIL-HCTZ 20-25 MG TAB] 30 tablet 5     Sig: take 1 tablet by mouth once daily    atorvastatin (LIPITOR) 80 MG tablet [Pharmacy Med Name: ATORVASTATIN 80 MG TABLET] 30 tablet 5     Sig: take 1 tablet by mouth nightly    metFORMIN (GLUCOPHAGE-XR) 500 MG extended release tablet [Pharmacy Med Name: METFORMIN HCL  MG TABLET] 120 tablet 5     Sig: take 2 tablets by mouth twice a day with meals       Last Visit Date (If Applicable):  57/19/7076    Next Visit Date:    Visit date not found

## 2021-08-23 ENCOUNTER — OFFICE VISIT (OUTPATIENT)
Dept: FAMILY MEDICINE CLINIC | Age: 26
End: 2021-08-23
Payer: MEDICAID

## 2021-08-23 VITALS
WEIGHT: 315 LBS | DIASTOLIC BLOOD PRESSURE: 84 MMHG | SYSTOLIC BLOOD PRESSURE: 128 MMHG | BODY MASS INDEX: 44.74 KG/M2 | HEART RATE: 110 BPM | OXYGEN SATURATION: 96 %

## 2021-08-23 DIAGNOSIS — Z79.4 TYPE 2 DIABETES MELLITUS WITH HYPERGLYCEMIA, WITH LONG-TERM CURRENT USE OF INSULIN (HCC): Primary | ICD-10-CM

## 2021-08-23 DIAGNOSIS — L60.0 INGROWN TOENAIL OF RIGHT FOOT: ICD-10-CM

## 2021-08-23 DIAGNOSIS — E11.65 TYPE 2 DIABETES MELLITUS WITH HYPERGLYCEMIA, WITH LONG-TERM CURRENT USE OF INSULIN (HCC): Primary | ICD-10-CM

## 2021-08-23 DIAGNOSIS — E78.2 MIXED HYPERLIPIDEMIA: ICD-10-CM

## 2021-08-23 DIAGNOSIS — G47.33 OBSTRUCTIVE SLEEP APNEA: ICD-10-CM

## 2021-08-23 DIAGNOSIS — E66.01 MORBID OBESITY DUE TO EXCESS CALORIES (HCC): ICD-10-CM

## 2021-08-23 DIAGNOSIS — I10 ESSENTIAL HYPERTENSION: ICD-10-CM

## 2021-08-23 DIAGNOSIS — K21.9 GASTROESOPHAGEAL REFLUX DISEASE, UNSPECIFIED WHETHER ESOPHAGITIS PRESENT: ICD-10-CM

## 2021-08-23 DIAGNOSIS — M25.312 SHOULDER INSTABILITY, LEFT: ICD-10-CM

## 2021-08-23 DIAGNOSIS — E03.9 HYPOTHYROIDISM, UNSPECIFIED TYPE: ICD-10-CM

## 2021-08-23 LAB — HBA1C MFR BLD: 8.5 %

## 2021-08-23 PROCEDURE — 3052F HG A1C>EQUAL 8.0%<EQUAL 9.0%: CPT | Performed by: NURSE PRACTITIONER

## 2021-08-23 PROCEDURE — 99215 OFFICE O/P EST HI 40 MIN: CPT | Performed by: NURSE PRACTITIONER

## 2021-08-23 PROCEDURE — 83036 HEMOGLOBIN GLYCOSYLATED A1C: CPT | Performed by: NURSE PRACTITIONER

## 2021-08-23 PROCEDURE — 99214 OFFICE O/P EST MOD 30 MIN: CPT

## 2021-08-23 RX ORDER — GLUCOSAMINE HCL/CHONDROITIN SU 500-400 MG
CAPSULE ORAL
Qty: 100 STRIP | Refills: 0 | Status: SHIPPED | OUTPATIENT
Start: 2021-08-23

## 2021-08-23 RX ORDER — LANCETS 30 GAUGE
EACH MISCELLANEOUS
Qty: 100 EACH | Refills: 3 | Status: SHIPPED | OUTPATIENT
Start: 2021-08-23

## 2021-08-23 SDOH — ECONOMIC STABILITY: FOOD INSECURITY: WITHIN THE PAST 12 MONTHS, THE FOOD YOU BOUGHT JUST DIDN'T LAST AND YOU DIDN'T HAVE MONEY TO GET MORE.: NEVER TRUE

## 2021-08-23 SDOH — ECONOMIC STABILITY: FOOD INSECURITY: WITHIN THE PAST 12 MONTHS, YOU WORRIED THAT YOUR FOOD WOULD RUN OUT BEFORE YOU GOT MONEY TO BUY MORE.: NEVER TRUE

## 2021-08-23 ASSESSMENT — ENCOUNTER SYMPTOMS
ABDOMINAL PAIN: 0
HEARTBURN: 1
VOMITING: 0
CHOKING: 0
SORE THROAT: 0
WHEEZING: 0
COUGH: 0
SHORTNESS OF BREATH: 0
BELCHING: 0
CONSTIPATION: 0
DIARRHEA: 0
NAUSEA: 0
GLOBUS SENSATION: 0

## 2021-08-23 ASSESSMENT — SLEEP AND FATIGUE QUESTIONNAIRES
HAVE YOU EVER BEEN TOLD YOU STOP BREATHING OR HOLD YOUR BREATH WHILE SLEEPING: 3
DO YOU HAVE CONGESTIVE HEART FAILURE (CHF), HYPERTENSION, CORONARY DISEASE, CARDIAC ARRYTHMIAS, DIABETES, OR HAD A STROKE: 2
HOW LIKELY ARE YOU TO NOD OFF OR FALL ASLEEP WHEN YOU ARE A PASSENGER IN A CAR FOR AN HOUR WITHOUT A BREAK: 2
ESS TOTAL SCORE: 12
DO YOU WAKE UP TIRED, ARE YOU TIRED DURING THE DAY, OR DO YOU TAKE NAPS: 1
SLEEP APNEA SCREENER SCORE: 9
HOW LIKELY ARE YOU TO NOD OFF OR FALL ASLEEP WHILE LYING DOWN TO REST IN THE AFTERNOON WHEN CIRCUMSTANCES PERMIT: 3
DO YOU WAKE UP MORE THAN ONCE A NIGHT TO URINATE: 1
HAVE YOU BEEN TOLD YOU SNORE, EVEN INTERMITTENTLY: 1
NECK CIRCUMFERENCE (INCHES): 20
HOW LIKELY ARE YOU TO NOD OFF OR FALL ASLEEP WHILE WATCHING TV: 2
HOW LIKELY ARE YOU TO NOD OFF OR FALL ASLEEP WHILE SITTING QUIETLY AFTER LUNCH WITHOUT ALCOHOL: 2
HOW LIKELY ARE YOU TO NOD OFF OR FALL ASLEEP WHILE SITTING AND TALKING TO SOMEONE: 0
HOW LIKELY ARE YOU TO NOD OFF OR FALL ASLEEP IN A CAR, WHILE STOPPED FOR A FEW MINUTES IN TRAFFIC: 0
HOW LIKELY ARE YOU TO NOD OFF OR FALL ASLEEP WHILE SITTING AND READING: 2
HOW LIKELY ARE YOU TO NOD OFF OR FALL ASLEEP WHILE SITTING INACTIVE IN A PUBLIC PLACE: 1

## 2021-08-23 ASSESSMENT — PATIENT HEALTH QUESTIONNAIRE - PHQ9
SUM OF ALL RESPONSES TO PHQ9 QUESTIONS 1 & 2: 0
1. LITTLE INTEREST OR PLEASURE IN DOING THINGS: 0
SUM OF ALL RESPONSES TO PHQ QUESTIONS 1-9: 0
2. FEELING DOWN, DEPRESSED OR HOPELESS: 0

## 2021-08-23 ASSESSMENT — SOCIAL DETERMINANTS OF HEALTH (SDOH): HOW HARD IS IT FOR YOU TO PAY FOR THE VERY BASICS LIKE FOOD, HOUSING, MEDICAL CARE, AND HEATING?: NOT HARD AT ALL

## 2021-08-23 NOTE — PROGRESS NOTES
problem has been gradually worsening. The quality of the pain is described as sharp (sometimes \"pops\"). The pain is at a severity of 5/10. The pain is moderate. Associated symptoms include joint locking. Pertinent negatives include no fever, limited range of motion, numbness or tingling. The symptoms are aggravated by activity. He has tried acetaminophen (Had surgery and physical therapy and did well.) for the symptoms. The treatment provided no relief. His past medical history is significant for diabetes. Hypertension, diabetes, and hyperlipidemia  He indicates that he is feeling well and denies any symptoms referable to his elevated blood pressure or diabetes. Specifically denies chest pain, palpitations, dyspnea, peripheral edema, thirst, frequent urination, and blurred vision. Current medication regimen is as listed below. He denies any side effects of medication, and has been compliant, taking it regularly. Home glucose readings have not been monitored. Last eye exam: overdue. Diabetic complications include: none    Treatment Adherence:   Medication compliance:  Not compliant  Diet compliance:  Not compliant, does not follow a diabetic diet  Weight trend: increasing  Current exercise: no regular exercise  Barriers: lack of motivation    Sleep Medicine 8/23/2021   Sitting and reading 2   Watching TV 2   Sitting, inactive in a public place (e.g. a theatre or a meeting) 1   As a passenger in a car for an hour without a break 2   Lying down to rest in the afternoon when circumstances permit 3   Sitting and talking to someone 0   Sitting quietly after a lunch without alcohol 2   In a car, while stopped for a few minutes in traffic 0   Total score 12   Neck circumference 20       The ASCVD Risk score (Thania Jenkins., et al., 2013) failed to calculate for the following reasons:     The 2013 ASCVD risk score is only valid for ages 36 to 78    BP Readings from Last 3 Encounters:   08/23/21 128/84   07/13/20 112/80 06/15/20 124/82          (goal 120/80)    Pulse Readings from Last 3 Encounters:   08/23/21 110   07/13/20 101   06/15/20 109        Wt Readings from Last 3 Encounters:   08/23/21 (!) 320 lb 12.8 oz (145.5 kg)   07/13/20 (!) 302 lb (137 kg)   06/15/20 299 lb (135.6 kg)       Past Medical History:   Diagnosis Date    Essential hypertension 10/29/2019    GERD (gastroesophageal reflux disease)     Hyperlipidemia     Hypothyroidism       Past Surgical History:   Procedure Laterality Date    APPENDECTOMY  2013    ARM SURGERY Left 2006    left arm reconstruction - fracture    MO OFFICE/OUTPT VISIT,PROCEDURE ONLY Left 6/21/2018    SHOULDER ARTHROSCOPY, OPEN CAPSULAR SHIFT REPAIR performed by Dillon Romero MD at [address] ARTHROSCOPY Left 06/21/2018    with open capsular shift    UPPER GASTROINTESTINAL ENDOSCOPY  2014    Mart Jimenez for hematemesis; found hiatal hernia    UPPER GASTROINTESTINAL ENDOSCOPY  05/03/2016    moderate gastritis       Family History   Problem Relation Age of Onset    High Blood Pressure Mother     Heart Attack Mother     High Blood Pressure Father     Heart Attack Maternal Grandmother     Heart Attack Maternal Grandfather     Diabetes Maternal Grandfather        Social History     Tobacco Use    Smoking status: Never Smoker    Smokeless tobacco: Former User    Tobacco comment: MIKE Domingo RRT 5/3/2016   Substance Use Topics    Alcohol use: Yes     Alcohol/week: 0.0 standard drinks     Comment: occasional        Current Outpatient Medications   Medication Sig Dispense Refill    dapagliflozin (FARXIGA) 5 MG tablet Take 1 tablet by mouth every morning 30 tablet 2    blood glucose monitor kit and supplies 1 kit by Other route 2 times daily Test 4 times a day & as needed for symptoms of irregular blood glucose. 1 kit 0    Lancets MISC Monitor blood sugar 4 times daily and as needed.  100 each 3    blood glucose monitor strips Test 4 times a day & as needed for symptoms of irregular blood glucose. Dispense sufficient amount for indicated testing frequency plus additional to accommodate PRN testing needs. 100 strip 0    lisinopril-hydroCHLOROthiazide (PRINZIDE;ZESTORETIC) 20-25 MG per tablet take 1 tablet by mouth once daily 30 tablet 0    atorvastatin (LIPITOR) 80 MG tablet take 1 tablet by mouth nightly 30 tablet 0    metFORMIN (GLUCOPHAGE-XR) 500 MG extended release tablet take 2 tablets by mouth twice a day with meals 120 tablet 0    insulin lispro, 1 Unit Dial, 100 UNIT/ML SOPN INJECT SUBCUTANEOUSLY BEFORE MEALS PER SLIDING SCALE. ..5 UNITS FOR EVERY 50 ABOVE 150. Theodore Reynold Theodore Reynold MAX  UNITS DAILY 39 mL 2    pantoprazole (PROTONIX) 40 MG tablet take 1 tablet by mouth once daily 30 tablet 5    Lancets (ONETOUCH DELICA PLUS [id number]) MISC USE AS DIRECTED FOUR TIMES A  each 3    blood glucose monitor strips 1 strip by Other route 4 times daily Please dispense test strips compatible with the patient's glucometer True Metrix. 100 strip 3    Insulin Degludec (TRESIBA FLEXTOUCH) 200 UNIT/ML SOPN Inject 70 Units into the skin nightly 15 pen 0    Insulin Pen Needle 32G X 4 MM MISC 1 each by Does not apply route daily 100 each 3    glucose monitoring kit (FREESTYLE) monitoring kit 1 kit by Does not apply route daily 1 kit 0    acetaminophen 650 MG TABS Take 650 mg by mouth every 4 hours as needed 120 tablet 0     No current facility-administered medications for this visit.      Allergies   Allergen Reactions    Cephalexin Hives    Sulfa Antibiotics Hives       Health Maintenance   Topic Date Due    Hepatitis C screen  Never done    Varicella vaccine (1 of 2 - 2-dose childhood series) Never done    HPV vaccine (1 - Male 2-dose series) Never done    COVID-19 Vaccine (1) Never done    Hepatitis B vaccine (1 of 3 - Risk 3-dose series) Never done    Diabetic microalbuminuria test  07/16/2021    Lipid screen  07/16/2021    TSH testing  07/16/2021    Potassium monitoring 07/16/2021    Creatinine monitoring  07/16/2021    Diabetic retinal exam  08/12/2021    HIV screen  12/14/2027 (Originally 10/7/2009)    Flu vaccine (1) 09/01/2021    Diabetic foot exam  08/23/2022    A1C test (Diabetic or Prediabetic)  08/23/2022    DTaP/Tdap/Td vaccine (3 - Td or Tdap) 05/10/2029    Pneumococcal 0-64 years Vaccine (2 of 2 - PPSV23) 10/07/2059    Hepatitis A vaccine  Aged Out    Hib vaccine  Aged Out    Meningococcal (ACWY) vaccine  Aged Out       Subjective:     Review of Systems   Constitutional: Positive for fatigue. Negative for appetite change, chills, diaphoresis and fever. HENT: Negative. Negative for sore throat. Eyes: Negative. Respiratory: Negative for cough, choking, shortness of breath and wheezing. Cardiovascular: Negative for chest pain, palpitations and leg swelling. Gastrointestinal: Positive for heartburn. Negative for abdominal pain, constipation, diarrhea, dysphagia, nausea and vomiting. Endocrine: Negative for cold intolerance, heat intolerance, polydipsia, polyphagia and polyuria. Genitourinary: Negative. Musculoskeletal: Positive for arthralgias (left shoulder). Negative for myalgias. Skin:        Ingrown toenail on right foot   Allergic/Immunologic: Negative for environmental allergies. Neurological: Negative for dizziness, tingling, light-headedness, numbness and headaches. Psychiatric/Behavioral: Positive for sleep disturbance. Negative for agitation, decreased concentration, dysphoric mood, self-injury and suicidal ideas. The patient is not nervous/anxious. Objective:     Vitals:    08/23/21 1626   BP: 128/84   Pulse: 110   SpO2: 96%   Weight: (!) 320 lb 12.8 oz (145.5 kg)        Estimated body mass index is 44.74 kg/m² as calculated from the following:    Height as of 6/15/20: 5' 11\" (1.803 m). Weight as of this encounter: 320 lb 12.8 oz (145.5 kg). Physical Exam  Constitutional:       Appearance: Normal appearance.  He is well-developed and well-groomed. He is obese. HENT:      Head: Normocephalic. Nose: Nose normal.      Mouth/Throat:      Mouth: Mucous membranes are moist.   Eyes:      Conjunctiva/sclera: Conjunctivae normal.      Pupils: Pupils are equal, round, and reactive to light. Neck:      Thyroid: No thyromegaly. Vascular: No carotid bruit or JVD. Cardiovascular:      Rate and Rhythm: Normal rate and regular rhythm. Heart sounds: Normal heart sounds. Pulmonary:      Effort: Pulmonary effort is normal. No respiratory distress. Breath sounds: Normal breath sounds. No wheezing. Abdominal:      General: Bowel sounds are normal.      Palpations: Abdomen is soft. Tenderness: There is no abdominal tenderness. Musculoskeletal:      Left shoulder: Decreased range of motion (Positive apprehension). Cervical back: Neck supple. Right lower leg: No edema. Left lower leg: No edema. Comments: Ingrown toenail right foot   Lymphadenopathy:      Cervical: No cervical adenopathy. Skin:     Capillary Refill: Capillary refill takes less than 2 seconds. Neurological:      Mental Status: He is alert and oriented to person, place, and time. Psychiatric:         Mood and Affect: Mood normal.         Behavior: Behavior is cooperative.        PHQ Scores 8/23/2021 6/15/2020 4/22/2019 4/5/2018   PHQ2 Score 0 0 0 0   PHQ9 Score 0 0 0 0     Interpretation of Total Score Depression Severity: 1-4 = Minimal depression, 5-9 = Mild depression, 10-14 = Moderate depression, 15-19 = Moderately severe depression, 20-27 = Severe depression    Lab Results   Component Value Date    LABA1C 8.5 08/23/2021    LABA1C 10.8 06/15/2020    LABA1C 10.0 11/05/2019     Lab Results   Component Value Date    GLUF 153 (H) 07/16/2020    LABMICR CANNOT BE CALCULATED 07/16/2020    LDLCALC 120.6 07/30/2019    CREATININE 0.80 07/16/2020       Lab Results   Component Value Date     07/16/2020    K 4.1 07/16/2020 CL 95 (L) 07/16/2020    CO2 29 07/16/2020    BUN 14 07/16/2020    CREATININE 0.80 07/16/2020    GLUCOSE 185 (H) 10/22/2018    CALCIUM 9.3 07/16/2020    PROT 7.3 07/16/2020    LABALBU 4.3 07/16/2020    BILITOT 0.43 07/16/2020    ALKPHOS 88 07/16/2020    AST 22 07/16/2020    ALT 50 (H) 07/16/2020    LABGLOM >60 07/16/2020    GFRAA >60 07/16/2020    AGRATIO 1.3 10/22/2018    GLOB 3.3 10/22/2018       Lab Results   Component Value Date    LABA1C 8.5 08/23/2021    LABA1C 10.8 06/15/2020    LABA1C 10.0 11/05/2019     Lab Results   Component Value Date    LABMICR CANNOT BE CALCULATED 07/16/2020    CREATININE 0.80 07/16/2020     Lab Results   Component Value Date     07/16/2020    K 4.1 07/16/2020    CL 95 (L) 07/16/2020    CO2 29 07/16/2020    BUN 14 07/16/2020    CREATININE 0.80 07/16/2020    GLUCOSE 185 (H) 10/22/2018    CALCIUM 9.3 07/16/2020    CALCIUM 9.5 10/22/2018    CALCIUM 9.7 08/21/2017     Lab Results   Component Value Date    CHOL 181 07/30/2019    TRIG 147 07/30/2019    HDL 29 (L) 07/16/2020    LDLCALC 120.6 07/30/2019       Assessment:     Hola Barrientos was seen today for diabetes, hypertension, gastroesophageal reflux, hyperlipidemia, hypothyroidism and shoulder pain. Diagnoses and all orders for this visit:    Type 2 diabetes mellitus with hyperglycemia, with long-term current use of insulin ([facility])  -     POCT glycosylated hemoglobin (Hb A1C)  -      DIABETES FOOT EXAM  -     Lipid, Fasting; Future  -     Microalbumin, Ur; Future  -     Comprehensive Metabolic Panel, Fasting; Future  -     dapagliflozin (FARXIGA) 5 MG tablet; Take 1 tablet by mouth every morning  -     blood glucose monitor kit and supplies; 1 kit by Other route 2 times daily Test 4 times a day & as needed for symptoms of irregular blood glucose. -     Lancets MISC; Monitor blood sugar 4 times daily and as needed. -     blood glucose monitor strips; Test 4 times a day & as needed for symptoms of irregular blood glucose.  Dispense sufficient amount for indicated testing frequency plus additional to accommodate PRN testing needs. -     Meera Amin DPM, Podiatry, [place]    Essential hypertension  -     Lipid, Fasting; Future  -     Microalbumin, Ur; Future  -     Comprehensive Metabolic Panel, Fasting; Future  -     CBC Auto Differential; Future    Mixed hyperlipidemia  -     Lipid, Fasting; Future  -     Comprehensive Metabolic Panel, Fasting; Future    Gastroesophageal reflux disease, unspecified whether esophagitis present  -     TSH without Reflex; Future  -     CBC Auto Differential; Future    Obstructive sleep apnea  -     CBC Auto Differential; Future  -     External Referral To Pulmonology    Hypothyroidism, unspecified type  -     TSH without Reflex; Future    Morbid obesity due to excess calories (HCC)  -     TSH without Reflex;  Future    Shoulder instability, left  -     Patricia Samuel MD, Orthopedic Surgery, [facility] toenail of right foot  -     Celeste Hamlin, Podiatry, [facility]        Results for POC orders placed in visit on 08/23/21   POCT glycosylated hemoglobin (Hb A1C)   Result Value Ref Range    Hemoglobin A1C 8.5 %        Plan:     Orders Placed This Encounter   Procedures    Lipid, Fasting     Standing Status:   Future     Standing Expiration Date:   8/22/2022    Microalbumin, Ur     Standing Status:   Future     Standing Expiration Date:   8/22/2022    Comprehensive Metabolic Panel, Fasting     Standing Status:   Future     Standing Expiration Date:   8/22/2022    TSH without Reflex     Standing Status:   Future     Standing Expiration Date:   8/22/2022    CBC Auto Differential     Standing Status:   Future     Standing Expiration Date:   10/21/2021    External Referral To Pulmonology     Referral Priority:   Routine     Referral Type:   Eval and Treat     Referral Reason:   Specialty Services Required     Requested Specialty:   Pulmonology     Number of Visits Requested:   Bandar Astudillo MD, Orthopedic Surgery, [place]     Referral Priority:   Routine     Referral Type:   Eval and Treat     Referral Reason:   Specialty Services Required     Referred to Provider:   Bart Angelo MD     Requested Specialty:   Orthopedic Surgery     Number of Visits Requested:   1   [address], [place], Podiatry, [facility]     Referral Priority:   Routine     Referral Type:   Eval and Treat     Referral Reason:   Specialty Services Required     Referred to Provider:   Yaz Parker DPM     Requested Specialty:   Podiatry     Number of Visits Requested:   1    POCT glycosylated hemoglobin (Hb A1C)     DIABETES FOOT EXAM     Orders Placed This Encounter   Medications    dapagliflozin (FARXIGA) 5 MG tablet     Sig: Take 1 tablet by mouth every morning     Dispense:  30 tablet     Refill:  2    blood glucose monitor kit and supplies     Si kit by Other route 2 times daily Test 4 times a day & as needed for symptoms of irregular blood glucose. Dispense:  1 kit     Refill:  0    Lancets MISC     Sig: Monitor blood sugar 4 times daily and as needed. Dispense:  100 each     Refill:  3    blood glucose monitor strips     Sig: Test 4 times a day & as needed for symptoms of irregular blood glucose. Dispense sufficient amount for indicated testing frequency plus additional to accommodate PRN testing needs. Dispense:  100 strip     Refill:  0     Brand per patient preference. May round up to next available package size. Start taking Farxiga 5 mg daily, take insulin as prescribed and monitor blood sugars. Instructed to keep log for review during follow-up appointment. Continue current medication. Recheck in 3 months, sooner should new symptoms or problems arise. Discussed use, benefit, and side effects of prescribed medications. All patient questions answered. Pt voiced understanding. Health Maintenance reviewed.  Instructed to continue current medications, diet and exercise. Patient agreed with treatment plan. Follow up as directed. Time spent with patient was 52 minutes. More than 50% was spent counseling/coordinating the patient's care.      Electronically signed by ROBSON Dunn CNP on 8/28/2021

## 2021-08-26 DIAGNOSIS — E11.65 TYPE 2 DIABETES MELLITUS WITH HYPERGLYCEMIA (HCC): ICD-10-CM

## 2021-08-26 DIAGNOSIS — E11.65 TYPE 2 DIABETES MELLITUS WITH HYPERGLYCEMIA, WITH LONG-TERM CURRENT USE OF INSULIN (HCC): ICD-10-CM

## 2021-08-26 DIAGNOSIS — Z79.4 TYPE 2 DIABETES MELLITUS WITH HYPERGLYCEMIA, WITH LONG-TERM CURRENT USE OF INSULIN (HCC): ICD-10-CM

## 2021-08-27 NOTE — TELEPHONE ENCOUNTER
Patel Stanley is calling to request a refill on the following medication(s):  Requested Prescriptions     Pending Prescriptions Disp Refills    insulin lispro, 1 Unit Dial, 100 UNIT/ML SOPN [Pharmacy Med Name: INSULIN LISPRO 100 UNIT/ML PEN] 39 mL 2     Sig: INJECT SUBCUTANEOUSLY BEFORE MEALS PER SLIDING SCALE. ..5 UNITS FOR EVERY 50 ABOVE 150. Marnell Kyrie Marnell Kyrie MAX  UNITS DAILY    TRESIBA FLEXTOUCH 200 UNIT/ML SOPN [Pharmacy Med Name: Gely Lisa 200 UNIT/ML] 105 mL      Sig: inject 70 units subcutaneously at bedtime       Last Visit Date (If Applicable):  2/31/8624    Next Visit Date:    11/23/2021

## 2021-08-28 ASSESSMENT — ENCOUNTER SYMPTOMS: EYES NEGATIVE: 1

## 2021-08-29 RX ORDER — INSULIN DEGLUDEC 200 U/ML
INJECTION, SOLUTION SUBCUTANEOUS
Qty: 105 ML | Refills: 2 | Status: SHIPPED | OUTPATIENT
Start: 2021-08-29 | End: 2022-05-23 | Stop reason: SDUPTHER

## 2021-08-29 RX ORDER — INSULIN LISPRO 100 [IU]/ML
INJECTION, SOLUTION INTRAVENOUS; SUBCUTANEOUS
Qty: 39 ML | Refills: 2 | Status: SHIPPED | OUTPATIENT
Start: 2021-08-29 | End: 2022-02-08

## 2021-09-01 DIAGNOSIS — K21.9 GASTROESOPHAGEAL REFLUX DISEASE: ICD-10-CM

## 2021-09-01 RX ORDER — PANTOPRAZOLE SODIUM 40 MG/1
TABLET, DELAYED RELEASE ORAL
Qty: 30 TABLET | Refills: 5 | Status: SHIPPED | OUTPATIENT
Start: 2021-09-01 | End: 2022-05-23 | Stop reason: ALTCHOICE

## 2021-09-01 NOTE — TELEPHONE ENCOUNTER
Kenn Caballero is calling to request a refill on the following medication(s):  Requested Prescriptions     Pending Prescriptions Disp Refills    pantoprazole (PROTONIX) 40 MG tablet [Pharmacy Med Name: PANTOPRAZOLE SOD DR 40 MG TAB] 30 tablet 5     Sig: take 1 tablet by mouth once daily       Last Visit Date (If Applicable):  4/59/6175    Next Visit Date:    11/23/2021

## 2021-09-17 ENCOUNTER — OFFICE VISIT (OUTPATIENT)
Dept: ORTHOPEDIC SURGERY | Age: 26
End: 2021-09-17
Payer: MEDICAID

## 2021-09-17 VITALS
BODY MASS INDEX: 44.77 KG/M2 | SYSTOLIC BLOOD PRESSURE: 122 MMHG | DIASTOLIC BLOOD PRESSURE: 88 MMHG | WEIGHT: 315 LBS | HEART RATE: 120 BPM | OXYGEN SATURATION: 97 %

## 2021-09-17 DIAGNOSIS — M25.312 INSTABILITY OF LEFT SHOULDER JOINT: Primary | ICD-10-CM

## 2021-09-17 PROCEDURE — G8417 CALC BMI ABV UP PARAM F/U: HCPCS | Performed by: ORTHOPAEDIC SURGERY

## 2021-09-17 PROCEDURE — 1036F TOBACCO NON-USER: CPT | Performed by: ORTHOPAEDIC SURGERY

## 2021-09-17 PROCEDURE — 99213 OFFICE O/P EST LOW 20 MIN: CPT

## 2021-09-17 PROCEDURE — G8427 DOCREV CUR MEDS BY ELIG CLIN: HCPCS | Performed by: ORTHOPAEDIC SURGERY

## 2021-09-17 PROCEDURE — 99203 OFFICE O/P NEW LOW 30 MIN: CPT | Performed by: ORTHOPAEDIC SURGERY

## 2021-09-17 NOTE — LETTER
9/17/2021    Harvinder Persaud, APRN - CNP  [address],  [address] [place] [address]    RE: Verdis Heal    Dear Dr. Bessy Schwarz,    Thank you for allowing me to participate in the care of Mr. Alonso Shah. I had the opportunity to evaluate the patient on 9/17/2021. Attached you will find my evaluation and recommendations. Thanks again for the confidence you have expressed in me by allowing my participation in the care of your patient. I will keep you apprised of further developments in the patients treatment course as it progresses. If I can be of further assistance in any fashion, please feel free to contact me at your convenience.     Sincerely,         Aleena Girard  Shoulder and Elbow Surgery

## 2021-09-17 NOTE — PROGRESS NOTES
Orthopedic Shoulder Encounter Note     Chief complaint: left shoulder pain    HPI: Petra Mosley is a 31 y.o.  right-hand dominant male who presents for evaluation of his left shoulder. Indicates that he has been dealing with popping in the shoulder. This is associated with some pain intermittently over the anterior aspect of the shoulder. He states that he feels like the shoulder slipping in and out of joint. It usually occurs with his arm in the mid range position. He denies having any associated stiffness or weakness. He was seen previously by Dr. Ted Orozco for this issue and back in 2018 had surgery to address it. He states that the surgery helped for about 6 months to a year but then it is gone back to its baseline. He denies any precipitating trauma or injury but does recall shoulder dislocation on the right side as a teenager. Previous treatment:    NSAIDs: None    Physical Therapy: Yes, postoperatively    Injections: He recalls having a cortisone injection to the left shoulder sometime prior to surgery    Surgeries: Left shoulder arthroscopy with open anterior capsular shift performed by Dr. Ted Orozco on 6/21/2018    Review of Systems:   Constitutional: Negative for fever, chills, sweats. Pain Score:   3  Neurological: Negative for headache, numbness, or weakness. Musculoskeletal: As noted in HPI     Past Medical History  Delia Roach  has a past medical history of Essential hypertension, GERD (gastroesophageal reflux disease), Hyperlipidemia, and Hypothyroidism. Past Surgical History  Delia Roach  has a past surgical history that includes Appendectomy (2013); Arm Surgery (Left, 2006); Upper gastrointestinal endoscopy (2014); Upper gastrointestinal endoscopy (05/03/2016); Shoulder arthroscopy (Left, 06/21/2018); and pr office/outpt visit,procedure only (Left, 6/21/2018).     Current Medications  Current Outpatient Medications   Medication Sig Dispense Refill    pantoprazole (PROTONIX) 40 MG tablet take 1 tablet by mouth once daily 30 tablet 5    insulin lispro, 1 Unit Dial, 100 UNIT/ML SOPN INJECT SUBCUTANEOUSLY BEFORE MEALS PER SLIDING SCALE. ..5 UNITS FOR EVERY 50 ABOVE 150. Miquel Ra Miquel Ra MAX  UNITS DAILY 39 mL 2    TRESIBA FLEXTOUCH 200 UNIT/ML SOPN inject 70 units subcutaneously at bedtime 105 mL 2    dapagliflozin (FARXIGA) 5 MG tablet Take 1 tablet by mouth every morning 30 tablet 2    blood glucose monitor kit and supplies 1 kit by Other route 2 times daily Test 4 times a day & as needed for symptoms of irregular blood glucose. 1 kit 0    Lancets MISC Monitor blood sugar 4 times daily and as needed. 100 each 3    blood glucose monitor strips Test 4 times a day & as needed for symptoms of irregular blood glucose. Dispense sufficient amount for indicated testing frequency plus additional to accommodate PRN testing needs. 100 strip 0    lisinopril-hydroCHLOROthiazide (PRINZIDE;ZESTORETIC) 20-25 MG per tablet take 1 tablet by mouth once daily 30 tablet 0    atorvastatin (LIPITOR) 80 MG tablet take 1 tablet by mouth nightly 30 tablet 0    metFORMIN (GLUCOPHAGE-XR) 500 MG extended release tablet take 2 tablets by mouth twice a day with meals 120 tablet 0    Lancets (ONETOUCH DELICA PLUS [id number]) MISC USE AS DIRECTED FOUR TIMES A  each 3    blood glucose monitor strips 1 strip by Other route 4 times daily Please dispense test strips compatible with the patient's glucometer True Metrix. 100 strip 3    Insulin Pen Needle 32G X 4 MM MISC 1 each by Does not apply route daily 100 each 3    glucose monitoring kit (FREESTYLE) monitoring kit 1 kit by Does not apply route daily 1 kit 0    acetaminophen 650 MG TABS Take 650 mg by mouth every 4 hours as needed 120 tablet 0     No current facility-administered medications for this visit. Allergies  Allergies have been reviewed. Remington Warren is allergic to cephalexin and sulfa antibiotics.     Social History  Remington Warren  reports that he has never smoked. He has quit using smokeless tobacco. He reports current alcohol use. He reports that he does not use drugs. Family History  Jaya's family history includes Diabetes in his maternal grandfather; Heart Attack in his maternal grandfather, maternal grandmother, and mother; High Blood Pressure in his father and mother. Physical Exam:     /88   Pulse 120   Wt (!) 321 lb (145.6 kg)   SpO2 97%   BMI 44.77 kg/m²    Constitutional: Patient is oriented to person, place, and time. Patient appears well-developed and well nourished. Mental Status/psychiatric: Behavior is normal. Thought content normal.  HENT: Negative otherwise noted  Head: Normocephalic and Atraumatic  Nose: Normal  Respiratory/Cardio: Effort normal. No respiratory distress. Gait: normal    Shoulder:    Skin: Skin is warm and dry; no swelling or obvious muscular atrophy.   Anterior oblique incisional scar over the left shoulder  Vasculature: 2+ radial pulses bilaterally  Neuro: Sensation grossly intact to light touch diffusely  Tenderness: Nontender to palpation    ROM: (Degrees)    Right   A P   Left   A P    Elevation  150    Elevation  150 150  Abduction  160    Abduction  160  160  ER   50    ER   50 60  IR   T12    IR   L3   90 abd/ER      90 abd/ER     90 abd/IR      90 abd/IR     Crepitation  No    Crepitation No  Dyskenesia  No    Dyskenesia No      Muscle strength:    Right       Left    Deltoid   5    Deltoid   5  Supraspinatus  5    Supraspinatus  5  ER   5    ER   5  IR   5    IR   5    Special tests    Right   Rotator Cuff    Left    n   Painful arc    n   n   Pain with ER    n    n   Neer's     y    n   Hawkin's    n    n   Drop Arm    n  n   Lift off/Belly Press   n  n   ER Lag    n          AC Joint  n   AC tenderness   n  n   Cross-chest adduction  n       Labrum/biceps    n   [place]'s    n   n   Biceps sheer    n      n   Speed's/Yergason's   n    n   Tenderness Biceps

## 2022-01-18 ENCOUNTER — VIRTUAL VISIT (OUTPATIENT)
Dept: FAMILY MEDICINE CLINIC | Age: 27
End: 2022-01-18
Payer: COMMERCIAL

## 2022-01-18 DIAGNOSIS — Z79.4 TYPE 2 DIABETES MELLITUS WITH HYPERGLYCEMIA, WITH LONG-TERM CURRENT USE OF INSULIN (HCC): Primary | ICD-10-CM

## 2022-01-18 DIAGNOSIS — E11.65 TYPE 2 DIABETES MELLITUS WITH HYPERGLYCEMIA, WITH LONG-TERM CURRENT USE OF INSULIN (HCC): Primary | ICD-10-CM

## 2022-01-18 DIAGNOSIS — K21.9 GASTROESOPHAGEAL REFLUX DISEASE, UNSPECIFIED WHETHER ESOPHAGITIS PRESENT: ICD-10-CM

## 2022-01-18 DIAGNOSIS — E03.9 HYPOTHYROIDISM, UNSPECIFIED TYPE: ICD-10-CM

## 2022-01-18 DIAGNOSIS — E66.01 MORBID OBESITY DUE TO EXCESS CALORIES (HCC): ICD-10-CM

## 2022-01-18 DIAGNOSIS — I10 ESSENTIAL HYPERTENSION: ICD-10-CM

## 2022-01-18 DIAGNOSIS — E78.2 MIXED HYPERLIPIDEMIA: ICD-10-CM

## 2022-01-18 DIAGNOSIS — G47.33 OBSTRUCTIVE SLEEP APNEA: ICD-10-CM

## 2022-01-18 PROCEDURE — 99214 OFFICE O/P EST MOD 30 MIN: CPT | Performed by: NURSE PRACTITIONER

## 2022-01-18 PROCEDURE — 2022F DILAT RTA XM EVC RTNOPTHY: CPT | Performed by: NURSE PRACTITIONER

## 2022-01-18 PROCEDURE — G8427 DOCREV CUR MEDS BY ELIG CLIN: HCPCS | Performed by: NURSE PRACTITIONER

## 2022-01-18 PROCEDURE — 3046F HEMOGLOBIN A1C LEVEL >9.0%: CPT | Performed by: NURSE PRACTITIONER

## 2022-01-18 NOTE — PROGRESS NOTES
2022    TELEHEALTH EVALUATION -- Audio/Visual (During ODJHT-99 public health emergency)    HPI:   Teo Luna (:  1995) has requested an audio/video evaluation for the following concern(s):    Chief Complaint   Patient presents with    Follow-up     reports recently diagnosed with covid denies any issues but does report is having some diarrhea issues     Gastroesophageal Reflux    Hypertension    Hyperlipidemia    Diabetes     bs ck twice a day ranges are in the 200's states have been elevated since not being active or moving around due to covid     Diagnosed with COVID last week, still has cough and diarrhea. Blood sugars have been running over 200. Patient states his insurance changed and he has not had the insulin. Hypertension, diabetes, and hyperlipidemia  He indicates that he is feeling well and denies any symptoms referable to his elevated blood pressure or diabetes. Specifically denies chest pain, palpitations, dyspnea, peripheral edema, thirst, frequent urination, and blurred vision. Current medication regimen is as listed below. He denies any side effects of medication, and has not been compliant with medication. Last eye exam: overdue.   Diabetic complications include: none    Treatment Adherence:   Medication compliance:  compliant most of the time  Diet compliance:  noncompliant  Weight trend: stable  Current exercise: no regular exercise  Barriers: lack of motivation    []  Hemoglobin A1c has been completed in the last 3-6 months  [x]  Previously ordered but not completed     []  Urine MicroAlbumin  completed and reviewed within the last 12 month  [x]  Previously ordered but not completed     []  Annual eye exam has been completed referring that patient does or does not have diabetic retinopathy  [x]  Recommendation to schedule and/or referral completed if required    BP Readings from Last 3 Encounters:   21 122/88   21 128/84   20 112/80        Pulse CNP   insulin lispro, 1 Unit Dial, 100 UNIT/ML SOPN INJECT SUBCUTANEOUSLY BEFORE MEALS PER SLIDING SCALE. ..5 UNITS FOR EVERY 50 ABOVE 150. Thresa Abed Thresa Abed MAX  UNITS DAILY Yes ROBSON Gutierrez - CNP   TRESIBA FLEXTOUCH 200 UNIT/ML SOPN inject 70 units subcutaneously at bedtime Yes ROBSON Gutierrez - CNP   dapagliflozin (FARXIGA) 5 MG tablet Take 1 tablet by mouth every morning Yes ROBSON Gutierrez - CNP   blood glucose monitor kit and supplies 1 kit by Other route 2 times daily Test 4 times a day & as needed for symptoms of irregular blood glucose. Yes ROBSON Gutierrez - CNP   Lancets MISC Monitor blood sugar 4 times daily and as needed. Yes ROBSON Gutierrez - CNP   blood glucose monitor strips Test 4 times a day & as needed for symptoms of irregular blood glucose. Dispense sufficient amount for indicated testing frequency plus additional to accommodate PRN testing needs. Yes Heather Enriquez APRN - CNP   Lancets [facility] [id number]) [id number] [facility] USE AS DIRECTED FOUR TIMES A DAY Yes ROBSON Gutierrez - CNP   blood glucose monitor strips 1 strip by Other route 4 times daily Please dispense test strips compatible with the patient's glucometer True Metrix.  Yes ROBSON Gutierrez - CNP   Insulin Pen Needle 32G X 4 MM MISC 1 each by Does not apply route daily Yes ROBSON Gutierrez - CNP   glucose monitoring kit (FREESTYLE) monitoring kit 1 kit by Does not apply route daily Yes Eliot Martinez MD   acetaminophen 650 MG TABS Take 650 mg by mouth every 4 hours as needed Yes Asa Lopez   pantoprazole (PROTONIX) 40 MG tablet take 1 tablet by mouth once daily  Heather Enriquez APRN - CNP       Health Maintenance   Topic Date Due    Hepatitis C screen  Never done    Varicella vaccine (1 of 2 - 2-dose childhood series) Never done    COVID-19 Vaccine (1) Never done    Hepatitis B vaccine (1 of 3 - Risk 3-dose series) Never done    Diabetic microalbuminuria test  07/16/2021    Lipid screen  07/16/2021    TSH testing  07/16/2021    Potassium monitoring  07/16/2021    Creatinine monitoring  07/16/2021    Diabetic retinal exam  08/12/2021    Flu vaccine (1) 09/01/2021    HIV screen  12/14/2027 (Originally 10/7/2009)    Diabetic foot exam  08/23/2022    A1C test (Diabetic or Prediabetic)  08/23/2022    Depression Screen  08/23/2022    DTaP/Tdap/Td vaccine (3 - Td or Tdap) 05/10/2029    Pneumococcal 0-64 years Vaccine (2 of 2 - PPSV23) 10/07/2059    Hepatitis A vaccine  Aged Out    Hib vaccine  Aged Out    Meningococcal (ACWY) vaccine  Aged Out        REVIEW OF SYMPTOMS:     Review of Systems   Constitutional: Negative for appetite change, chills, fatigue and fever. HENT: Negative. Eyes: Negative. Respiratory: Positive for cough. Negative for shortness of breath and wheezing. Cardiovascular: Negative for chest pain, palpitations and leg swelling. Gastrointestinal: Positive for diarrhea. Negative for abdominal pain and constipation. Endocrine: Negative for cold intolerance, heat intolerance, polydipsia, polyphagia and polyuria. Genitourinary: Negative. Musculoskeletal: Negative for arthralgias and myalgias. Neurological: Negative for dizziness, weakness, light-headedness and headaches. Psychiatric/Behavioral: Negative for agitation, dysphoric mood, self-injury, sleep disturbance and suicidal ideas. The patient is not nervous/anxious.         Patient-Reported Vitals 11/24/2020   Patient-Reported Weight 302         PHYSICAL EXAM:     [ INSTRUCTIONS:  \"[x]\" Indicates a positive item  \"[]\" Indicates a negative item  -- DELETE ALL ITEMS NOT EXAMINED]    Constitutional:   [x] Appears well-developed and well-nourished [x] No apparent distress    [] Abnormal-     Mental status:  [x] Alert and awake  [x] Oriented to person/place/time [x]Able to follow commands      Eyes:  EOM    []  Normal  [] Abnormal-  Sclera  [x]  Normal  [] Abnormal -         Discharge [x]  None visible  [] Abnormal -    HENT: [x] Normocephalic, atraumatic. [] Abnormal   [x] Mouth/Throat: Mucous membranes are moist.     External Ears:  [x] Normal  [] Abnormal-     Neck:  [x] No visualized mass     Pulmonary/Chest:   [x] Respiratory effort normal.  [x] No visualized signs of difficulty breathing or respiratory distress  [] Abnormal-      Musculoskeletal:    [] Normal gait with no signs of ataxia. [x] Normal range of motion of neck  [] Abnormal-     Neurological:      [x] No Facial Asymmetry (Cranial nerve 7 motor function) (limited exam to video visit)          [] No gaze palsy        [] Abnormal-         Skin:        [x] No significant exanthematous lesions or discoloration noted on facial skin         [] Abnormal-            Psychiatric:       [x] Normal Affect [x] No Hallucinations        [] Abnormal-     Other pertinent observable physical exam findings: none. PHQ Scores 8/23/2021 6/15/2020 4/22/2019 4/5/2018   PHQ2 Score 0 0 0 0   PHQ9 Score 0 0 0 0     Interpretation of Total Score Depression Severity: 1-4 = Minimal depression, 5-9 = Mild depression, 10-14 = Moderate depression, 15-19 = Moderately severe depression, 20-27 = Severe depression     PLAN:     1. Type 2 diabetes mellitus with hyperglycemia, with long-term current use of insulin (HCC)  Assessment & Plan:   Unclear control, continue current medications, medication adherence emphasized and lifestyle modifications recommended. Orders:  -     [address], NP, Diabetes Management, [place]  -     Hemoglobin A1C; Future  2. Morbid obesity due to excess calories [facility])  Assessment & Plan:   Uncontrolled, lifestyle modifications recommended. Orders:  -     [address], NP, Diabetes Management, [place]  3. Mixed hyperlipidemia  Assessment & Plan:   Unclear control, continue current medications and lipid ordered today. 4. Hypothyroidism, unspecified type  Assessment & Plan:  TSH previously ordered but not completed. Encouraged to complete labs.   5. Gastroesophageal reflux disease, unspecified whether esophagitis present  Assessment & Plan:   Well-controlled, continue current medications  6. Obstructive sleep apnea  Assessment & Plan:   Unclear control, Patient previously referred to pulmonologist.  7. Essential hypertension  Assessment & Plan:   Well-controlled, continue current medications    Return in about 3 months (around 4/18/2022) for DM, HTN, HLD. Oli Saavedra is a 31 y.o. male being evaluated by a Virtual Visit (video visit) encounter to address concerns as mentioned above. A caregiver was present when appropriate. Due to this being a TeleHealth encounter (During UZZWF-91 public health emergency), evaluation of the following organ systems was limited: Vitals/Constitutional/EENT/Resp/CV/GI//MS/Neuro/Skin/Heme-Lymph-Imm. Pursuant to the emergency declaration under the [address] [place] [address], [address] [place] [address] authority and the [employer] and [employer] Act, this Virtual Visit was conducted with patient's (and/or legal guardian's) consent, to reduce the patient's risk of exposure to COVID-19 and provide necessary medical care. The patient (and/or legal guardian) has also been advised to contact this office for worsening conditions or problems, and seek emergency medical treatment and/or call 911 if deemed necessary. Services were provided through a video synchronous discussion virtually to substitute for in-person clinic visit. Patient and provider were located at their individual homes. Electronically signed by ROBSON Montalvo CNP on 2/3/2022 at 9:03 PM.     An electronic signature was used to authenticate this note.

## 2022-02-03 ASSESSMENT — ENCOUNTER SYMPTOMS
EYES NEGATIVE: 1
DIARRHEA: 1
ABDOMINAL PAIN: 0
SHORTNESS OF BREATH: 0
COUGH: 1
WHEEZING: 0
CONSTIPATION: 0

## 2022-02-04 NOTE — ASSESSMENT & PLAN NOTE
Unclear control, continue current medications, medication adherence emphasized and lifestyle modifications recommended.

## 2022-02-07 DIAGNOSIS — Z79.4 TYPE 2 DIABETES MELLITUS WITH HYPERGLYCEMIA, WITH LONG-TERM CURRENT USE OF INSULIN (HCC): ICD-10-CM

## 2022-02-07 DIAGNOSIS — E11.65 TYPE 2 DIABETES MELLITUS WITH HYPERGLYCEMIA, WITH LONG-TERM CURRENT USE OF INSULIN (HCC): ICD-10-CM

## 2022-02-08 RX ORDER — INSULIN LISPRO 100 [IU]/ML
INJECTION, SOLUTION INTRAVENOUS; SUBCUTANEOUS
Qty: 39 ML | Refills: 2 | Status: SHIPPED | OUTPATIENT
Start: 2022-02-08 | End: 2022-05-23 | Stop reason: SDUPTHER

## 2022-02-08 NOTE — TELEPHONE ENCOUNTER
Kendall Pineda is requesting a refill on the following medication(s):  Requested Prescriptions     Pending Prescriptions Disp Refills    insulin lispro, 1 Unit Dial, 100 UNIT/ML SOPN [Pharmacy Med Name: INSULIN LISPRO 100 UNIT/ML PEN] 39 mL 2     Sig: INJECT SUBCUTANEOUSLY BEFORE MEALS PER SLIDING SCALE. ..5 UNITS FOR EVERY 50 ABOVE 150. Bam Barrett MAX  UNITS DAILY       Last Visit Date (If Applicable):  8/91/6394    Next Visit Date:    Visit date not found

## 2022-03-09 DIAGNOSIS — E11.65 TYPE 2 DIABETES MELLITUS WITH HYPERGLYCEMIA, WITH LONG-TERM CURRENT USE OF INSULIN (HCC): ICD-10-CM

## 2022-03-09 DIAGNOSIS — I10 ESSENTIAL HYPERTENSION: ICD-10-CM

## 2022-03-09 DIAGNOSIS — Z79.4 TYPE 2 DIABETES MELLITUS WITH HYPERGLYCEMIA, WITH LONG-TERM CURRENT USE OF INSULIN (HCC): ICD-10-CM

## 2022-03-09 RX ORDER — LISINOPRIL AND HYDROCHLOROTHIAZIDE 25; 20 MG/1; MG/1
TABLET ORAL
Qty: 30 TABLET | Refills: 1 | Status: SHIPPED | OUTPATIENT
Start: 2022-03-09 | End: 2022-05-23 | Stop reason: SDUPTHER

## 2022-03-09 RX ORDER — METFORMIN HYDROCHLORIDE 500 MG/1
TABLET, EXTENDED RELEASE ORAL
Qty: 120 TABLET | Refills: 1 | Status: SHIPPED | OUTPATIENT
Start: 2022-03-09 | End: 2022-05-23 | Stop reason: SDUPTHER

## 2022-03-09 NOTE — TELEPHONE ENCOUNTER
Lars Parks is calling to request a refill on the following medication(s):  Requested Prescriptions     Pending Prescriptions Disp Refills    metFORMIN (GLUCOPHAGE-XR) 500 MG extended release tablet [Pharmacy Med Name: METFORMIN HCL  MG TABLET] 120 tablet 0     Sig: take 2 tablets by mouth twice a day with meals    lisinopril-hydroCHLOROthiazide (PRINZIDE;ZESTORETIC) 20-25 MG per tablet [Pharmacy Med Name: LISINOPRIL-HCTZ 20-25 MG TAB] 30 tablet 0     Sig: take 1 tablet by mouth once daily       Last Visit Date (If Applicable):  7/93/7337    Next Visit Date:    Visit date not found

## 2022-03-10 NOTE — TELEPHONE ENCOUNTER
Rx request has been approved for 30 days. Please call patient, no additional refills until labs are completed.

## 2022-04-20 DIAGNOSIS — E11.65 TYPE 2 DIABETES MELLITUS WITH HYPERGLYCEMIA, WITH LONG-TERM CURRENT USE OF INSULIN (HCC): ICD-10-CM

## 2022-04-20 DIAGNOSIS — Z79.4 TYPE 2 DIABETES MELLITUS WITH HYPERGLYCEMIA, WITH LONG-TERM CURRENT USE OF INSULIN (HCC): ICD-10-CM

## 2022-04-20 RX ORDER — DAPAGLIFLOZIN 5 MG/1
TABLET, FILM COATED ORAL
Qty: 30 TABLET | Refills: 2 | Status: SHIPPED | OUTPATIENT
Start: 2022-04-20 | End: 2022-05-23 | Stop reason: SDUPTHER

## 2022-05-12 DIAGNOSIS — E11.65 TYPE 2 DIABETES MELLITUS WITH HYPERGLYCEMIA, WITH LONG-TERM CURRENT USE OF INSULIN (HCC): ICD-10-CM

## 2022-05-12 DIAGNOSIS — Z79.4 TYPE 2 DIABETES MELLITUS WITH HYPERGLYCEMIA, WITH LONG-TERM CURRENT USE OF INSULIN (HCC): ICD-10-CM

## 2022-05-12 DIAGNOSIS — I10 ESSENTIAL HYPERTENSION: ICD-10-CM

## 2022-05-12 NOTE — TELEPHONE ENCOUNTER
[facility] is requesting a refill on the following medication(s):  Requested Prescriptions     Pending Prescriptions Disp Refills    metFORMIN (GLUCOPHAGE-XR) 500 MG extended release tablet [Pharmacy Med Name: METFORMIN HCL  MG TABLET] 120 tablet 1     Sig: take 2 tablets by mouth twice a day with meals    lisinopril-hydroCHLOROthiazide (PRINZIDE;ZESTORETIC) 20-25 MG per tablet [Pharmacy Med Name: LISINOPRIL-HCTZ 20-25 MG TAB] 30 tablet 1     Sig: take 1 tablet by mouth once daily       Last Visit Date (If Applicable):  9/55/0448    Next Visit Date:    Visit date not found

## 2022-05-13 RX ORDER — LISINOPRIL AND HYDROCHLOROTHIAZIDE 25; 20 MG/1; MG/1
TABLET ORAL
Qty: 30 TABLET | Refills: 1 | OUTPATIENT
Start: 2022-05-13

## 2022-05-13 RX ORDER — METFORMIN HYDROCHLORIDE 500 MG/1
TABLET, EXTENDED RELEASE ORAL
Qty: 120 TABLET | Refills: 1 | OUTPATIENT
Start: 2022-05-13

## 2022-05-23 ENCOUNTER — OFFICE VISIT (OUTPATIENT)
Dept: FAMILY MEDICINE CLINIC | Age: 27
End: 2022-05-23
Payer: COMMERCIAL

## 2022-05-23 VITALS
WEIGHT: 312.2 LBS | OXYGEN SATURATION: 97 % | HEART RATE: 109 BPM | SYSTOLIC BLOOD PRESSURE: 122 MMHG | BODY MASS INDEX: 43.54 KG/M2 | DIASTOLIC BLOOD PRESSURE: 80 MMHG

## 2022-05-23 DIAGNOSIS — Z79.4 TYPE 2 DIABETES MELLITUS WITH HYPERGLYCEMIA, WITH LONG-TERM CURRENT USE OF INSULIN (HCC): Primary | ICD-10-CM

## 2022-05-23 DIAGNOSIS — E03.9 HYPOTHYROIDISM, UNSPECIFIED TYPE: ICD-10-CM

## 2022-05-23 DIAGNOSIS — I10 ESSENTIAL HYPERTENSION: ICD-10-CM

## 2022-05-23 DIAGNOSIS — G47.33 OBSTRUCTIVE SLEEP APNEA: ICD-10-CM

## 2022-05-23 DIAGNOSIS — E78.2 MIXED HYPERLIPIDEMIA: ICD-10-CM

## 2022-05-23 DIAGNOSIS — E66.01 MORBID OBESITY DUE TO EXCESS CALORIES (HCC): ICD-10-CM

## 2022-05-23 DIAGNOSIS — E11.65 TYPE 2 DIABETES MELLITUS WITH HYPERGLYCEMIA, WITH LONG-TERM CURRENT USE OF INSULIN (HCC): Primary | ICD-10-CM

## 2022-05-23 PROBLEM — K21.9 GERD (GASTROESOPHAGEAL REFLUX DISEASE): Status: RESOLVED | Noted: 2019-07-25 | Resolved: 2022-05-23

## 2022-05-23 LAB — HBA1C MFR BLD: 7.6 %

## 2022-05-23 PROCEDURE — G8428 CUR MEDS NOT DOCUMENT: HCPCS | Performed by: NURSE PRACTITIONER

## 2022-05-23 PROCEDURE — PBSHW POCT GLYCOSYLATED HEMOGLOBIN (HGB A1C): Performed by: NURSE PRACTITIONER

## 2022-05-23 PROCEDURE — 99213 OFFICE O/P EST LOW 20 MIN: CPT

## 2022-05-23 PROCEDURE — 3051F HG A1C>EQUAL 7.0%<8.0%: CPT | Performed by: NURSE PRACTITIONER

## 2022-05-23 PROCEDURE — 2022F DILAT RTA XM EVC RTNOPTHY: CPT | Performed by: NURSE PRACTITIONER

## 2022-05-23 PROCEDURE — 83036 HEMOGLOBIN GLYCOSYLATED A1C: CPT | Performed by: NURSE PRACTITIONER

## 2022-05-23 PROCEDURE — 1036F TOBACCO NON-USER: CPT | Performed by: NURSE PRACTITIONER

## 2022-05-23 PROCEDURE — G8417 CALC BMI ABV UP PARAM F/U: HCPCS | Performed by: NURSE PRACTITIONER

## 2022-05-23 PROCEDURE — 99214 OFFICE O/P EST MOD 30 MIN: CPT | Performed by: NURSE PRACTITIONER

## 2022-05-23 RX ORDER — LISINOPRIL AND HYDROCHLOROTHIAZIDE 25; 20 MG/1; MG/1
TABLET ORAL
Qty: 30 TABLET | Refills: 2 | Status: SHIPPED | OUTPATIENT
Start: 2022-05-23

## 2022-05-23 RX ORDER — METFORMIN HYDROCHLORIDE 500 MG/1
TABLET, EXTENDED RELEASE ORAL
Qty: 120 TABLET | Refills: 2 | Status: SHIPPED | OUTPATIENT
Start: 2022-05-23 | End: 2022-09-01

## 2022-05-23 RX ORDER — ATORVASTATIN CALCIUM 80 MG/1
80 TABLET, FILM COATED ORAL NIGHTLY
Qty: 30 TABLET | Refills: 2 | Status: SHIPPED | OUTPATIENT
Start: 2022-05-23

## 2022-05-23 RX ORDER — INSULIN LISPRO 100 [IU]/ML
INJECTION, SOLUTION INTRAVENOUS; SUBCUTANEOUS
Qty: 39 ML | Refills: 2 | Status: SHIPPED | OUTPATIENT
Start: 2022-05-23 | End: 2022-09-19

## 2022-05-23 RX ORDER — INSULIN DEGLUDEC 200 U/ML
INJECTION, SOLUTION SUBCUTANEOUS
Qty: 105 ML | Refills: 2 | Status: SHIPPED | OUTPATIENT
Start: 2022-05-23

## 2022-05-23 ASSESSMENT — ENCOUNTER SYMPTOMS
CONSTIPATION: 0
COUGH: 0
DIARRHEA: 0
EYES NEGATIVE: 1
ABDOMINAL PAIN: 0
SHORTNESS OF BREATH: 0
WHEEZING: 0

## 2022-05-23 ASSESSMENT — PATIENT HEALTH QUESTIONNAIRE - PHQ9
2. FEELING DOWN, DEPRESSED OR HOPELESS: 0
SUM OF ALL RESPONSES TO PHQ QUESTIONS 1-9: 0
1. LITTLE INTEREST OR PLEASURE IN DOING THINGS: 0
SUM OF ALL RESPONSES TO PHQ QUESTIONS 1-9: 0
SUM OF ALL RESPONSES TO PHQ QUESTIONS 1-9: 0
SUM OF ALL RESPONSES TO PHQ9 QUESTIONS 1 & 2: 0
SUM OF ALL RESPONSES TO PHQ QUESTIONS 1-9: 0

## 2022-05-23 NOTE — PROGRESS NOTES
[address] [address] [address]  Dept: [phone]  Dept Fax: [phone]    Patient:  Yvan Brooks  YOB: 1995  Date of Service:  5/23/2022    Chief Complaint   Patient presents with    Diabetes     denies blood sugar checks, numbness or tingling, increased urination or thirst    Hypothyroidism     denies fatigue, intolerance to heat or cold, palpatations    Hypertension     denies chest pains, sob, dizziness, leg edema    Hyperlipidemia    Gastroesophageal Reflux     denies abd pain, nausea or vomiting, trouble swallowing        SUBJECTIVE:   He has not been monitoring the blood sugar regularly. States he sometimes uses the mealtime insulin, but not always. He does not usually check the blood sugar before giving the insulin. He has not completed previously ordered labs. Hypertension, diabetes, and hyperlipidemia:  He indicates that he is feeling well and denies any symptoms referable to his elevated blood pressure or diabetes. Specifically denies chest pain, palpitations, dyspnea, peripheral edema, thirst, frequent urination, and blurred vision. Current medication regimen is as listed below. He denies any side effects of medication, and has been compliant, taking it regularly. Home glucose readings have been n/a. Checks blood sugars n/a.  Last eye exam: 8/1886 Diabetic complications include: none    Treatment Adherence:   Medication compliance:  compliant most of the time  Diet compliance:  compliant most of the time  Weight trend: stable  Current exercise: no regular exercise  Barriers: time constraints    []  Hemoglobin A1c has been completed in the last 3-6 months  [x]  To be ordered today    []  Urine MicroAlbumin  completed and reviewed within the last 12 month  [x]  To be ordered today    [x]  Annual eye exam has been completed referring that patient does or does not have diabetic retinopathy  []  Recommendation to schedule and/or referral completed if required    []  Annual diabetic foot exam has been completed in office in the last 12 months  []  To be completed today    The ASCVD Risk score (Meg Bradford., et al., 2013) failed to calculate for the following reasons: The 2013 ASCVD risk score is only valid for ages 36 to 78     BP Readings from Last 3 Encounters:   05/23/22 122/80   09/17/21 122/88   08/23/21 128/84      Pulse Readings from Last 3 Encounters:   05/23/22 109   09/17/21 120   08/23/21 110      Wt Readings from Last 3 Encounters:   05/23/22 (!) 312 lb 3.2 oz (141.6 kg)   09/17/21 (!) 321 lb (145.6 kg)   08/23/21 (!) 320 lb 12.8 oz (145.5 kg)        Allergies   Allergen Reactions    Cephalexin Hives    Sulfa Antibiotics Hives     Current Outpatient Medications   Medication Sig Dispense Refill    atorvastatin (LIPITOR) 80 MG tablet Take 1 tablet by mouth nightly 30 tablet 2    dapagliflozin (FARXIGA) 5 MG tablet take 1 tablet by mouth every morning 30 tablet 2    insulin lispro, 1 Unit Dial, 100 UNIT/ML SOPN INJECT SUBCUTANEOUSLY BEFORE MEALS PER SLIDING SCALE. ..5 UNITS FOR EVERY 50 ABOVE 150. Paolo Ford MAX  UNITS DAILY 39 mL 2    lisinopril-hydroCHLOROthiazide (PRINZIDE;ZESTORETIC) 20-25 MG per tablet take 1 tablet by mouth once daily 30 tablet 2    metFORMIN (GLUCOPHAGE-XR) 500 MG extended release tablet Take 2 tablets by mouth twice daily with meals. 120 tablet 2    Insulin Degludec (TRESIBA FLEXTOUCH) 200 UNIT/ML SOPN inject 70 units subcutaneously at bedtime 105 mL 2    blood glucose monitor kit and supplies 1 kit by Other route 2 times daily Test 4 times a day & as needed for symptoms of irregular blood glucose. 1 kit 0    Lancets MISC Monitor blood sugar 4 times daily and as needed. 100 each 3    blood glucose monitor strips Test 4 times a day & as needed for symptoms of irregular blood glucose.  Dispense sufficient amount for indicated testing frequency plus additional to accommodate PRN testing Gastrointestinal: Negative for abdominal pain, constipation and diarrhea. Endocrine: Negative for cold intolerance, heat intolerance, polydipsia, polyphagia and polyuria. Genitourinary: Negative. Musculoskeletal: Negative for arthralgias and myalgias. Allergic/Immunologic: Negative for environmental allergies and food allergies. Neurological: Negative for dizziness, weakness, light-headedness and headaches. Psychiatric/Behavioral: Negative for agitation, dysphoric mood, self-injury, sleep disturbance and suicidal ideas. The patient is not nervous/anxious. PHQ Scores 5/23/2022 8/23/2021 6/15/2020 4/22/2019 4/5/2018   PHQ2 Score 0 0 0 0 0   PHQ9 Score 0 0 0 0 0     Interpretation of Total Score Depression Severity: 1-4 = Minimal depression, 5-9 = Mild depression, 10-14 = Moderate depression, 15-19 = Moderately severe depression, 20-27 = Severe depression     PHYSICAL EXAM:     /80   Pulse 109   Wt (!) 312 lb 3.2 oz (141.6 kg)   SpO2 97%   BMI 43.54 kg/m²    Body mass index is 43.54 kg/m². Physical Exam  Constitutional:       Appearance: Normal appearance. He is obese. HENT:      Head: Normocephalic. Eyes:      Conjunctiva/sclera: Conjunctivae normal.   Neck:      Vascular: No carotid bruit. Cardiovascular:      Rate and Rhythm: Normal rate and regular rhythm. Pulmonary:      Effort: Pulmonary effort is normal.      Breath sounds: Normal breath sounds. No wheezing. Abdominal:      Comments: obese   Musculoskeletal:      Cervical back: Neck supple. Right lower leg: No edema. Left lower leg: No edema. Lymphadenopathy:      Cervical: No cervical adenopathy. Neurological:      General: No focal deficit present. Mental Status: He is alert and oriented to person, place, and time.       Gait: Gait normal.   Psychiatric:         Mood and Affect: Mood normal.        Lab Results   Component Value Date    LABA1C 7.6 05/23/2022    LABA1C 8.5 08/23/2021    LABA1C 10.8 06/15/2020     Lab Results   Component Value Date    GLUF 153 (H) 07/16/2020    LABMICR CANNOT BE CALCULATED 07/16/2020    LDLCALC 120.6 07/30/2019    CREATININE 0.80 07/16/2020       ASSESSMENT /PLAN   1. Type 2 diabetes mellitus with hyperglycemia, with long-term current use of insulin (HCC)  -     dapagliflozin (FARXIGA) 5 MG tablet; take 1 tablet by mouth every morning, Disp-30 tablet, R-2Normal  -     insulin lispro, 1 Unit Dial, 100 UNIT/ML SOPN; INJECT SUBCUTANEOUSLY BEFORE MEALS PER SLIDING SCALE. ..5 UNITS FOR EVERY 50 ABOVE 150. Patti Britta Patti Britta MAX  UNITS DAILY, Disp-39 mL, R-2Normal  -     metFORMIN (GLUCOPHAGE-XR) 500 MG extended release tablet; Take 2 tablets by mouth twice daily with meals. , Disp-120 tablet, R-2Normal  2. Essential hypertension  -     lisinopril-hydroCHLOROthiazide (PRINZIDE;ZESTORETIC) 20-25 MG per tablet; take 1 tablet by mouth once daily, Disp-30 tablet, R-2Normal  3. Mixed hyperlipidemia  -     atorvastatin (LIPITOR) 80 MG tablet; Take 1 tablet by mouth nightly, Disp-30 tablet, R-2Normal  4. Obstructive sleep apnea  5. Hypothyroidism, unspecified type  6. Morbid obesity due to excess calories ([address]     Instructed to complete previously ordered labs. FIRM discussion on the dangers of taking insulin without monitoring blood sugar. Instructed to monitor 4 times daily and keep log. Eat a protein snack before bedtime. Also discussed getting a CGM for better compliance. Follow up in 4 weeks with the readings. Return in about 4 weeks (around 6/20/2022) for Diabetes. All patient questions answered. Patient voiced understanding. Health Maintenance reviewed. Instructed to continue current medications. Patient agreed with treatment plan. Follow up as directed. Please note that this chart was generated using voice recognition Dragon dictation software.   Although every effort was made to ensure the accuracy of this automated transcription, some errors in transcription may have occurred.     Electronically signed by ROBSON Mix CNP on 5/23/2022

## 2022-09-01 DIAGNOSIS — E11.65 TYPE 2 DIABETES MELLITUS WITH HYPERGLYCEMIA, WITH LONG-TERM CURRENT USE OF INSULIN (HCC): ICD-10-CM

## 2022-09-01 DIAGNOSIS — Z79.4 TYPE 2 DIABETES MELLITUS WITH HYPERGLYCEMIA, WITH LONG-TERM CURRENT USE OF INSULIN (HCC): ICD-10-CM

## 2022-09-01 RX ORDER — METFORMIN HYDROCHLORIDE 500 MG/1
TABLET, EXTENDED RELEASE ORAL
Qty: 120 TABLET | Refills: 2 | Status: SHIPPED | OUTPATIENT
Start: 2022-09-01

## 2022-09-01 NOTE — TELEPHONE ENCOUNTER
Giovanni Roberts is calling to request a refill on the following medication(s):  Requested Prescriptions     Pending Prescriptions Disp Refills    metFORMIN (GLUCOPHAGE-XR) 500 MG extended release tablet [Pharmacy Med Name: METFORMIN HCL  MG TABLET] 120 tablet 2     Sig: take 2 tablets by mouth twice a day with meals       Last Visit Date (If Applicable):  7/80/8651    Next Visit Date:    Visit date not found

## 2022-09-18 DIAGNOSIS — Z79.4 TYPE 2 DIABETES MELLITUS WITH HYPERGLYCEMIA, WITH LONG-TERM CURRENT USE OF INSULIN (HCC): ICD-10-CM

## 2022-09-18 DIAGNOSIS — E11.65 TYPE 2 DIABETES MELLITUS WITH HYPERGLYCEMIA, WITH LONG-TERM CURRENT USE OF INSULIN (HCC): ICD-10-CM

## 2022-09-19 NOTE — TELEPHONE ENCOUNTER
Barry Youssef is requesting a refill on the following medication(s):  Requested Prescriptions     Pending Prescriptions Disp Refills    insulin lispro, 1 Unit Dial, 100 UNIT/ML SOPN [Pharmacy Med Name: INSULIN LISPRO 100 UNIT/ML PEN] 39 mL 2     Sig: INJECT SUBCUTANEOUSLY BEFORE MEALS PER SLIDING SCALE (5 UNITS FOR EVERY 50 ABOVE 150).  MAX  UNITS PER DAY,       Last Visit Date (If Applicable):  0/54/0647    Next Visit Date:    Visit date not found

## 2022-09-23 RX ORDER — INSULIN LISPRO 100 [IU]/ML
INJECTION, SOLUTION INTRAVENOUS; SUBCUTANEOUS
Qty: 39 ML | Refills: 0 | Status: SHIPPED | OUTPATIENT
Start: 2022-09-23

## 2022-09-23 NOTE — TELEPHONE ENCOUNTER
Please notify patient follow-up is needed. A1c still pending and waiting for completion. No further refills until follow-up.

## 2022-10-30 DIAGNOSIS — E11.65 TYPE 2 DIABETES MELLITUS WITH HYPERGLYCEMIA, WITH LONG-TERM CURRENT USE OF INSULIN (HCC): ICD-10-CM

## 2022-10-30 DIAGNOSIS — Z79.4 TYPE 2 DIABETES MELLITUS WITH HYPERGLYCEMIA, WITH LONG-TERM CURRENT USE OF INSULIN (HCC): ICD-10-CM

## 2022-10-31 ENCOUNTER — NURSE TRIAGE (OUTPATIENT)
Dept: OTHER | Facility: CLINIC | Age: 27
End: 2022-10-31

## 2022-10-31 RX ORDER — INSULIN LISPRO 100 [IU]/ML
INJECTION, SOLUTION INTRAVENOUS; SUBCUTANEOUS
Qty: 39 ML | Refills: 0 | OUTPATIENT
Start: 2022-10-31

## 2022-10-31 NOTE — TELEPHONE ENCOUNTER
Location of patient: [address] [place] [address] triage as caller is not with patient    [facility] Subjective: Caller states \"he wants a new doctor\"     Current Symptoms: He takes [place], metformin and lispro. Doesn't feel his PCP has been managing his sugars. He had an episode yesterday evening and it  350, he took 20 units of insulin as directed. He checked sugar last night and it was 325, took his long acting insulin and 10 units of his short acting and several hours later he woke up feeling 'like he was going to die' and his sugar was 50. His average sugar readings are 120-150. He is at work and currently feeling fine, no complaints, just wants a new doctor. Onset: chronic    Associated Symptoms: NA    Pain Severity: 0/10; N/A; none    Temperature: no fever     What has been tried: insulin as directed     LMP: NA Pregnant: NA    Recommended disposition: Home Care    Care advice provided, patient verbalizes understanding; denies any other questions or concerns; instructed to call back for any new or worsening symptoms. They want an appointment with a new PCP at [facility] warm transfer to Ilana Levine at [facility]    Attention Provider: Thank you for allowing me to participate in the care of your patient. The patient was connected to triage in response to information provided to the ECC/PSC. Please do not respond through this encounter as the response is not directed to a shared pool.         Reason for Disposition   Blood glucose > 300 mg/dL (16.7 mmol/L)    Protocols used: Diabetes - High Blood Sugar-ADULT-[place]

## 2022-11-15 ENCOUNTER — OFFICE VISIT (OUTPATIENT)
Dept: PRIMARY CARE CLINIC | Age: 27
End: 2022-11-15
Payer: COMMERCIAL

## 2022-11-15 VITALS
RESPIRATION RATE: 16 BRPM | OXYGEN SATURATION: 97 % | BODY MASS INDEX: 42.61 KG/M2 | HEART RATE: 104 BPM | DIASTOLIC BLOOD PRESSURE: 82 MMHG | WEIGHT: 304.4 LBS | HEIGHT: 71 IN | SYSTOLIC BLOOD PRESSURE: 130 MMHG

## 2022-11-15 DIAGNOSIS — Z23 NEED FOR INFLUENZA VACCINATION: ICD-10-CM

## 2022-11-15 DIAGNOSIS — E03.9 HYPOTHYROIDISM, UNSPECIFIED TYPE: ICD-10-CM

## 2022-11-15 DIAGNOSIS — E11.65 TYPE 2 DIABETES MELLITUS WITH HYPERGLYCEMIA, WITH LONG-TERM CURRENT USE OF INSULIN (HCC): Primary | ICD-10-CM

## 2022-11-15 DIAGNOSIS — R68.82 LOW LIBIDO: ICD-10-CM

## 2022-11-15 DIAGNOSIS — Z79.4 TYPE 2 DIABETES MELLITUS WITH HYPERGLYCEMIA, WITH LONG-TERM CURRENT USE OF INSULIN (HCC): Primary | ICD-10-CM

## 2022-11-15 DIAGNOSIS — E66.01 MORBID OBESITY DUE TO EXCESS CALORIES (HCC): ICD-10-CM

## 2022-11-15 DIAGNOSIS — I10 ESSENTIAL HYPERTENSION: ICD-10-CM

## 2022-11-15 LAB — HBA1C MFR BLD: 8.4 %

## 2022-11-15 PROCEDURE — 2022F DILAT RTA XM EVC RTNOPTHY: CPT | Performed by: PHYSICIAN ASSISTANT

## 2022-11-15 PROCEDURE — 3052F HG A1C>EQUAL 8.0%<EQUAL 9.0%: CPT | Performed by: PHYSICIAN ASSISTANT

## 2022-11-15 PROCEDURE — 3078F DIAST BP <80 MM HG: CPT | Performed by: PHYSICIAN ASSISTANT

## 2022-11-15 PROCEDURE — 99215 OFFICE O/P EST HI 40 MIN: CPT | Performed by: PHYSICIAN ASSISTANT

## 2022-11-15 PROCEDURE — G8417 CALC BMI ABV UP PARAM F/U: HCPCS | Performed by: PHYSICIAN ASSISTANT

## 2022-11-15 PROCEDURE — 1036F TOBACCO NON-USER: CPT | Performed by: PHYSICIAN ASSISTANT

## 2022-11-15 PROCEDURE — G8427 DOCREV CUR MEDS BY ELIG CLIN: HCPCS | Performed by: PHYSICIAN ASSISTANT

## 2022-11-15 PROCEDURE — 3074F SYST BP LT 130 MM HG: CPT | Performed by: PHYSICIAN ASSISTANT

## 2022-11-15 PROCEDURE — G8482 FLU IMMUNIZE ORDER/ADMIN: HCPCS | Performed by: PHYSICIAN ASSISTANT

## 2022-11-15 PROCEDURE — 83036 HEMOGLOBIN GLYCOSYLATED A1C: CPT | Performed by: PHYSICIAN ASSISTANT

## 2022-11-15 PROCEDURE — 90674 CCIIV4 VAC NO PRSV 0.5 ML IM: CPT | Performed by: PHYSICIAN ASSISTANT

## 2022-11-15 RX ORDER — LISINOPRIL 10 MG/1
10 TABLET ORAL DAILY
Qty: 90 TABLET | Refills: 0 | Status: SHIPPED | OUTPATIENT
Start: 2022-11-15

## 2022-11-15 RX ORDER — BLOOD-GLUCOSE TRANSMITTER
EACH MISCELLANEOUS
Qty: 1 EACH | Refills: 0 | Status: SHIPPED | OUTPATIENT
Start: 2022-11-15

## 2022-11-15 RX ORDER — BLOOD-GLUCOSE,RECEIVER,CONT
EACH MISCELLANEOUS
Qty: 1 EACH | Refills: 0 | Status: SHIPPED | OUTPATIENT
Start: 2022-11-15

## 2022-11-15 RX ORDER — BLOOD-GLUCOSE SENSOR
EACH MISCELLANEOUS
Qty: 3 EACH | Refills: 2 | Status: SHIPPED | OUTPATIENT
Start: 2022-11-15

## 2022-11-15 SDOH — ECONOMIC STABILITY: TRANSPORTATION INSECURITY
IN THE PAST 12 MONTHS, HAS LACK OF TRANSPORTATION KEPT YOU FROM MEETINGS, WORK, OR FROM GETTING THINGS NEEDED FOR DAILY LIVING?: NO

## 2022-11-15 SDOH — ECONOMIC STABILITY: FOOD INSECURITY: WITHIN THE PAST 12 MONTHS, YOU WORRIED THAT YOUR FOOD WOULD RUN OUT BEFORE YOU GOT MONEY TO BUY MORE.: NEVER TRUE

## 2022-11-15 SDOH — ECONOMIC STABILITY: TRANSPORTATION INSECURITY
IN THE PAST 12 MONTHS, HAS THE LACK OF TRANSPORTATION KEPT YOU FROM MEDICAL APPOINTMENTS OR FROM GETTING MEDICATIONS?: NO

## 2022-11-15 SDOH — ECONOMIC STABILITY: FOOD INSECURITY: WITHIN THE PAST 12 MONTHS, THE FOOD YOU BOUGHT JUST DIDN'T LAST AND YOU DIDN'T HAVE MONEY TO GET MORE.: NEVER TRUE

## 2022-11-15 ASSESSMENT — ENCOUNTER SYMPTOMS
ABDOMINAL PAIN: 0
EYE PAIN: 0
BACK PAIN: 0
SHORTNESS OF BREATH: 0
SINUS PAIN: 0
RHINORRHEA: 0
CONSTIPATION: 0
NAUSEA: 0
COUGH: 0
VOMITING: 0
DIARRHEA: 0

## 2022-11-15 ASSESSMENT — PATIENT HEALTH QUESTIONNAIRE - PHQ9
SUM OF ALL RESPONSES TO PHQ QUESTIONS 1-9: 0
1. LITTLE INTEREST OR PLEASURE IN DOING THINGS: 0
2. FEELING DOWN, DEPRESSED OR HOPELESS: 0
SUM OF ALL RESPONSES TO PHQ9 QUESTIONS 1 & 2: 0
SUM OF ALL RESPONSES TO PHQ QUESTIONS 1-9: 0

## 2022-11-15 ASSESSMENT — SOCIAL DETERMINANTS OF HEALTH (SDOH): HOW HARD IS IT FOR YOU TO PAY FOR THE VERY BASICS LIKE FOOD, HOUSING, MEDICAL CARE, AND HEATING?: NOT VERY HARD

## 2022-11-15 NOTE — PROGRESS NOTES
[address] PRIMARY CARE  [address] [facility] [address]  [address] [address]  Dept: [phone]  Dept Fax: [phone]    Kendra Vásquez is a 28 y.o. male who presents today for his medical conditions/complaints as noted below. Chief Complaint   Patient presents with    Establish Care     Previous pt of Kat Jose CNP in [place]; has had a cough x 1 week recently recovering from sinus infection; diabetes; would like to discuss medications metformin causes increased upset stomach and lipitor causes increased body cramps       HPI:     Patient presents to the office to establish care. Prior patient of Kat Jose, ALEYDA. He has significant past medical history including hypertension, obstructive sleep apnea, type 2 diabetes, obesity, hyperlipidemia. Patient reports he is very worried about his chronic diseases and would like to have better continuity of care. He did not feel like he was is being listened to at previous office. He stopped all medications other than insulin. He is currently using 70 units of Tresiba nightly and intermittent short acting during the day. He does not routinely check blood sugars. He reports that cholesterol medicine caused too many body aches and cramping. He reports metformin caused upset stomach. He was supposed to be taking 1000 mg twice daily. He reports that he is getting over upper respiratory infection. He has a slight lingering cough but otherwise no significant symptoms. He does mention low libido issues and intermittent impotence. We discussed this can be due to diabetes. No other new or acute concerns. BP is fairly controlled today. Weight is down from his last [facility] office visit. Diabetes  He presents for his follow-up diabetic visit. He has type 2 diabetes mellitus. His disease course has been worsening. Hypoglycemia symptoms include sweats.  Pertinent negatives for hypoglycemia include no confusion, dizziness, headaches or nervousness/anxiousness. Pertinent negatives for diabetes include no blurred vision, no chest pain, no fatigue and no foot paresthesias. Diabetic complications include impotence. Risk factors for coronary artery disease include diabetes mellitus, dyslipidemia, family history, male sex, obesity, hypertension and sedentary lifestyle. Current diabetic treatment includes insulin injections, diet and oral agent (monotherapy). He is compliant with treatment some of the time. He is following a generally unhealthy diet. When asked about meal planning, he reported none. He has not had a previous visit with a dietitian. He rarely participates in exercise. An ACE inhibitor/angiotensin II receptor blocker is not being taken. He does not see a podiatrist.Eye exam is not current. Hemoglobin A1C (%)   Date Value   11/15/2022 8.4   05/23/2022 7.6   08/23/2021 8.5             ( goal A1C is < 7)   Microalb/Crt.  Ratio (mcg/mg creat)   Date Value   07/16/2020 CANNOT BE CALCULATED     LDL Cholesterol (mg/dL)   Date Value   07/16/2020 66     LDL Calculated (mg/dL)   Date Value   07/30/2019 120.6   07/25/2019 77.8   10/22/2018 111.6       (goal LDL is <100)   AST (U/L)   Date Value   07/16/2020 22     ALT (U/L)   Date Value   07/16/2020 50 (H)     BUN (mg/dL)   Date Value   07/16/2020 14     BP Readings from Last 3 Encounters:   11/15/22 130/82   05/23/22 122/80   09/17/21 122/88          (goal 120/80)    Past Medical History:   Diagnosis Date    Essential hypertension 10/29/2019    Gastrointestinal hemorrhage with hematemesis     GERD (gastroesophageal reflux disease)     GI bleed 5/3/2016    Hyperlipidemia     Hypothyroidism       Past Surgical History:   Procedure Laterality Date    APPENDECTOMY  2013    ARM SURGERY Left 2006    left arm reconstruction - fracture    [place] OFFICE/OUTPT VISIT,PROCEDURE ONLY Left 6/21/2018    SHOULDER ARTHROSCOPY, OPEN CAPSULAR SHIFT REPAIR performed by Nina Pedroza MD at [facility] OR    SHOULDER ARTHROSCOPY Left 06/21/2018    with open capsular shift    UPPER GASTROINTESTINAL ENDOSCOPY  2014    [facility] for hematemesis; found hiatal hernia    UPPER GASTROINTESTINAL ENDOSCOPY  05/03/2016    moderate gastritis       Family History   Problem Relation Age of Onset    High Blood Pressure Mother     Heart Attack Mother     High Blood Pressure Father     Heart Attack Maternal Grandmother     Heart Attack Maternal Grandfather     Diabetes Maternal Grandfather        Social History     Tobacco Use    Smoking status: Never    Smokeless tobacco: Former    Tobacco comments: MIKE Domingo RRT 5/3/2016   Substance Use Topics    Alcohol use: Yes     Alcohol/week: 0.0 standard drinks     Comment: occasional      Current Outpatient Medications   Medication Sig Dispense Refill    Dulaglutide 0.75 MG/0.5ML SOPN Inject 0.75 mg into the skin once a week 2 mL 0    lisinopril (PRINIVIL;ZESTRIL) 10 MG tablet Take 1 tablet by mouth daily 90 tablet 0    metFORMIN (GLUCOPHAGE) 500 MG tablet Take 1 tablet by mouth daily (with breakfast) 30 tablet 0    Continuous Blood Gluc Transmit (DEXCOM G6 TRANSMITTER) MISC Use with associated  and sensors for continuous glucose monitoring. 1 each 0    Continuous Blood Gluc Sensor (DEXCOM G6 SENSOR) MISC Apply every 10 days for continuous glucose monitoring. 3 each 2    Continuous Blood Gluc  (DEXCOM G6 ) ALEXIA Use with associated Dexcom transmitter for continuous glucose monitoring. 1 each 0    Insulin Degludec (TRESIBA FLEXTOUCH) 200 UNIT/ML SOPN inject 70 units subcutaneously at bedtime 105 mL 2    blood glucose monitor kit and supplies 1 kit by Other route 2 times daily Test 4 times a day & as needed for symptoms of irregular blood glucose. 1 kit 0    Lancets MISC Monitor blood sugar 4 times daily and as needed. 100 each 3    blood glucose monitor strips Test 4 times a day & as needed for symptoms of irregular blood glucose.  Dispense sufficient amount for indicated testing frequency plus additional to accommodate PRN testing needs. 100 strip 0    Lancets (ONETOUCH DELICA PLUS [id number]) MISC USE AS DIRECTED FOUR TIMES A  each 3    blood glucose monitor strips 1 strip by Other route 4 times daily Please dispense test strips compatible with the patient's glucometer True Metrix. 100 strip 3    Insulin Pen Needle 32G X 4 MM MISC 1 each by Does not apply route daily 100 each 3    glucose monitoring kit (FREESTYLE) monitoring kit 1 kit by Does not apply route daily 1 kit 0    acetaminophen 650 MG TABS Take 650 mg by mouth every 4 hours as needed 120 tablet 0     No current facility-administered medications for this visit. Allergies   Allergen Reactions    Cephalexin Hives    Sulfa Antibiotics Hives       Health Maintenance   Topic Date Due    COVID-19 Vaccine (1) Never done    Varicella vaccine (1 of 2 - 2-dose childhood series) Never done    Hepatitis B vaccine (1 of 3 - Risk 3-dose series) Never done    Diabetic microalbuminuria test  07/16/2021    Lipids  07/16/2021    Diabetic retinal exam  08/12/2021    Diabetic foot exam  08/23/2022    HIV screen  12/14/2027 (Originally 10/7/2009)    Depression Screen  05/23/2023    A1C test (Diabetic or Prediabetic)  11/15/2023    DTaP/Tdap/Td vaccine (3 - Td or Tdap) 05/10/2029    Flu vaccine  Completed    Pneumococcal 0-64 years Vaccine  Completed    Hepatitis C screen  Completed    Hepatitis A vaccine  Aged Out    Hib vaccine  Aged Out    Meningococcal (ACWY) vaccine  Aged Out       Subjective:      Review of Systems   Constitutional:  Negative for chills, fatigue and fever. HENT:  Negative for congestion, rhinorrhea and sinus pain. Eyes:  Negative for blurred vision and pain. Respiratory:  Negative for cough and shortness of breath. Cardiovascular:  Negative for chest pain and leg swelling. Gastrointestinal:  Negative for abdominal pain, constipation, diarrhea, nausea and vomiting.    Genitourinary: Positive for impotence. Negative for difficulty urinating, enuresis and testicular pain.        + low libido   Musculoskeletal:  Negative for arthralgias, back pain and myalgias. Skin:  Negative for rash. Neurological:  Negative for dizziness and headaches. Psychiatric/Behavioral:  Negative for confusion and sleep disturbance. The patient is not nervous/anxious. All other systems reviewed and are negative. Objective:     Physical Exam  Vitals and nursing note reviewed. Constitutional:       General: He is not in acute distress. Appearance: Normal appearance. He is obese. HENT:      Head: Normocephalic. Right Ear: External ear normal.      Left Ear: External ear normal.      Mouth/Throat:      Mouth: Mucous membranes are moist.   Eyes:      Extraocular Movements: Extraocular movements intact. Conjunctiva/sclera: Conjunctivae normal.      Pupils: Pupils are equal, round, and reactive to light. Cardiovascular:      Rate and Rhythm: Normal rate and regular rhythm. Pulses: Normal pulses. Heart sounds: Normal heart sounds. No murmur heard. Pulmonary:      Effort: Pulmonary effort is normal. No respiratory distress. Breath sounds: Normal breath sounds. Abdominal:      General: Abdomen is flat. Bowel sounds are normal.      Palpations: Abdomen is soft. Tenderness: There is no abdominal tenderness. Musculoskeletal:      Cervical back: Normal range of motion. Right lower leg: No edema. Left lower leg: No edema. Lymphadenopathy:      Cervical: No cervical adenopathy. Skin:     General: Skin is warm. Capillary Refill: Capillary refill takes less than 2 seconds. Neurological:      General: No focal deficit present. Mental Status: He is alert and oriented to person, place, and time.    Psychiatric:         Mood and Affect: Mood normal.         Behavior: Behavior normal.     /82 (Site: Left Upper Arm, Position: Sitting, Cuff Size: Large Adult) Pulse (!) 104   Resp 16   Ht 5' 11\" (1.803 m)   Wt (!) 304 lb 6.4 oz (138.1 kg)   SpO2 97%   BMI 42.46 kg/m²     Assessment:       ICD-10-CM    1. Type 2 diabetes mellitus with hyperglycemia, with long-term current use of insulin (HCC)  E11.65 POCT glycosylated hemoglobin (Hb A1C)    Z79.4 Dulaglutide 0.75 MG/0.5ML SOPN     metFORMIN (GLUCOPHAGE) 500 MG tablet     Lipid Panel     Microalbumin, Ur     Comprehensive Metabolic Panel, Fasting     Continuous Blood Gluc Transmit (DEXCOM G6 TRANSMITTER) MISC     Continuous Blood Gluc Sensor (DEXCOM G6 SENSOR) MISC     Continuous Blood Gluc  (DEXCOM G6 ) ALEXIA      2. Morbid obesity due to excess calories ([facility])  E66.01       3. Essential hypertension  I10 lisinopril (PRINIVIL;ZESTRIL) 10 MG tablet      4. Hypothyroidism, unspecified type  E03.9 TSH With Reflex Ft4      5. Low libido  R68.82 Testosterone, Free      6. Need for influenza vaccination  Z23 Influenza, FLUCELVAX, (age 10 mo+), IM, Preservative Free, 0.5 mL               Plan:      1. Patient with chronic type 2 diabetes. He is not currently well controlled with A1c of 8.4. He is currently only using insulin without closely monitoring his sugars. Plan to start metformin and Trulicity and slowly increase dosages as necessary. We discussed instructions and side effects to length. Patient will also be given prescription of Dexcom device for continuous glucose monitoring. I stressed the importance of close glucose monitoring so that we can slowly decrease insulin as necessary. He is to call the office in 2 weeks with glucose readings. 2.  I had a long discussion with patient on the importance of weight management. I strongly advised gradually increasing physical activity to 30 minutes daily. I also encouraged reducing concentrated carbohydrates, large portions, snacking. 3.  Plan to restart lisinopril 10 mg for management of hypertension and renal protection.   4.  Plan to reassess thyroid with history of hypothyroidism and multiple chronic diseases. 5.  Plan to assess testosterone with obesity and low libido. 6.  Patient agreeable to influenza vaccine. Return in about 4 weeks (around 12/13/2022) for medication recheck, blood pressure. Orders Placed This Encounter   Procedures    Influenza, FLUCELVAX, (age 10 mo+), IM, Preservative Free, 0.5 mL    Lipid Panel     Standing Status:   Future     Standing Expiration Date:   2/15/2023     Order Specific Question:   Is Patient Fasting?/# of Hours     Answer: Fast 8-10 hours    Microalbumin, Ur     Standing Status:   Future     Standing Expiration Date:   11/15/2023    Comprehensive Metabolic Panel, Fasting     Standing Status:   Future     Standing Expiration Date:   11/15/2023    TSH With Reflex Ft4     Standing Status:   Future     Standing Expiration Date:   11/15/2023    Testosterone, Free     Standing Status:   Future     Standing Expiration Date:   11/15/2023    POCT glycosylated hemoglobin (Hb A1C)         Patient given educational materials - see patient instructions. Discussed use, benefit, and side effects of prescribed medications. All patient questions answered. Pt voiced understanding. Reviewed health maintenance. Instructed to continue current medications, diet and exercise. Patient agreed with treatment plan. Follow up as directed. Electronically signed by Migdalia Aldrich PA-C on 11/15/2022 at 2:54 PM    I spent a total of 40 minutes with this patient encounter including reviewing prior charts/labs, face-to-face, education, documentation.

## 2022-11-16 ENCOUNTER — HOSPITAL ENCOUNTER (OUTPATIENT)
Age: 27
Setting detail: SPECIMEN
Discharge: HOME | End: 2022-11-16

## 2022-11-16 DIAGNOSIS — R68.82 LOW LIBIDO: ICD-10-CM

## 2022-11-16 DIAGNOSIS — R79.89 LOW TESTOSTERONE IN MALE: Primary | ICD-10-CM

## 2022-11-16 DIAGNOSIS — E03.9 HYPOTHYROIDISM, UNSPECIFIED TYPE: ICD-10-CM

## 2022-11-16 DIAGNOSIS — E11.65 TYPE 2 DIABETES MELLITUS WITH HYPERGLYCEMIA, WITH LONG-TERM CURRENT USE OF INSULIN (HCC): ICD-10-CM

## 2022-11-16 DIAGNOSIS — Z79.4 TYPE 2 DIABETES MELLITUS WITH HYPERGLYCEMIA, WITH LONG-TERM CURRENT USE OF INSULIN (HCC): ICD-10-CM

## 2022-11-16 LAB
ALBUMIN SERPL-MCNC: 4.2 G/DL (ref 3.5–5.2)
ALBUMIN/GLOBULIN RATIO: 1.2 (ref 1–2.5)
ALP BLD-CCNC: 84 U/L (ref 40–129)
ALT SERPL-CCNC: 34 U/L (ref 5–41)
ANION GAP SERPL CALCULATED.3IONS-SCNC: 14 MMOL/L (ref 9–17)
AST SERPL-CCNC: 24 U/L
BILIRUB SERPL-MCNC: 0.6 MG/DL (ref 0.3–1.2)
BUN BLDV-MCNC: 15 MG/DL (ref 6–20)
CALCIUM SERPL-MCNC: 9.6 MG/DL (ref 8.6–10.4)
CHLORIDE BLD-SCNC: 96 MMOL/L (ref 98–107)
CHOLESTEROL/HDL RATIO: 7
CHOLESTEROL: 211 MG/DL
CO2: 27 MMOL/L (ref 20–31)
CREAT SERPL-MCNC: 0.85 MG/DL (ref 0.7–1.2)
CREATININE URINE: 231.8 MG/DL (ref 39–259)
GFR SERPL CREATININE-BSD FRML MDRD: >60 ML/MIN/1.73M2
GLUCOSE FASTING: 173 MG/DL (ref 70–99)
HDLC SERPL-MCNC: 30 MG/DL
LDL CHOLESTEROL: 142 MG/DL (ref 0–130)
MICROALBUMIN/CREAT 24H UR: 14 MG/L
MICROALBUMIN/CREAT UR-RTO: 6 MCG/MG CREAT
POTASSIUM SERPL-SCNC: 4.6 MMOL/L (ref 3.7–5.3)
SEX HORMONE BINDING GLOBULIN: 10 NMOL/L (ref 11–80)
SODIUM BLD-SCNC: 137 MMOL/L (ref 135–144)
TESTOSTERONE FREE-NONMALE: 62.9 PG/ML (ref 47–244)
TESTOSTERONE TOTAL: 199 NG/DL (ref 220–1000)
TOTAL PROTEIN: 7.6 G/DL (ref 6.4–8.3)
TRIGL SERPL-MCNC: 193 MG/DL
TSH SERPL DL<=0.05 MIU/L-ACNC: 2.22 UIU/ML (ref 0.3–5)

## 2022-11-18 ASSESSMENT — ENCOUNTER SYMPTOMS: BLURRED VISION: 0

## 2022-11-21 ENCOUNTER — TELEPHONE (OUTPATIENT)
Dept: PRIMARY CARE CLINIC | Age: 27
End: 2022-11-21

## 2022-11-21 NOTE — TELEPHONE ENCOUNTER
Pt stopped by the office and asked if he could get a something called into the pharmacy for him.  Said he is having drainage and its causing him to cough, please advice    [facility] in [place]

## 2022-11-22 ENCOUNTER — TELEMEDICINE (OUTPATIENT)
Dept: PRIMARY CARE CLINIC | Age: 27
End: 2022-11-22
Payer: COMMERCIAL

## 2022-11-22 DIAGNOSIS — J06.9 UPPER RESPIRATORY TRACT INFECTION, UNSPECIFIED TYPE: Primary | ICD-10-CM

## 2022-11-22 PROCEDURE — G8427 DOCREV CUR MEDS BY ELIG CLIN: HCPCS | Performed by: PHYSICIAN ASSISTANT

## 2022-11-22 PROCEDURE — 99213 OFFICE O/P EST LOW 20 MIN: CPT | Performed by: PHYSICIAN ASSISTANT

## 2022-11-22 RX ORDER — FLUTICASONE PROPIONATE 50 MCG
1 SPRAY, SUSPENSION (ML) NASAL DAILY
Qty: 16 G | Refills: 0 | Status: SHIPPED | OUTPATIENT
Start: 2022-11-22

## 2022-11-22 RX ORDER — AZITHROMYCIN 250 MG/1
250 TABLET, FILM COATED ORAL SEE ADMIN INSTRUCTIONS
Qty: 6 TABLET | Refills: 0 | Status: SHIPPED | OUTPATIENT
Start: 2022-11-22 | End: 2022-11-27

## 2022-11-22 RX ORDER — BENZONATATE 100 MG/1
CAPSULE ORAL
COMMUNITY
Start: 2022-11-20

## 2022-11-22 ASSESSMENT — ENCOUNTER SYMPTOMS
SINUS PRESSURE: 0
ABDOMINAL PAIN: 0
SINUS PAIN: 0
WHEEZING: 0
SORE THROAT: 1
COUGH: 1
COLOR CHANGE: 0
RHINORRHEA: 1
CONSTIPATION: 0
SHORTNESS OF BREATH: 0

## 2022-11-22 NOTE — TELEPHONE ENCOUNTER
I recommend he self test COVID-19 at home. Also, please recommend appointment VV or evisit so we can get more information.     Lisa Zeng

## 2022-11-22 NOTE — PROGRESS NOTES
2022    TELEHEALTH EVALUATION -- Audio/Visual (During ASZCK- public health emergency)    HPI:    Anthony Bahena (:  1995) has requested an audio/video evaluation for the following concern(s):    Patient presents as a virtual visit for persistent upper respiratory concerns. He states he has had a cough ongoing for the last week to 10 days. Accompanied with his he admits to congestion, postnasal drainage, scratchy throat. No fevers or chills. No wheezing or shortness of breath. No chest pain. He was evaluated by urgent care this past weekend with his daughter and was told it was likely viral.  They were given Pauline Short with out benefit. No other complaints or concerns    Review of Systems   Constitutional:  Negative for chills and fever. HENT:  Positive for congestion, postnasal drip, rhinorrhea and sore throat. Negative for sinus pressure and sinus pain. Respiratory:  Positive for cough. Negative for shortness of breath and wheezing. Cardiovascular:  Negative for chest pain and leg swelling. Gastrointestinal:  Negative for abdominal pain and constipation. Genitourinary:  Negative for difficulty urinating. Musculoskeletal:  Negative for arthralgias. Skin:  Negative for color change. Neurological:  Negative for dizziness and headaches. Psychiatric/Behavioral:  Negative for agitation. Prior to Visit Medications    Medication Sig Taking?  Authorizing Provider   benzonatate (TESSALON) 100 MG capsule TAKE 1 TO 2 CAPSULES BY MOUTH EVERY 8 HOURS AS NEEDED FOR COUGH Yes Historical Provider, MD   azithromycin (ZITHROMAX) 250 MG tablet Take 1 tablet by mouth See Admin Instructions for 5 days 500mg on day 1 followed by 250mg on days 2 - 5 Yes Juan Cisneros PA-C   fluticasone (FLONASE) 50 MCG/ACT nasal spray 1 spray by Each Nostril route daily Yes Juan Cisneros PA-C   Dulaglutide 0.75 MG/0.5ML SOPN Inject 0.75 mg into the skin once a week Yes Juan Cisneros PA-C lisinopril (PRINIVIL;ZESTRIL) 10 MG tablet Take 1 tablet by mouth daily Yes Ilana Benson PA-C   metFORMIN (GLUCOPHAGE) 500 MG tablet Take 1 tablet by mouth daily (with breakfast) Yes Ilana Benson PA-C   Continuous Blood Gluc Transmit (DEXCOM G6 TRANSMITTER) MISC Use with associated  and sensors for continuous glucose monitoring. Yes Ilana Benson PA-C   Continuous Blood Gluc Sensor (DEXCOM G6 SENSOR) MISC Apply every 10 days for continuous glucose monitoring. Yes Ilana Benson PA-C   Continuous Blood Gluc  ([address]) ALEXIA Use with associated Dexcom transmitter for continuous glucose monitoring. Yes Ilana Benson PA-C   Insulin Degludec (TRESIBA FLEXTOUCH) 200 UNIT/ML SOPN inject 70 units subcutaneously at bedtime Yes ROBSON Mcintosh CNP   blood glucose monitor kit and supplies 1 kit by Other route 2 times daily Test 4 times a day & as needed for symptoms of irregular blood glucose. Yes ROBSON Mcintosh CNP   Lancets MISC Monitor blood sugar 4 times daily and as needed. Yes ROBSON Mcintosh CNP   blood glucose monitor strips Test 4 times a day & as needed for symptoms of irregular blood glucose. Dispense sufficient amount for indicated testing frequency plus additional to accommodate PRN testing needs. Yes ROBSON Mcintosh CNP   Lancets [facility] PLUS [id number]) [address] USE AS DIRECTED FOUR TIMES A DAY Yes ROBSON Mcintosh CNP   blood glucose monitor strips 1 strip by Other route 4 times daily Please dispense test strips compatible with the patient's glucometer True Metrix.  Yes ROBSON Mcintosh CNP   Insulin Pen Needle 32G X 4 MM MISC 1 each by Does not apply route daily Yes ROBSON Mcintosh CNP   glucose monitoring kit (FREESTYLE) monitoring kit 1 kit by Does not apply route daily Yes Layne Wade MD   acetaminophen 650 MG TABS Take 650 mg by mouth every 4 hours as needed Yes Devi Thurston MD   pantoprazole (PROTONIX) 40 MG tablet take 1 tablet by mouth once daily  Ko Mcnamara, APRN - CNP       Social History     Tobacco Use    Smoking status: Never    Smokeless tobacco: Former    Tobacco comments: MIKE Domingo RRT 5/3/2016   Vaping Use    Vaping Use: Never used   Substance Use Topics    Alcohol use: Yes     Alcohol/week: 0.0 standard drinks     Comment: occasional    Drug use: No        Allergies   Allergen Reactions    Cephalexin Hives    Sulfa Antibiotics Hives   ,   Past Medical History:   Diagnosis Date    Essential hypertension 10/29/2019    Gastrointestinal hemorrhage with hematemesis     GERD (gastroesophageal reflux disease)     GI bleed 5/3/2016    Hyperlipidemia     Hypothyroidism    ,   Past Surgical History:   Procedure Laterality Date    APPENDECTOMY  2013    ARM SURGERY Left 2006    left arm reconstruction - fracture    [place] OFFICE/OUTPT VISIT,PROCEDURE ONLY Left 6/21/2018    SHOULDER ARTHROSCOPY, OPEN CAPSULAR SHIFT REPAIR performed by Yasmany Gonzalez MD at [facility] ARTHROSCOPY Left 06/21/2018    with open capsular shift    UPPER GASTROINTESTINAL ENDOSCOPY  2014    [place] for hematemesis; found hiatal hernia    UPPER GASTROINTESTINAL ENDOSCOPY  05/03/2016    moderate gastritis   ,   Social History     Tobacco Use    Smoking status: Never    Smokeless tobacco: Former    Tobacco comments: MIKE Domingo RRT 5/3/2016   Vaping Use    Vaping Use: Never used   Substance Use Topics    Alcohol use:  Yes     Alcohol/week: 0.0 standard drinks     Comment: occasional    Drug use: No   ,   Family History   Problem Relation Age of Onset    High Blood Pressure Mother     Heart Attack Mother     High Blood Pressure Father     Heart Attack Maternal Grandmother     Heart Attack Maternal Grandfather     Diabetes Maternal Grandfather    ,   Immunization History   Administered Date(s) Administered    DTaP vaccine 04/15/2000    Influenza, FLUARIX, FLULAVAL, FLUZONE (age 10 mo+) AND AFLURIA, (age 1 y+), PF, 0.5mL 08/25/2017, 10/15/2018, 11/07/2019    Influenza, FLUCELVAX, (age 10 mo+), MDCK, PF, 0.5mL 11/15/2022    MMR 04/15/2000    Pneumococcal Polysaccharide (Pvmutaala30) 05/10/2019    Polio IPV (IPOL) 04/15/2000    Tdap (Boostrix, Adacel) 05/10/2019    Tetanus Toxoid, absorbed 04/04/2015       PHYSICAL EXAMINATION:  [ INSTRUCTIONS:  \"[x]\" Indicates a positive item  \"[]\" Indicates a negative item  -- DELETE ALL ITEMS NOT EXAMINED]  Vital Signs: (As obtained by patient/caregiver or practitioner observation)      Constitutional: [x] Appears well-developed and well-nourished [x] No apparent distress      [] Abnormal-   Mental status  [x] Alert and awake  [x] Oriented to person/place/time [x]Able to follow commands      Eyes:  EOM    [x]  Normal  [] Abnormal-  Sclera  [x]  Normal  [] Abnormal -         Discharge []  None visible  [] Abnormal -    HENT:   [x] Normocephalic, atraumatic. [] Abnormal   [x] Mouth/Throat: Mucous membranes are moist.     External Ears [x] Normal  [] Abnormal-     Neck: [x] No visualized mass     Pulmonary/Chest: [x] Respiratory effort normal.  [x] No visualized signs of difficulty breathing or respiratory distress        [] Abnormal-      Musculoskeletal:   [x] Normal range of motion of neck        [] Abnormal-       Neurological:        [x] No Facial Asymmetry (Cranial nerve 7 motor function) (limited exam to video visit)          [x] No gaze palsy        [] Abnormal-         Skin:        [x] No significant exanthematous lesions or discoloration noted on facial skin         [] Abnormal-            Psychiatric:       [x] Normal Affect [x] No Hallucinations        [] Abnormal-     Other pertinent observable physical exam findings-     ASSESSMENT/PLAN:  1. Upper respiratory tract infection, unspecified type  - azithromycin (ZITHROMAX) 250 MG tablet; Take 1 tablet by mouth See Admin Instructions for 5 days 500mg on day 1 followed by 250mg on days 2 - 5  Dispense: 6 tablet;  Refill: 0  - fluticasone (FLONASE) 50 MCG/ACT nasal spray; 1 spray by Each Nostril route daily  Dispense: 16 g; Refill: 0    Patient with URI his last greater than 7 days. Plan to treat with Z-Wolfgang. He is a diabetic and we will avoid steroids. Also encouraged Flonase, Claritin/Zyrtec, fluids, Tylenol as needed. He is agreeable to plan. Return if symptoms worsen or fail to improve. [place] [place], was evaluated through a synchronous (real-time) audio-video encounter. The patient (or guardian if applicable) is aware that this is a billable service, which includes applicable co-pays. This Virtual Visit was conducted with patient's (and/or legal guardian's) consent. The visit was conducted pursuant to the emergency declaration under the [facility], [facility] authority and the [employer] General Act. Patient identification was verified, and a caregiver was present when appropriate. The patient was located at Home: [address] [place] [address].. Provider was located at [facility] ([address] [place] [address]): [address] [place] [address] [address]  [address] [address]  [facility] max Ferrari,  \[facility] [place] [facility]\"" [address] Total time spent on this encounter: Not billed by time    --Lilia Quan PA-C on 11/22/2022 at 10:48 AM    An electronic signature was used to authenticate this note.

## 2022-11-29 DIAGNOSIS — Z79.4 TYPE 2 DIABETES MELLITUS WITH HYPERGLYCEMIA, WITH LONG-TERM CURRENT USE OF INSULIN (HCC): ICD-10-CM

## 2022-11-29 DIAGNOSIS — E11.65 TYPE 2 DIABETES MELLITUS WITH HYPERGLYCEMIA, WITH LONG-TERM CURRENT USE OF INSULIN (HCC): ICD-10-CM

## 2022-11-29 RX ORDER — INSULIN DEGLUDEC 200 U/ML
INJECTION, SOLUTION SUBCUTANEOUS
Qty: 105 ML | Refills: 2 | Status: SHIPPED | OUTPATIENT
Start: 2022-11-29

## 2022-11-29 RX ORDER — INSULIN LISPRO 100 [IU]/ML
INJECTION, SOLUTION INTRAVENOUS; SUBCUTANEOUS
Qty: 39 ML | Refills: 0 | Status: SHIPPED | OUTPATIENT
Start: 2022-11-29

## 2022-12-11 DIAGNOSIS — Z79.4 TYPE 2 DIABETES MELLITUS WITH HYPERGLYCEMIA, WITH LONG-TERM CURRENT USE OF INSULIN (HCC): ICD-10-CM

## 2022-12-11 DIAGNOSIS — E11.65 TYPE 2 DIABETES MELLITUS WITH HYPERGLYCEMIA, WITH LONG-TERM CURRENT USE OF INSULIN (HCC): ICD-10-CM

## 2022-12-13 NOTE — TELEPHONE ENCOUNTER
Please advise patient I would like for him to increase metformin to 500 mg twice daily with breakfast and dinner.

## 2022-12-14 ENCOUNTER — OFFICE VISIT (OUTPATIENT)
Dept: PRIMARY CARE CLINIC | Age: 27
End: 2022-12-14
Payer: COMMERCIAL

## 2022-12-14 VITALS
RESPIRATION RATE: 16 BRPM | HEIGHT: 71 IN | BODY MASS INDEX: 42.48 KG/M2 | HEART RATE: 94 BPM | WEIGHT: 303.4 LBS | SYSTOLIC BLOOD PRESSURE: 122 MMHG | DIASTOLIC BLOOD PRESSURE: 82 MMHG | OXYGEN SATURATION: 98 %

## 2022-12-14 DIAGNOSIS — I10 ESSENTIAL HYPERTENSION: ICD-10-CM

## 2022-12-14 DIAGNOSIS — E78.5 DYSLIPIDEMIA: ICD-10-CM

## 2022-12-14 DIAGNOSIS — R79.89 LOW TESTOSTERONE IN MALE: ICD-10-CM

## 2022-12-14 DIAGNOSIS — E11.65 TYPE 2 DIABETES MELLITUS WITH HYPERGLYCEMIA, WITH LONG-TERM CURRENT USE OF INSULIN (HCC): Primary | ICD-10-CM

## 2022-12-14 DIAGNOSIS — Z79.4 TYPE 2 DIABETES MELLITUS WITH HYPERGLYCEMIA, WITH LONG-TERM CURRENT USE OF INSULIN (HCC): Primary | ICD-10-CM

## 2022-12-14 PROBLEM — L60.0 INGROWING NAIL: Status: ACTIVE | Noted: 2022-07-26

## 2022-12-14 PROBLEM — J32.0 CHRONIC MAXILLARY SINUSITIS: Status: ACTIVE | Noted: 2022-09-27

## 2022-12-14 PROBLEM — R05.9 COUGH, UNSPECIFIED: Status: ACTIVE | Noted: 2022-09-27

## 2022-12-14 PROCEDURE — G8482 FLU IMMUNIZE ORDER/ADMIN: HCPCS | Performed by: PHYSICIAN ASSISTANT

## 2022-12-14 PROCEDURE — 3078F DIAST BP <80 MM HG: CPT | Performed by: PHYSICIAN ASSISTANT

## 2022-12-14 PROCEDURE — 3052F HG A1C>EQUAL 8.0%<EQUAL 9.0%: CPT | Performed by: PHYSICIAN ASSISTANT

## 2022-12-14 PROCEDURE — 3074F SYST BP LT 130 MM HG: CPT | Performed by: PHYSICIAN ASSISTANT

## 2022-12-14 PROCEDURE — G8417 CALC BMI ABV UP PARAM F/U: HCPCS | Performed by: PHYSICIAN ASSISTANT

## 2022-12-14 PROCEDURE — G8427 DOCREV CUR MEDS BY ELIG CLIN: HCPCS | Performed by: PHYSICIAN ASSISTANT

## 2022-12-14 PROCEDURE — 2022F DILAT RTA XM EVC RTNOPTHY: CPT | Performed by: PHYSICIAN ASSISTANT

## 2022-12-14 PROCEDURE — 99214 OFFICE O/P EST MOD 30 MIN: CPT | Performed by: PHYSICIAN ASSISTANT

## 2022-12-14 PROCEDURE — 1036F TOBACCO NON-USER: CPT | Performed by: PHYSICIAN ASSISTANT

## 2022-12-14 RX ORDER — DULAGLUTIDE 1.5 MG/.5ML
1.5 INJECTION, SOLUTION SUBCUTANEOUS WEEKLY
Qty: 4 ADJUSTABLE DOSE PRE-FILLED PEN SYRINGE | Refills: 0 | Status: SHIPPED | OUTPATIENT
Start: 2022-12-14

## 2022-12-14 RX ORDER — PRAVASTATIN SODIUM 20 MG
20 TABLET ORAL DAILY
Qty: 30 TABLET | Refills: 2 | Status: SHIPPED | OUTPATIENT
Start: 2022-12-14

## 2022-12-14 NOTE — PROGRESS NOTES
[address] [place] [address] CARE  [facility] [address]  [address] [address]  Dept: [phone]  Dept Fax: [phone]    Petra Franklin is a 28 y.o. male who presents today for his medical conditions/complaints as noted below. Chief Complaint   Patient presents with    Hypertension    Ear Problem     Pain when cleaning left ear, unsure if it is a blackhead       HPI:     Patient presents to the office for medication follow-up. He has known history of hypertension, type 2 diabetes, dyslipidemia, obesity, MAGDALENA. Labs resulted. There is evidence of low testosterone. Cholesterol has worsened compared to previous year. Patient reports he is tolerating metformin and Trulicity. Denies significant side effects. He continues to use 70 units of long-acting insulin. He is no longer using short acting insulin. Patient is monitoring glucose closely with Dexcom. Denies any significant hypoglycemic episodes. He has noticed gradual decrease in overall sugars. Glucose this morning was around 120. Patient does have concern for ear discomfort that started yesterday. We discussed there appears to be acne lesion on the edge of the ear canal which is likely causing pain. No obvious ear infection. No other concerns or complaints. BP stable. Weight stable. Hemoglobin A1C (%)   Date Value   11/15/2022 8.4   05/23/2022 7.6   08/23/2021 8.5             ( goal A1C is < 7)   Microalb/Crt.  Ratio (mcg/mg creat)   Date Value   11/16/2022 6     LDL Cholesterol (mg/dL)   Date Value   11/16/2022 142 (H)   07/16/2020 66     LDL Calculated (mg/dL)   Date Value   07/30/2019 120.6   07/25/2019 77.8   10/22/2018 111.6       (goal LDL is <100)   AST (U/L)   Date Value   11/16/2022 24     ALT (U/L)   Date Value   11/16/2022 34     BUN (mg/dL)   Date Value   11/16/2022 15     BP Readings from Last 3 Encounters:   12/14/22 122/82   11/15/22 130/82   05/23/22 122/80          (goal 120/80)    Past Medical History:   Diagnosis Date    Essential hypertension 10/29/2019    Gastrointestinal hemorrhage with hematemesis     GERD (gastroesophageal reflux disease)     GI bleed 5/3/2016    Hyperlipidemia     Hypothyroidism       Past Surgical History:   Procedure Laterality Date    APPENDECTOMY  2013    ARM SURGERY Left 2006    left arm reconstruction - fracture    [place] OFFICE/OUTPT VISIT,PROCEDURE ONLY Left 6/21/2018    SHOULDER ARTHROSCOPY, OPEN CAPSULAR SHIFT REPAIR performed by Charo Arechiga MD at [address] ARTHROSCOPY Left 06/21/2018    with open capsular shift    UPPER GASTROINTESTINAL ENDOSCOPY  2014    Zachary Ram for hematemesis; found hiatal hernia    UPPER GASTROINTESTINAL ENDOSCOPY  05/03/2016    moderate gastritis       Family History   Problem Relation Age of Onset    High Blood Pressure Mother     Heart Attack Mother     High Blood Pressure Father     Heart Attack Maternal Grandmother     Heart Attack Maternal Grandfather     Diabetes Maternal Grandfather        Social History     Tobacco Use    Smoking status: Never    Smokeless tobacco: Former    Tobacco comments: MIKE Domingo RRT 5/3/2016   Substance Use Topics    Alcohol use: Yes     Alcohol/week: 0.0 standard drinks     Comment: occasional      Current Outpatient Medications   Medication Sig Dispense Refill    pravastatin (PRAVACHOL) 20 MG tablet Take 1 tablet by mouth daily 30 tablet 2    dulaglutide (TRULICITY) 1.5 HI/9.3TD SC injection Inject 0.5 mLs into the skin once a week 4 Adjustable Dose Pre-filled Pen Syringe 0    metFORMIN (GLUCOPHAGE) 500 MG tablet Take 1 tablet by mouth 2 times daily (with meals) 60 tablet 2    Insulin Degludec (TRESIBA FLEXTOUCH) 200 UNIT/ML SOPN inject 70 units subcutaneously at bedtime 105 mL 2    insulin lispro, 1 Unit Dial, (HUMALOG/ADMELOG) 100 UNIT/ML SOPN INJECT SUBCUTANEOUSLY BEFORE MEALS PER SLIDING SCALE (5 UNITS FOR EVERY 50 ABOVE 150).  MAX  UNITS PER DAY, 39 mL 0 benzonatate (TESSALON) 100 MG capsule TAKE 1 TO 2 CAPSULES BY MOUTH EVERY 8 HOURS AS NEEDED FOR COUGH      fluticasone (FLONASE) 50 MCG/ACT nasal spray 1 spray by Each Nostril route daily 16 g 0    lisinopril (PRINIVIL;ZESTRIL) 10 MG tablet Take 1 tablet by mouth daily 90 tablet 0    Continuous Blood Gluc Transmit (DEXCOM G6 TRANSMITTER) MISC Use with associated  and sensors for continuous glucose monitoring. 1 each 0    Continuous Blood Gluc Sensor (DEXCOM G6 SENSOR) MISC Apply every 10 days for continuous glucose monitoring. 3 each 2    Continuous Blood Gluc  (DEXCOM G6 ) ALEXIA Use with associated Dexcom transmitter for continuous glucose monitoring. 1 each 0    blood glucose monitor kit and supplies 1 kit by Other route 2 times daily Test 4 times a day & as needed for symptoms of irregular blood glucose. 1 kit 0    Lancets MISC Monitor blood sugar 4 times daily and as needed. 100 each 3    blood glucose monitor strips Test 4 times a day & as needed for symptoms of irregular blood glucose. Dispense sufficient amount for indicated testing frequency plus additional to accommodate PRN testing needs. 100 strip 0    Lancets (ONETOUCH DELICA PLUS [id number]) MISC USE AS DIRECTED FOUR TIMES A  each 3    blood glucose monitor strips 1 strip by Other route 4 times daily Please dispense test strips compatible with the patient's glucometer True Metrix. 100 strip 3    Insulin Pen Needle 32G X 4 MM MISC 1 each by Does not apply route daily 100 each 3    glucose monitoring kit (FREESTYLE) monitoring kit 1 kit by Does not apply route daily 1 kit 0    acetaminophen 650 MG TABS Take 650 mg by mouth every 4 hours as needed 120 tablet 0     No current facility-administered medications for this visit.      Allergies   Allergen Reactions    Cephalexin Hives    Sulfa Antibiotics Hives       Health Maintenance   Topic Date Due    COVID-19 Vaccine (1) Never done    Varicella vaccine (1 of 2 - 2-dose childhood series) Never done    Hepatitis B vaccine (1 of 3 - Risk 3-dose series) Never done    Diabetic retinal exam  08/12/2021    HIV screen  12/14/2027 (Originally 10/7/2009)    A1C test (Diabetic or Prediabetic)  11/15/2023    Depression Screen  11/15/2023    Diabetic microalbuminuria test  11/16/2023    Lipids  11/16/2023    Diabetic foot exam  12/14/2023    DTaP/Tdap/Td vaccine (3 - Td or Tdap) 05/10/2029    Flu vaccine  Completed    Pneumococcal 0-64 years Vaccine  Completed    Hepatitis C screen  Completed    Hepatitis A vaccine  Aged Out    Hib vaccine  Aged Out    Meningococcal (ACWY) vaccine  Aged Out       Subjective:      Review of Systems   Constitutional:  Negative for chills, fatigue and fever. HENT:  Negative for congestion, rhinorrhea and sinus pain. Eyes:  Negative for pain. Respiratory:  Negative for cough and shortness of breath. Cardiovascular:  Negative for chest pain and leg swelling. Gastrointestinal:  Negative for abdominal pain, constipation, diarrhea, nausea and vomiting. Genitourinary:  Negative for difficulty urinating, enuresis and testicular pain. Musculoskeletal:  Negative for arthralgias, back pain and myalgias. Skin:  Negative for rash. Neurological:  Negative for dizziness and headaches. Psychiatric/Behavioral:  Negative for confusion and sleep disturbance. The patient is not nervous/anxious. All other systems reviewed and are negative. Objective:     Physical Exam  Vitals and nursing note reviewed. Constitutional:       General: He is not in acute distress. Appearance: Normal appearance. He is obese. HENT:      Head: Normocephalic. Right Ear: Tympanic membrane, ear canal and external ear normal. No mastoid tenderness. Tympanic membrane is not perforated or erythematous. Left Ear: Tympanic membrane and ear canal normal. No mastoid tenderness. Tympanic membrane is not perforated or erythematous. Ears:      Comments:  On the edge of the ear canal on the left there is a small pinpoint comedone acne lesion. Mouth/Throat:      Mouth: Mucous membranes are moist.   Eyes:      Extraocular Movements: Extraocular movements intact. Conjunctiva/sclera: Conjunctivae normal.      Pupils: Pupils are equal, round, and reactive to light. Cardiovascular:      Rate and Rhythm: Normal rate and regular rhythm. Pulses:           Posterior tibial pulses are 1+ on the right side and 1+ on the left side. Heart sounds: Normal heart sounds. Pulmonary:      Effort: Pulmonary effort is normal.      Breath sounds: Normal breath sounds. Abdominal:      General: Abdomen is flat. Bowel sounds are normal.      Palpations: Abdomen is soft. Tenderness: There is no abdominal tenderness. Musculoskeletal:      Cervical back: Normal range of motion. Right lower leg: No edema. Left lower leg: No edema. Right foot: Normal range of motion. Left foot: Normal range of motion. Feet:      Right foot:      Protective Sensation: 6 sites tested. 6 sites sensed. Skin integrity: No ulcer or erythema. Toenail Condition: Right toenails are normal.      Left foot:      Protective Sensation: 6 sites tested. 6 sites sensed. Skin integrity: No ulcer or erythema. Toenail Condition: Left toenails are normal.   Lymphadenopathy:      Cervical: No cervical adenopathy. Skin:     General: Skin is warm. Capillary Refill: Capillary refill takes less than 2 seconds. Neurological:      General: No focal deficit present. Mental Status: He is alert and oriented to person, place, and time. Psychiatric:         Mood and Affect: Mood normal.         Behavior: Behavior normal.     /82 (Site: Left Upper Arm, Position: Sitting, Cuff Size: Large Adult)   Pulse 94   Resp 16   Ht 5' 11\" (1.803 m)   Wt (!) 303 lb 6.4 oz (137.6 kg)   SpO2 98%   BMI 42.32 kg/m²     Assessment:       ICD-10-CM    1.  Type 2 diabetes mellitus with hyperglycemia, with long-term current use of insulin (HCC)  E11.65  DIABETES FOOT EXAM    Z79.4 dulaglutide (TRULICITY) 1.5 JW/2.2CR SC injection      2. Dyslipidemia  E78.5 pravastatin (PRAVACHOL) 20 MG tablet      3. Essential hypertension  I10       4. Low testosterone in male  R79.89                Plan:      1. Patient is tolerating metformin and Trulicity well. Plan to increase doses he will transition to metformin 500 mg twice daily and Trulicity 1.5 mg weekly. He is going to decrease insulin to 65 units. He is to call the office every other week to let me know of sugars so we can slowly wean insulin. Diabetic foot exam within normal limits. I recommend following with eye specialist for retinopathy exam.  Continue tracking with continuous glucose monitor. 2.  Patient with evidence of dyslipidemia on labs. He has intolerances to atorvastatin. Plan to trial pravastatin 20 mg daily. We also discussed the importance of dietary habits including reducing fried foods, large portions, saturated fats. 3.  Blood pressure stable. Continue lisinopril 10 mg daily. 4.  Evidence of low testosterone on labs. He is following with urology. Return in about 2 months (around 2/14/2023) for diabetes, medication recheck, blood pressure. Orders Placed This Encounter   Procedures     DIABETES FOOT EXAM         Patient given educational materials - see patient instructions. Discussed use, benefit, and side effects of prescribed medications. All patient questions answered. Pt voiced understanding. Reviewed health maintenance. Instructed to continue current medications, diet and exercise. Patient agreed with treatment plan. Follow up as directed.         Electronically signed by Sugar Robb PA-C on 12/15/2022 at 7:46 AM

## 2022-12-15 PROBLEM — R79.89 LOW TESTOSTERONE IN MALE: Status: ACTIVE | Noted: 2022-12-15

## 2022-12-15 ASSESSMENT — ENCOUNTER SYMPTOMS
DIARRHEA: 0
RHINORRHEA: 0
ABDOMINAL PAIN: 0
SINUS PAIN: 0
SHORTNESS OF BREATH: 0
EYE PAIN: 0
NAUSEA: 0
BACK PAIN: 0
VOMITING: 0
CONSTIPATION: 0
COUGH: 0

## 2022-12-29 DIAGNOSIS — E11.65 TYPE 2 DIABETES MELLITUS WITH HYPERGLYCEMIA, WITH LONG-TERM CURRENT USE OF INSULIN (HCC): ICD-10-CM

## 2022-12-29 DIAGNOSIS — Z79.4 TYPE 2 DIABETES MELLITUS WITH HYPERGLYCEMIA, WITH LONG-TERM CURRENT USE OF INSULIN (HCC): ICD-10-CM

## 2022-12-29 RX ORDER — BLOOD-GLUCOSE TRANSMITTER
EACH MISCELLANEOUS
Qty: 1 EACH | Refills: 0 | Status: SHIPPED | OUTPATIENT
Start: 2022-12-29

## 2022-12-29 RX ORDER — BLOOD-GLUCOSE SENSOR
EACH MISCELLANEOUS
Qty: 3 EACH | Refills: 2 | Status: SHIPPED | OUTPATIENT
Start: 2022-12-29

## 2023-01-03 ENCOUNTER — TELEPHONE (OUTPATIENT)
Dept: PRIMARY CARE CLINIC | Age: 28
End: 2023-01-03

## 2023-01-03 NOTE — TELEPHONE ENCOUNTER
[facility] from Dr Chase November office called requesting patient's CMP and Testosterone labs to be faxed to [phone]. Done.

## 2023-01-04 ENCOUNTER — TELEPHONE (OUTPATIENT)
Dept: PRIMARY CARE CLINIC | Age: 28
End: 2023-01-04

## 2023-01-04 DIAGNOSIS — Z79.4 TYPE 2 DIABETES MELLITUS WITH HYPERGLYCEMIA, WITH LONG-TERM CURRENT USE OF INSULIN (HCC): Primary | ICD-10-CM

## 2023-01-04 DIAGNOSIS — E11.65 TYPE 2 DIABETES MELLITUS WITH HYPERGLYCEMIA, WITH LONG-TERM CURRENT USE OF INSULIN (HCC): Primary | ICD-10-CM

## 2023-01-04 RX ORDER — BLOOD-GLUCOSE METER
1 KIT MISCELLANEOUS DAILY
Qty: 1 KIT | Refills: 0 | Status: SHIPPED | OUTPATIENT
Start: 2023-01-04

## 2023-01-04 NOTE — TELEPHONE ENCOUNTER
Pt called and asked if he can get a script sent to the pharmacy for a \"finger karime\" for diabetes, he said his dog ate his.  Please advice      [facility] in [place]    Last Visit Date: 12/14/2022   Next Visit Date: 2/15/2023

## 2023-01-25 DIAGNOSIS — Z79.4 TYPE 2 DIABETES MELLITUS WITH HYPERGLYCEMIA, WITH LONG-TERM CURRENT USE OF INSULIN (HCC): ICD-10-CM

## 2023-01-25 DIAGNOSIS — E11.65 TYPE 2 DIABETES MELLITUS WITH HYPERGLYCEMIA, WITH LONG-TERM CURRENT USE OF INSULIN (HCC): ICD-10-CM

## 2023-01-25 RX ORDER — DULAGLUTIDE 1.5 MG/.5ML
INJECTION, SOLUTION SUBCUTANEOUS
Qty: 4 ML | Refills: 0 | Status: SHIPPED | OUTPATIENT
Start: 2023-01-25

## 2023-01-25 RX ORDER — INSULIN LISPRO 100 [IU]/ML
INJECTION, SOLUTION INTRAVENOUS; SUBCUTANEOUS
Qty: 30 ML | Refills: 0 | Status: SHIPPED | OUTPATIENT
Start: 2023-01-25

## 2023-02-15 ENCOUNTER — OFFICE VISIT (OUTPATIENT)
Dept: PRIMARY CARE CLINIC | Age: 28
End: 2023-02-15

## 2023-02-15 VITALS
DIASTOLIC BLOOD PRESSURE: 84 MMHG | OXYGEN SATURATION: 97 % | RESPIRATION RATE: 16 BRPM | SYSTOLIC BLOOD PRESSURE: 120 MMHG | HEART RATE: 84 BPM | BODY MASS INDEX: 42.78 KG/M2 | HEIGHT: 71 IN | WEIGHT: 305.6 LBS

## 2023-02-15 DIAGNOSIS — E66.01 MORBID OBESITY DUE TO EXCESS CALORIES (HCC): ICD-10-CM

## 2023-02-15 DIAGNOSIS — Z79.4 TYPE 2 DIABETES MELLITUS WITH HYPERGLYCEMIA, WITH LONG-TERM CURRENT USE OF INSULIN (HCC): Primary | ICD-10-CM

## 2023-02-15 DIAGNOSIS — I10 ESSENTIAL HYPERTENSION: ICD-10-CM

## 2023-02-15 DIAGNOSIS — E11.65 TYPE 2 DIABETES MELLITUS WITH HYPERGLYCEMIA, WITH LONG-TERM CURRENT USE OF INSULIN (HCC): Primary | ICD-10-CM

## 2023-02-15 DIAGNOSIS — R79.89 LOW TESTOSTERONE IN MALE: ICD-10-CM

## 2023-02-15 LAB — HBA1C MFR BLD: 6.5 %

## 2023-02-15 RX ORDER — TADALAFIL 5 MG/1
5 TABLET ORAL DAILY
COMMUNITY
Start: 2023-02-04

## 2023-02-15 SDOH — ECONOMIC STABILITY: INCOME INSECURITY: HOW HARD IS IT FOR YOU TO PAY FOR THE VERY BASICS LIKE FOOD, HOUSING, MEDICAL CARE, AND HEATING?: NOT HARD AT ALL

## 2023-02-15 SDOH — ECONOMIC STABILITY: HOUSING INSECURITY
IN THE LAST 12 MONTHS, WAS THERE A TIME WHEN YOU DID NOT HAVE A STEADY PLACE TO SLEEP OR SLEPT IN A SHELTER (INCLUDING NOW)?: NO

## 2023-02-15 SDOH — ECONOMIC STABILITY: FOOD INSECURITY: WITHIN THE PAST 12 MONTHS, YOU WORRIED THAT YOUR FOOD WOULD RUN OUT BEFORE YOU GOT MONEY TO BUY MORE.: NEVER TRUE

## 2023-02-15 SDOH — ECONOMIC STABILITY: FOOD INSECURITY: WITHIN THE PAST 12 MONTHS, THE FOOD YOU BOUGHT JUST DIDN'T LAST AND YOU DIDN'T HAVE MONEY TO GET MORE.: NEVER TRUE

## 2023-02-15 ASSESSMENT — PATIENT HEALTH QUESTIONNAIRE - PHQ9
SUM OF ALL RESPONSES TO PHQ QUESTIONS 1-9: 0
SUM OF ALL RESPONSES TO PHQ QUESTIONS 1-9: 0
1. LITTLE INTEREST OR PLEASURE IN DOING THINGS: 0
SUM OF ALL RESPONSES TO PHQ QUESTIONS 1-9: 0
SUM OF ALL RESPONSES TO PHQ9 QUESTIONS 1 & 2: 0
2. FEELING DOWN, DEPRESSED OR HOPELESS: 0
SUM OF ALL RESPONSES TO PHQ QUESTIONS 1-9: 0

## 2023-02-16 RX ORDER — DULAGLUTIDE 3 MG/.5ML
3 INJECTION, SOLUTION SUBCUTANEOUS WEEKLY
Qty: 4 ADJUSTABLE DOSE PRE-FILLED PEN SYRINGE | Refills: 2 | Status: SHIPPED | OUTPATIENT
Start: 2023-02-16

## 2023-02-16 ASSESSMENT — ENCOUNTER SYMPTOMS
COUGH: 0
BACK PAIN: 0
NAUSEA: 0
RHINORRHEA: 0
ABDOMINAL PAIN: 0
SINUS PAIN: 0
SHORTNESS OF BREATH: 0
EYE PAIN: 0
CONSTIPATION: 0
DIARRHEA: 0
VOMITING: 0

## 2023-02-16 NOTE — PROGRESS NOTES
[address] [facility] CARE  [facility] [address] [facility] [address]  [address] [address]  Dept: [phone]  Dept Fax: [phone]    Jacquelyn Myers is a 28 y.o. male who presents today for his medical conditions/complaints as noted below. Chief Complaint   Patient presents with    Diabetes       HPI:     Patient presents to the office for routine follow-up. He has past medical history including hypertension, type 2 diabetes, obesity, obstructive sleep apnea, dyslipidemia, low testosterone. Patient has recently followed with urology. They have started him on tadalafil 5 mg daily. They are reassessing testosterone and if he remains low we will start him on TRT therapy. Otherwise, patient reports he is doing very well. He is tracking glucose continually. He has noticed gradual downward trend. He has reduced insulin use and has not used the last few days due to sugar being well controlled. He is compliant with Trulicity once weekly and metformin twice daily. No side effect. He is so far happy with his diabetic control. He is tolerating pravastatin for cholesterol. He is not having the aches and pains that he did associated with atorvastatin. Denies any lightheadedness, dizziness, shortness of breath, chest pain, leg edema, syncope. He has made minor adjustments to nutrition and lifestyle habits, but nothing significant. BP stable. Weight stable. Hemoglobin A1C (%)   Date Value   02/15/2023 6.5   11/15/2022 8.4   2022 7.6             ( goal A1C is < 7)   Microalb/Crt.  Ratio (mcg/mg creat)   Date Value   2022 6     LDL Cholesterol (mg/dL)   Date Value   2022 142 (H)   2020 66     LDL Calculated (mg/dL)   Date Value   2019 120.6   2019 77.8   10/22/2018 111.6       (goal LDL is <100)   AST (U/L)   Date Value   2022 24     ALT (U/L)   Date Value   2022 34     BUN (mg/dL)   Date Value   2022 15     BP Readings from Last 3 Encounters:   02/15/23 120/84   12/14/22 122/82   11/15/22 130/82          (goal 120/80)    Past Medical History:   Diagnosis Date    Essential hypertension 10/29/2019    Gastrointestinal hemorrhage with hematemesis     GERD (gastroesophageal reflux disease)     GI bleed 5/3/2016    Hyperlipidemia     Hypothyroidism       Past Surgical History:   Procedure Laterality Date    APPENDECTOMY  2013    ARM SURGERY Left 2006    left arm reconstruction - fracture    [place] OFFICE/OUTPT VISIT,PROCEDURE ONLY Left 6/21/2018    SHOULDER ARTHROSCOPY, OPEN CAPSULAR SHIFT REPAIR performed by Robbin Beckford MD at [address] ARTHROSCOPY Left 06/21/2018    with open capsular shift    UPPER GASTROINTESTINAL ENDOSCOPY  2014    Darek Keenan for hematemesis; found hiatal hernia    UPPER GASTROINTESTINAL ENDOSCOPY  05/03/2016    moderate gastritis       Family History   Problem Relation Age of Onset    High Blood Pressure Mother     Heart Attack Mother     High Blood Pressure Father     Heart Attack Maternal Grandmother     Heart Attack Maternal Grandfather     Diabetes Maternal Grandfather        Social History     Tobacco Use    Smoking status: Never    Smokeless tobacco: Former    Tobacco comments: MIKE Domingo RRT 5/3/2016   Substance Use Topics    Alcohol use:  Yes     Alcohol/week: 0.0 standard drinks     Comment: occasional      Current Outpatient Medications   Medication Sig Dispense Refill    Dulaglutide (TRULICITY) 3 BZ/0.9WW SOPN Inject 3 mg into the skin once a week 4 Adjustable Dose Pre-filled Pen Syringe 2    tadalafil (CIALIS) 5 MG tablet Take 5 mg by mouth daily      insulin lispro, 1 Unit Dial, (HUMALOG/ADMELOG) 100 UNIT/ML SOPN INJECT SUBCUTANEOUSLY BEFORE MEALS PER SLIDING SCALE(5 UNITS FOR EVERY 50 ABOVE 150) MAX UNITS PER DAY  30 mL 0    glucose monitoring (FREESTYLE FREEDOM) kit 1 kit by Does not apply route daily 1 kit 0    Continuous Blood Gluc Transmit (DEXCOM G6 TRANSMITTER) MISC Use with associated  and sensors for continuous glucose monitoring. 1 each 0    Continuous Blood Gluc Sensor (DEXCOM G6 SENSOR) MISC Apply every 10 days for continuous glucose monitoring. 3 each 2    pravastatin (PRAVACHOL) 20 MG tablet Take 1 tablet by mouth daily 30 tablet 2    metFORMIN (GLUCOPHAGE) 500 MG tablet Take 1 tablet by mouth 2 times daily (with meals) 60 tablet 2    Insulin Degludec (TRESIBA FLEXTOUCH) 200 UNIT/ML SOPN inject 70 units subcutaneously at bedtime 105 mL 2    benzonatate (TESSALON) 100 MG capsule TAKE 1 TO 2 CAPSULES BY MOUTH EVERY 8 HOURS AS NEEDED FOR COUGH      fluticasone (FLONASE) 50 MCG/ACT nasal spray 1 spray by Each Nostril route daily 16 g 0    lisinopril (PRINIVIL;ZESTRIL) 10 MG tablet Take 1 tablet by mouth daily 90 tablet 0    Continuous Blood Gluc  (DEXCOM G6 ) ALEXIA Use with associated Dexcom transmitter for continuous glucose monitoring. 1 each 0    blood glucose monitor kit and supplies 1 kit by Other route 2 times daily Test 4 times a day & as needed for symptoms of irregular blood glucose. 1 kit 0    Lancets MISC Monitor blood sugar 4 times daily and as needed. 100 each 3    blood glucose monitor strips Test 4 times a day & as needed for symptoms of irregular blood glucose. Dispense sufficient amount for indicated testing frequency plus additional to accommodate PRN testing needs. 100 strip 0    Lancets (ONETOUCH DELICA PLUS [id number]) MISC USE AS DIRECTED FOUR TIMES A  each 3    blood glucose monitor strips 1 strip by Other route 4 times daily Please dispense test strips compatible with the patient's glucometer True Metrix.  100 strip 3    Insulin Pen Needle 32G X 4 MM MISC 1 each by Does not apply route daily 100 each 3    glucose monitoring kit (FREESTYLE) monitoring kit 1 kit by Does not apply route daily 1 kit 0    acetaminophen 650 MG TABS Take 650 mg by mouth every 4 hours as needed 120 tablet 0     No current facility-administered medications for this visit. Allergies   Allergen Reactions    Cephalexin Hives    Sulfa Antibiotics Hives       Health Maintenance   Topic Date Due    COVID-19 Vaccine (1) Never done    Varicella vaccine (1 of 2 - 2-dose childhood series) Never done    Hepatitis B vaccine (1 of 3 - Risk 3-dose series) Never done    Diabetic retinal exam  08/12/2021    HIV screen  12/14/2027 (Originally 10/7/2009)    Diabetic Alb to Cr ratio (uACR) test  11/16/2023    Lipids  11/16/2023    GFR test (Diabetes, CKD 3-4, OR last GFR 15-59)  11/16/2023    Diabetic foot exam  12/14/2023    A1C test (Diabetic or Prediabetic)  02/15/2024    Depression Screen  02/15/2024    DTaP/Tdap/Td vaccine (3 - Td or Tdap) 05/10/2029    Flu vaccine  Completed    Pneumococcal 0-64 years Vaccine  Completed    Hepatitis C screen  Completed    Hepatitis A vaccine  Aged Out    Hib vaccine  Aged Out    Meningococcal (ACWY) vaccine  Aged Out       Subjective:      Review of Systems   Constitutional:  Positive for fatigue. Negative for chills and fever. HENT:  Negative for congestion, rhinorrhea and sinus pain. Eyes:  Negative for pain. Respiratory:  Negative for cough and shortness of breath. Cardiovascular:  Negative for chest pain and leg swelling. Gastrointestinal:  Negative for abdominal pain, constipation, diarrhea, nausea and vomiting. Genitourinary:  Negative for difficulty urinating, enuresis and testicular pain. Musculoskeletal:  Negative for arthralgias, back pain and myalgias. Skin:  Negative for rash. Neurological:  Negative for dizziness and headaches. Psychiatric/Behavioral:  Negative for confusion and sleep disturbance. The patient is not nervous/anxious. All other systems reviewed and are negative. Objective:     Physical Exam  Vitals and nursing note reviewed. Constitutional:       General: He is not in acute distress. Appearance: Normal appearance. He is obese. HENT:      Head: Normocephalic.       Mouth/Throat: Mouth: Mucous membranes are moist.   Eyes:      Extraocular Movements: Extraocular movements intact. Conjunctiva/sclera: Conjunctivae normal.      Pupils: Pupils are equal, round, and reactive to light. Cardiovascular:      Rate and Rhythm: Normal rate and regular rhythm. Pulses: Normal pulses. Heart sounds: Normal heart sounds. Pulmonary:      Effort: Pulmonary effort is normal. No respiratory distress. Breath sounds: Normal breath sounds. Musculoskeletal:      Cervical back: Normal range of motion. Right lower leg: No edema. Left lower leg: No edema. Lymphadenopathy:      Cervical: No cervical adenopathy. Skin:     General: Skin is warm. Capillary Refill: Capillary refill takes less than 2 seconds. Neurological:      General: No focal deficit present. Mental Status: He is alert and oriented to person, place, and time. Psychiatric:         Mood and Affect: Mood normal.         Behavior: Behavior normal.     /84 (Site: Left Upper Arm, Position: Sitting, Cuff Size: Large Adult)   Pulse 84   Resp 16   Ht 5' 11\" (1.803 m)   Wt (!) 305 lb 9.6 oz (138.6 kg)   SpO2 97%   BMI 42.62 kg/m²     Assessment:       ICD-10-CM    1. Type 2 diabetes mellitus with hyperglycemia, with long-term current use of insulin ([facility])  E11.65 POCT glycosylated hemoglobin (Hb A1C)    Z79.4 Dulaglutide (TRULICITY) 3 DJ/7.1QP SOPN      2. Essential hypertension  I10       3. Morbid obesity due to excess calories ([facility])  E66.01       4. Low testosterone in male  R79.89                Plan:      1. Type 2 diabetes continues to improve with A1c at 6.5. Plan to adjust Trulicity to 3 mg weekly. He is to continue metformin 500 mg twice daily. Continue close glucose monitoring and if necessary insulin. We will continue to wean insulin until its not necessary. Discussed the importance of nutrition for diabetic management. 2.  Hypertension is stable.   Continue lisinopril 10 mg daily.  3.  I strongly encourage patient on weight loss for management of chronic medical conditions and reducing risk. 4.  Continue following with urology for management of low testosterone. Return in about 3 months (around 5/15/2023) for medication recheck, diabetes, blood pressure. Orders Placed This Encounter   Procedures    POCT glycosylated hemoglobin (Hb A1C)         Patient given educational materials - see patient instructions. Discussed use, benefit, and side effects of prescribed medications. All patient questions answered. Pt voiced understanding. Reviewed health maintenance. Instructed to continue current medications, diet and exercise. Patient agreed with treatment plan. Follow up as directed.         Electronically signed by Nancy Smith PA-C on 2/16/2023 at 8:51 AM

## 2023-03-03 DIAGNOSIS — I10 ESSENTIAL HYPERTENSION: ICD-10-CM

## 2023-03-03 RX ORDER — LISINOPRIL 10 MG/1
TABLET ORAL
Qty: 90 TABLET | Refills: 0 | Status: SHIPPED | OUTPATIENT
Start: 2023-03-03

## 2023-03-10 DIAGNOSIS — E11.65 TYPE 2 DIABETES MELLITUS WITH HYPERGLYCEMIA, WITH LONG-TERM CURRENT USE OF INSULIN (HCC): ICD-10-CM

## 2023-03-10 DIAGNOSIS — Z79.4 TYPE 2 DIABETES MELLITUS WITH HYPERGLYCEMIA, WITH LONG-TERM CURRENT USE OF INSULIN (HCC): ICD-10-CM

## 2023-04-02 DIAGNOSIS — E78.5 DYSLIPIDEMIA: ICD-10-CM

## 2023-04-03 RX ORDER — PRAVASTATIN SODIUM 20 MG
20 TABLET ORAL DAILY
Qty: 30 TABLET | Refills: 2 | Status: SHIPPED | OUTPATIENT
Start: 2023-04-03

## 2023-04-03 RX ORDER — PRAVASTATIN SODIUM 20 MG
TABLET ORAL
Qty: 30 TABLET | Refills: 0 | OUTPATIENT
Start: 2023-04-03

## 2023-04-28 DIAGNOSIS — E11.65 TYPE 2 DIABETES MELLITUS WITH HYPERGLYCEMIA, WITH LONG-TERM CURRENT USE OF INSULIN (HCC): ICD-10-CM

## 2023-04-28 DIAGNOSIS — Z79.4 TYPE 2 DIABETES MELLITUS WITH HYPERGLYCEMIA, WITH LONG-TERM CURRENT USE OF INSULIN (HCC): ICD-10-CM

## 2023-04-28 RX ORDER — DULAGLUTIDE 3 MG/.5ML
INJECTION, SOLUTION SUBCUTANEOUS
Qty: 4 ML | Refills: 0 | Status: SHIPPED | OUTPATIENT
Start: 2023-04-28

## 2023-05-08 LAB
BASOPHILS ABSOLUTE: NORMAL
BASOPHILS RELATIVE PERCENT: NORMAL
EOSINOPHILS ABSOLUTE: NORMAL
EOSINOPHILS RELATIVE PERCENT: NORMAL
HCT VFR BLD CALC: 46.1 % (ref 41–53)
HEMOGLOBIN: 15.3 G/DL (ref 13.5–17.5)
LYMPHOCYTES ABSOLUTE: NORMAL
LYMPHOCYTES RELATIVE PERCENT: NORMAL
MCH RBC QN AUTO: 28.4 PG
MCHC RBC AUTO-ENTMCNC: 33.2 G/DL
MCV RBC AUTO: 85.7 FL
MONOCYTES ABSOLUTE: NORMAL
MONOCYTES RELATIVE PERCENT: NORMAL
NEUTROPHILS ABSOLUTE: NORMAL
NEUTROPHILS RELATIVE PERCENT: NORMAL
PLATELET # BLD: 257 K/ΜL
PMV BLD AUTO: 9 FL
PROSTATE SPECIFIC ANTIGEN: 0.32 NG/ML
RBC # BLD: 5.38 10^6/ΜL
WBC # BLD: 5.6 10^3/ML

## 2023-05-09 DIAGNOSIS — E11.65 TYPE 2 DIABETES MELLITUS WITH HYPERGLYCEMIA, WITH LONG-TERM CURRENT USE OF INSULIN (HCC): ICD-10-CM

## 2023-05-09 DIAGNOSIS — Z79.4 TYPE 2 DIABETES MELLITUS WITH HYPERGLYCEMIA, WITH LONG-TERM CURRENT USE OF INSULIN (HCC): ICD-10-CM

## 2023-05-09 RX ORDER — PROCHLORPERAZINE 25 MG/1
SUPPOSITORY RECTAL
Qty: 1 EACH | Refills: 1 | Status: SHIPPED | OUTPATIENT
Start: 2023-05-09 | End: 2023-06-02

## 2023-05-16 DIAGNOSIS — I10 ESSENTIAL HYPERTENSION: ICD-10-CM

## 2023-05-16 RX ORDER — LISINOPRIL 10 MG/1
10 TABLET ORAL DAILY
Qty: 90 TABLET | Refills: 0 | Status: SHIPPED | OUTPATIENT
Start: 2023-05-16

## 2023-06-02 ENCOUNTER — OFFICE VISIT (OUTPATIENT)
Dept: PRIMARY CARE CLINIC | Age: 28
End: 2023-06-02
Payer: COMMERCIAL

## 2023-06-02 VITALS
HEIGHT: 71 IN | BODY MASS INDEX: 40.49 KG/M2 | OXYGEN SATURATION: 99 % | WEIGHT: 289.2 LBS | DIASTOLIC BLOOD PRESSURE: 80 MMHG | HEART RATE: 97 BPM | SYSTOLIC BLOOD PRESSURE: 124 MMHG

## 2023-06-02 DIAGNOSIS — I10 ESSENTIAL HYPERTENSION: ICD-10-CM

## 2023-06-02 DIAGNOSIS — E11.65 TYPE 2 DIABETES MELLITUS WITH HYPERGLYCEMIA, WITH LONG-TERM CURRENT USE OF INSULIN (HCC): Primary | ICD-10-CM

## 2023-06-02 DIAGNOSIS — R79.89 LOW TESTOSTERONE IN MALE: ICD-10-CM

## 2023-06-02 DIAGNOSIS — Z79.4 TYPE 2 DIABETES MELLITUS WITH HYPERGLYCEMIA, WITH LONG-TERM CURRENT USE OF INSULIN (HCC): Primary | ICD-10-CM

## 2023-06-02 LAB — HBA1C MFR BLD: 6.5 %

## 2023-06-02 PROCEDURE — G8427 DOCREV CUR MEDS BY ELIG CLIN: HCPCS | Performed by: PHYSICIAN ASSISTANT

## 2023-06-02 PROCEDURE — 3078F DIAST BP <80 MM HG: CPT | Performed by: PHYSICIAN ASSISTANT

## 2023-06-02 PROCEDURE — 2022F DILAT RTA XM EVC RTNOPTHY: CPT | Performed by: PHYSICIAN ASSISTANT

## 2023-06-02 PROCEDURE — 1036F TOBACCO NON-USER: CPT | Performed by: PHYSICIAN ASSISTANT

## 2023-06-02 PROCEDURE — 83036 HEMOGLOBIN GLYCOSYLATED A1C: CPT | Performed by: PHYSICIAN ASSISTANT

## 2023-06-02 PROCEDURE — 3044F HG A1C LEVEL LT 7.0%: CPT | Performed by: PHYSICIAN ASSISTANT

## 2023-06-02 PROCEDURE — 99214 OFFICE O/P EST MOD 30 MIN: CPT | Performed by: PHYSICIAN ASSISTANT

## 2023-06-02 PROCEDURE — 3074F SYST BP LT 130 MM HG: CPT | Performed by: PHYSICIAN ASSISTANT

## 2023-06-02 PROCEDURE — G8417 CALC BMI ABV UP PARAM F/U: HCPCS | Performed by: PHYSICIAN ASSISTANT

## 2023-06-09 ASSESSMENT — ENCOUNTER SYMPTOMS
CONSTIPATION: 0
DIARRHEA: 0
ABDOMINAL PAIN: 0
NAUSEA: 0
BACK PAIN: 0
SINUS PAIN: 0
SHORTNESS OF BREATH: 0
EYE PAIN: 0
COUGH: 0
VOMITING: 0
RHINORRHEA: 0

## 2023-06-12 DIAGNOSIS — E11.65 TYPE 2 DIABETES MELLITUS WITH HYPERGLYCEMIA, WITH LONG-TERM CURRENT USE OF INSULIN (HCC): ICD-10-CM

## 2023-06-12 DIAGNOSIS — Z79.4 TYPE 2 DIABETES MELLITUS WITH HYPERGLYCEMIA, WITH LONG-TERM CURRENT USE OF INSULIN (HCC): ICD-10-CM

## 2023-06-13 RX ORDER — DULAGLUTIDE 3 MG/.5ML
INJECTION, SOLUTION SUBCUTANEOUS
Qty: 4 ML | Refills: 2 | Status: SHIPPED | OUTPATIENT
Start: 2023-06-13

## 2023-06-14 DIAGNOSIS — E78.5 DYSLIPIDEMIA: ICD-10-CM

## 2023-06-14 RX ORDER — PRAVASTATIN SODIUM 20 MG
TABLET ORAL
Qty: 30 TABLET | Refills: 2 | Status: SHIPPED | OUTPATIENT
Start: 2023-06-14

## 2023-07-08 DIAGNOSIS — E11.65 TYPE 2 DIABETES MELLITUS WITH HYPERGLYCEMIA, WITH LONG-TERM CURRENT USE OF INSULIN (HCC): ICD-10-CM

## 2023-07-08 DIAGNOSIS — Z79.4 TYPE 2 DIABETES MELLITUS WITH HYPERGLYCEMIA, WITH LONG-TERM CURRENT USE OF INSULIN (HCC): ICD-10-CM

## 2023-07-14 ENCOUNTER — HOSPITAL ENCOUNTER (OUTPATIENT)
Age: 28
Setting detail: SPECIMEN
Discharge: HOME | End: 2023-07-14

## 2023-07-19 LAB — SURGICAL PATHOLOGY REPORT: NORMAL

## 2023-08-04 DIAGNOSIS — I10 ESSENTIAL HYPERTENSION: ICD-10-CM

## 2023-08-04 RX ORDER — LISINOPRIL 10 MG/1
TABLET ORAL
Qty: 90 TABLET | Refills: 0 | Status: SHIPPED | OUTPATIENT
Start: 2023-08-04

## 2023-08-07 LAB
BASOPHILS ABSOLUTE: NORMAL
BASOPHILS RELATIVE PERCENT: NORMAL
EOSINOPHILS ABSOLUTE: NORMAL
EOSINOPHILS RELATIVE PERCENT: NORMAL
HCT VFR BLD CALC: 47.3 % (ref 41–53)
HEMOGLOBIN: 15.8 G/DL (ref 13.5–17.5)
LYMPHOCYTES ABSOLUTE: NORMAL
LYMPHOCYTES RELATIVE PERCENT: NORMAL
MCH RBC QN AUTO: 28.7 PG
MCHC RBC AUTO-ENTMCNC: 33.4 G/DL
MCV RBC AUTO: 86 FL
MONOCYTES ABSOLUTE: NORMAL
MONOCYTES RELATIVE PERCENT: NORMAL
NEUTROPHILS ABSOLUTE: NORMAL
NEUTROPHILS RELATIVE PERCENT: NORMAL
PLATELET # BLD: 289 K/ΜL
PMV BLD AUTO: 9 FL
RBC # BLD: NORMAL 10*6/UL
WBC # BLD: 8 10^3/ML

## 2023-08-11 RX ORDER — TESTOSTERONE CYPIONATE 200 MG/ML
INJECTION, SOLUTION INTRAMUSCULAR
Qty: 2 ML | Refills: 0 | OUTPATIENT
Start: 2023-08-11

## 2023-08-28 DIAGNOSIS — Z79.4 TYPE 2 DIABETES MELLITUS WITH HYPERGLYCEMIA, WITH LONG-TERM CURRENT USE OF INSULIN (HCC): ICD-10-CM

## 2023-08-28 DIAGNOSIS — E11.65 TYPE 2 DIABETES MELLITUS WITH HYPERGLYCEMIA, WITH LONG-TERM CURRENT USE OF INSULIN (HCC): ICD-10-CM

## 2023-08-28 RX ORDER — DULAGLUTIDE 3 MG/.5ML
INJECTION, SOLUTION SUBCUTANEOUS
Qty: 4 ML | Refills: 2 | Status: SHIPPED | OUTPATIENT
Start: 2023-08-28 | End: 2023-09-08 | Stop reason: DRUGHIGH

## 2023-09-03 DIAGNOSIS — E11.65 TYPE 2 DIABETES MELLITUS WITH HYPERGLYCEMIA, WITH LONG-TERM CURRENT USE OF INSULIN (HCC): ICD-10-CM

## 2023-09-03 DIAGNOSIS — Z79.4 TYPE 2 DIABETES MELLITUS WITH HYPERGLYCEMIA, WITH LONG-TERM CURRENT USE OF INSULIN (HCC): ICD-10-CM

## 2023-09-05 DIAGNOSIS — E11.65 TYPE 2 DIABETES MELLITUS WITH HYPERGLYCEMIA, WITH LONG-TERM CURRENT USE OF INSULIN (HCC): ICD-10-CM

## 2023-09-05 DIAGNOSIS — Z79.4 TYPE 2 DIABETES MELLITUS WITH HYPERGLYCEMIA, WITH LONG-TERM CURRENT USE OF INSULIN (HCC): ICD-10-CM

## 2023-09-05 RX ORDER — PROCHLORPERAZINE 25 MG/1
SUPPOSITORY RECTAL
Qty: 3 EACH | Refills: 2 | Status: SHIPPED | OUTPATIENT
Start: 2023-09-05

## 2023-09-05 RX ORDER — PROCHLORPERAZINE 25 MG/1
SUPPOSITORY RECTAL
Qty: 3 EACH | Refills: 0 | Status: SHIPPED | OUTPATIENT
Start: 2023-09-05

## 2023-09-08 ENCOUNTER — OFFICE VISIT (OUTPATIENT)
Dept: PRIMARY CARE CLINIC | Age: 28
End: 2023-09-08
Payer: COMMERCIAL

## 2023-09-08 VITALS
HEART RATE: 101 BPM | BODY MASS INDEX: 38.64 KG/M2 | HEIGHT: 71 IN | RESPIRATION RATE: 16 BRPM | DIASTOLIC BLOOD PRESSURE: 84 MMHG | SYSTOLIC BLOOD PRESSURE: 124 MMHG | WEIGHT: 276 LBS | OXYGEN SATURATION: 97 %

## 2023-09-08 DIAGNOSIS — R79.89 LOW TESTOSTERONE IN MALE: ICD-10-CM

## 2023-09-08 DIAGNOSIS — Z79.4 TYPE 2 DIABETES MELLITUS WITH HYPERGLYCEMIA, WITH LONG-TERM CURRENT USE OF INSULIN (HCC): Primary | ICD-10-CM

## 2023-09-08 DIAGNOSIS — E11.65 TYPE 2 DIABETES MELLITUS WITH HYPERGLYCEMIA, WITH LONG-TERM CURRENT USE OF INSULIN (HCC): Primary | ICD-10-CM

## 2023-09-08 DIAGNOSIS — I10 ESSENTIAL HYPERTENSION: ICD-10-CM

## 2023-09-08 DIAGNOSIS — E66.01 CLASS 2 SEVERE OBESITY WITH SERIOUS COMORBIDITY AND BODY MASS INDEX (BMI) OF 38.0 TO 38.9 IN ADULT, UNSPECIFIED OBESITY TYPE (HCC): ICD-10-CM

## 2023-09-08 PROBLEM — E66.812 CLASS 2 SEVERE OBESITY WITH SERIOUS COMORBIDITY AND BODY MASS INDEX (BMI) OF 38.0 TO 38.9 IN ADULT: Status: ACTIVE | Noted: 2023-09-08

## 2023-09-08 PROBLEM — M25.312 INSTABILITY OF LEFT SHOULDER JOINT: Status: ACTIVE | Noted: 2023-02-14

## 2023-09-08 PROBLEM — L60.0 INGROWN NAIL OF GREAT TOE: Status: ACTIVE | Noted: 2022-07-26

## 2023-09-08 PROBLEM — E66.9 OBESITY WITH BODY MASS INDEX (BMI) OF 35.0 TO 39.9 WITHOUT COMORBIDITY: Status: ACTIVE | Noted: 2023-09-08

## 2023-09-08 LAB — HBA1C MFR BLD: 7.5 %

## 2023-09-08 PROCEDURE — 1036F TOBACCO NON-USER: CPT | Performed by: PHYSICIAN ASSISTANT

## 2023-09-08 PROCEDURE — 3079F DIAST BP 80-89 MM HG: CPT | Performed by: PHYSICIAN ASSISTANT

## 2023-09-08 PROCEDURE — 3051F HG A1C>EQUAL 7.0%<8.0%: CPT | Performed by: PHYSICIAN ASSISTANT

## 2023-09-08 PROCEDURE — G8427 DOCREV CUR MEDS BY ELIG CLIN: HCPCS | Performed by: PHYSICIAN ASSISTANT

## 2023-09-08 PROCEDURE — 3074F SYST BP LT 130 MM HG: CPT | Performed by: PHYSICIAN ASSISTANT

## 2023-09-08 PROCEDURE — 83036 HEMOGLOBIN GLYCOSYLATED A1C: CPT | Performed by: PHYSICIAN ASSISTANT

## 2023-09-08 PROCEDURE — 2022F DILAT RTA XM EVC RTNOPTHY: CPT | Performed by: PHYSICIAN ASSISTANT

## 2023-09-08 PROCEDURE — 99214 OFFICE O/P EST MOD 30 MIN: CPT | Performed by: PHYSICIAN ASSISTANT

## 2023-09-08 PROCEDURE — G8417 CALC BMI ABV UP PARAM F/U: HCPCS | Performed by: PHYSICIAN ASSISTANT

## 2023-09-08 RX ORDER — DULAGLUTIDE 4.5 MG/.5ML
4.5 INJECTION, SOLUTION SUBCUTANEOUS WEEKLY
Qty: 4 ADJUSTABLE DOSE PRE-FILLED PEN SYRINGE | Refills: 2 | Status: SHIPPED | OUTPATIENT
Start: 2023-09-08

## 2023-09-08 ASSESSMENT — PATIENT HEALTH QUESTIONNAIRE - PHQ9
SUM OF ALL RESPONSES TO PHQ9 QUESTIONS 1 & 2: 0
2. FEELING DOWN, DEPRESSED OR HOPELESS: 0
SUM OF ALL RESPONSES TO PHQ QUESTIONS 1-9: 0
1. LITTLE INTEREST OR PLEASURE IN DOING THINGS: 0
SUM OF ALL RESPONSES TO PHQ QUESTIONS 1-9: 0

## 2023-09-08 ASSESSMENT — ENCOUNTER SYMPTOMS
EYE PAIN: 0
BACK PAIN: 0
CONSTIPATION: 0
VOMITING: 0
NAUSEA: 0
ABDOMINAL PAIN: 0
SINUS PAIN: 0
SHORTNESS OF BREATH: 0
COUGH: 0
RHINORRHEA: 0
DIARRHEA: 0

## 2023-09-08 NOTE — PROGRESS NOTES
Date Due    COVID-19 Vaccine (1) Never done    Varicella vaccine (1 of 2 - 2-dose childhood series) Never done    Hepatitis B vaccine (1 of 3 - Risk 3-dose series) Never done    Diabetic retinal exam  08/12/2021    Flu vaccine (1) 08/01/2023    HIV screen  12/14/2027 (Originally 10/7/2009)    Diabetic Alb to Cr ratio (uACR) test  11/16/2023    Lipids  11/16/2023    GFR test (Diabetes, CKD 3-4, OR last GFR 15-59)  11/16/2023    Diabetic foot exam  12/14/2023    A1C test (Diabetic or Prediabetic)  09/08/2024    Depression Screen  09/08/2024    DTaP/Tdap/Td vaccine (3 - Td or Tdap) 05/10/2029    Pneumococcal 0-64 years Vaccine  Completed    Hepatitis C screen  Completed    Hepatitis A vaccine  Aged Out    Hib vaccine  Aged Out    Meningococcal (ACWY) vaccine  Aged Out       Subjective:      Review of Systems   Constitutional:  Negative for chills, fatigue and fever. HENT:  Negative for congestion, rhinorrhea and sinus pain. Eyes:  Negative for pain. Respiratory:  Negative for cough and shortness of breath. Cardiovascular:  Negative for chest pain and leg swelling. Gastrointestinal:  Negative for abdominal pain, constipation, diarrhea, nausea and vomiting. Genitourinary:  Positive for impotence. Negative for difficulty urinating, enuresis and testicular pain. Musculoskeletal:  Negative for arthralgias, back pain and myalgias. Skin:  Negative for rash. Neurological:  Negative for dizziness and headaches. Psychiatric/Behavioral:  Negative for confusion and sleep disturbance. The patient is not nervous/anxious. All other systems reviewed and are negative. Objective:     Physical Exam  Vitals and nursing note reviewed. Constitutional:       General: He is not in acute distress. Appearance: Normal appearance. He is obese. HENT:      Head: Normocephalic. Mouth/Throat:      Mouth: Mucous membranes are moist.   Eyes:      Extraocular Movements: Extraocular movements intact.

## 2023-09-13 DIAGNOSIS — E78.5 DYSLIPIDEMIA: ICD-10-CM

## 2023-09-13 RX ORDER — PRAVASTATIN SODIUM 20 MG
TABLET ORAL
Qty: 30 TABLET | Refills: 0 | Status: SHIPPED | OUTPATIENT
Start: 2023-09-13

## 2023-10-02 DIAGNOSIS — Z79.4 TYPE 2 DIABETES MELLITUS WITH HYPERGLYCEMIA, WITH LONG-TERM CURRENT USE OF INSULIN (HCC): ICD-10-CM

## 2023-10-02 DIAGNOSIS — E11.65 TYPE 2 DIABETES MELLITUS WITH HYPERGLYCEMIA, WITH LONG-TERM CURRENT USE OF INSULIN (HCC): ICD-10-CM

## 2023-10-02 RX ORDER — PROCHLORPERAZINE 25 MG/1
SUPPOSITORY RECTAL
Qty: 3 EACH | Refills: 2 | Status: SHIPPED | OUTPATIENT
Start: 2023-10-02

## 2023-10-30 DIAGNOSIS — E78.5 DYSLIPIDEMIA: ICD-10-CM

## 2023-10-30 RX ORDER — PRAVASTATIN SODIUM 20 MG
TABLET ORAL
Qty: 30 TABLET | Refills: 2 | Status: SHIPPED | OUTPATIENT
Start: 2023-10-30

## 2023-11-03 DIAGNOSIS — I10 ESSENTIAL HYPERTENSION: ICD-10-CM

## 2023-11-03 RX ORDER — LISINOPRIL 10 MG/1
TABLET ORAL
Qty: 90 TABLET | Refills: 0 | Status: SHIPPED | OUTPATIENT
Start: 2023-11-03

## 2023-11-24 DIAGNOSIS — Z79.4 TYPE 2 DIABETES MELLITUS WITH HYPERGLYCEMIA, WITH LONG-TERM CURRENT USE OF INSULIN (HCC): ICD-10-CM

## 2023-11-24 DIAGNOSIS — E11.65 TYPE 2 DIABETES MELLITUS WITH HYPERGLYCEMIA, WITH LONG-TERM CURRENT USE OF INSULIN (HCC): ICD-10-CM

## 2023-11-24 RX ORDER — DULAGLUTIDE 4.5 MG/.5ML
INJECTION, SOLUTION SUBCUTANEOUS
Qty: 4 ML | Refills: 0 | Status: SHIPPED | OUTPATIENT
Start: 2023-11-24

## 2023-12-31 DIAGNOSIS — F41.8 MIXED ANXIETY AND DEPRESSIVE DISORDER: ICD-10-CM

## 2023-12-31 DIAGNOSIS — E11.65 TYPE 2 DIABETES MELLITUS WITH HYPERGLYCEMIA, WITH LONG-TERM CURRENT USE OF INSULIN (HCC): ICD-10-CM

## 2023-12-31 DIAGNOSIS — Z79.4 TYPE 2 DIABETES MELLITUS WITH HYPERGLYCEMIA, WITH LONG-TERM CURRENT USE OF INSULIN (HCC): ICD-10-CM

## 2024-01-02 ENCOUNTER — OFFICE VISIT (OUTPATIENT)
Dept: FAMILY MEDICINE CLINIC | Age: 29
End: 2024-01-02
Payer: COMMERCIAL

## 2024-01-02 VITALS
HEART RATE: 87 BPM | SYSTOLIC BLOOD PRESSURE: 128 MMHG | TEMPERATURE: 98.6 F | DIASTOLIC BLOOD PRESSURE: 76 MMHG | WEIGHT: 264 LBS | OXYGEN SATURATION: 96 % | BODY MASS INDEX: 36.82 KG/M2

## 2024-01-02 DIAGNOSIS — J01.90 ACUTE BACTERIAL SINUSITIS: Primary | ICD-10-CM

## 2024-01-02 DIAGNOSIS — B96.89 ACUTE BACTERIAL SINUSITIS: Primary | ICD-10-CM

## 2024-01-02 DIAGNOSIS — R05.1 ACUTE COUGH: ICD-10-CM

## 2024-01-02 DIAGNOSIS — R05.8 POST-VIRAL COUGH SYNDROME: ICD-10-CM

## 2024-01-02 PROCEDURE — G8427 DOCREV CUR MEDS BY ELIG CLIN: HCPCS | Performed by: NURSE PRACTITIONER

## 2024-01-02 PROCEDURE — G8417 CALC BMI ABV UP PARAM F/U: HCPCS | Performed by: NURSE PRACTITIONER

## 2024-01-02 PROCEDURE — 1036F TOBACCO NON-USER: CPT | Performed by: NURSE PRACTITIONER

## 2024-01-02 PROCEDURE — G8482 FLU IMMUNIZE ORDER/ADMIN: HCPCS | Performed by: NURSE PRACTITIONER

## 2024-01-02 PROCEDURE — 99213 OFFICE O/P EST LOW 20 MIN: CPT | Performed by: NURSE PRACTITIONER

## 2024-01-02 PROCEDURE — 3078F DIAST BP <80 MM HG: CPT | Performed by: NURSE PRACTITIONER

## 2024-01-02 PROCEDURE — 3074F SYST BP LT 130 MM HG: CPT | Performed by: NURSE PRACTITIONER

## 2024-01-02 RX ORDER — LORATADINE 10 MG/1
TABLET ORAL DAILY
Qty: 30 TABLET | Refills: 0 | Status: SHIPPED | OUTPATIENT
Start: 2024-01-02

## 2024-01-02 RX ORDER — ESCITALOPRAM OXALATE 10 MG/1
10 TABLET ORAL DAILY
Qty: 30 TABLET | Refills: 0 | Status: SHIPPED | OUTPATIENT
Start: 2024-01-02

## 2024-01-02 RX ORDER — DEXTROMETHORPHAN HYDROBROMIDE AND PROMETHAZINE HYDROCHLORIDE 15; 6.25 MG/5ML; MG/5ML
5 SYRUP ORAL 3 TIMES DAILY PRN
Qty: 150 ML | Refills: 0 | Status: SHIPPED | OUTPATIENT
Start: 2024-01-02 | End: 2024-01-12

## 2024-01-02 RX ORDER — AMOXICILLIN AND CLAVULANATE POTASSIUM 875; 125 MG/1; MG/1
1 TABLET, FILM COATED ORAL 2 TIMES DAILY
Qty: 20 TABLET | Refills: 0 | Status: SHIPPED | OUTPATIENT
Start: 2024-01-02 | End: 2024-01-12

## 2024-01-02 ASSESSMENT — PATIENT HEALTH QUESTIONNAIRE - PHQ9
SUM OF ALL RESPONSES TO PHQ QUESTIONS 1-9: 0
SUM OF ALL RESPONSES TO PHQ QUESTIONS 1-9: 0
1. LITTLE INTEREST OR PLEASURE IN DOING THINGS: 0
8. MOVING OR SPEAKING SO SLOWLY THAT OTHER PEOPLE COULD HAVE NOTICED. OR THE OPPOSITE, BEING SO FIGETY OR RESTLESS THAT YOU HAVE BEEN MOVING AROUND A LOT MORE THAN USUAL: 0
2. FEELING DOWN, DEPRESSED OR HOPELESS: 0
6. FEELING BAD ABOUT YOURSELF - OR THAT YOU ARE A FAILURE OR HAVE LET YOURSELF OR YOUR FAMILY DOWN: 0
7. TROUBLE CONCENTRATING ON THINGS, SUCH AS READING THE NEWSPAPER OR WATCHING TELEVISION: 0
SUM OF ALL RESPONSES TO PHQ QUESTIONS 1-9: 0
SUM OF ALL RESPONSES TO PHQ9 QUESTIONS 1 & 2: 0
SUM OF ALL RESPONSES TO PHQ QUESTIONS 1-9: 0
4. FEELING TIRED OR HAVING LITTLE ENERGY: 0
3. TROUBLE FALLING OR STAYING ASLEEP: 0
10. IF YOU CHECKED OFF ANY PROBLEMS, HOW DIFFICULT HAVE THESE PROBLEMS MADE IT FOR YOU TO DO YOUR WORK, TAKE CARE OF THINGS AT HOME, OR GET ALONG WITH OTHER PEOPLE: 0
9. THOUGHTS THAT YOU WOULD BE BETTER OFF DEAD, OR OF HURTING YOURSELF: 0
5. POOR APPETITE OR OVEREATING: 0

## 2024-01-02 ASSESSMENT — ENCOUNTER SYMPTOMS
VOMITING: 0
NAUSEA: 0
EYE REDNESS: 0
EYE PAIN: 0
SINUS PAIN: 1
SHORTNESS OF BREATH: 0
SINUS PRESSURE: 1
COUGH: 1

## 2024-01-02 NOTE — TELEPHONE ENCOUNTER
Jaya Cruz is calling to request a refill on the following medication(s):    Last Visit Date (If Applicable):  12/13/2023    Next Visit Date:    1/10/2024    Medication Request:  Requested Prescriptions     Pending Prescriptions Disp Refills    loratadine (CLARITIN) 10 MG tablet [Pharmacy Med Name: ALLERGY RELIEF (TOMI) 10MG TAB] 30 tablet 0     Sig: Take 1 tablet by mouth once daily

## 2024-01-02 NOTE — TELEPHONE ENCOUNTER
Jaya Cruz is calling to request a refill on the following medication(s):    Last Visit Date (If Applicable):  12/13/2023    Next Visit Date:    1/2/2024    Medication Request:  Requested Prescriptions     Pending Prescriptions Disp Refills    escitalopram (LEXAPRO) 10 MG tablet [Pharmacy Med Name: Escitalopram Oxalate 10 MG Oral Tablet] 30 tablet 0     Sig: Take 1 tablet by mouth once daily    metFORMIN (GLUCOPHAGE) 500 MG tablet [Pharmacy Med Name: metFORMIN HCl 500 MG Oral Tablet] 60 tablet 0     Sig: TAKE 1 TABLET BY MOUTH TWICE DAILY WITH MEALS

## 2024-01-02 NOTE — PROGRESS NOTES
General: He is not in acute distress.     Appearance: Normal appearance.   HENT:      Head: Normocephalic and atraumatic.      Right Ear: Tympanic membrane and ear canal normal.      Left Ear: Tympanic membrane and ear canal normal.      Nose: Congestion present.      Right Sinus: Maxillary sinus tenderness present.      Left Sinus: Maxillary sinus tenderness present.      Mouth/Throat:      Lips: Pink.      Mouth: Mucous membranes are moist.      Pharynx: Oropharynx is clear. Uvula midline.   Eyes:      Extraocular Movements: Extraocular movements intact.      Conjunctiva/sclera: Conjunctivae normal.      Pupils: Pupils are equal, round, and reactive to light.   Cardiovascular:      Rate and Rhythm: Normal rate and regular rhythm.      Pulses: Normal pulses.   Pulmonary:      Effort: Pulmonary effort is normal. No respiratory distress.      Breath sounds: Normal breath sounds.   Abdominal:      General: Bowel sounds are normal.      Palpations: Abdomen is soft.   Musculoskeletal:         General: Normal range of motion.      Cervical back: Normal range of motion and neck supple.   Skin:     General: Skin is warm and dry.      Capillary Refill: Capillary refill takes less than 2 seconds.      Coloration: Skin is not pale.   Neurological:      Mental Status: He is alert and oriented to person, place, and time.      Gait: Gait normal.   Psychiatric:         Mood and Affect: Mood normal.         Thought Content: Thought content normal.           MEDICAL DECISION MAKING Assessment/Plan:     Jaya was seen today for cough, headache and pharyngitis.    Diagnoses and all orders for this visit:    Acute bacterial sinusitis  -     amoxicillin-clavulanate (AUGMENTIN) 875-125 MG per tablet; Take 1 tablet by mouth 2 times daily for 10 days  -     promethazine-dextromethorphan (PROMETHAZINE-DM) 6.25-15 MG/5ML syrup; Take 5 mLs by mouth 3 times daily as needed for Cough    Acute cough  -     promethazine-dextromethorphan

## 2024-01-05 DIAGNOSIS — Z79.4 TYPE 2 DIABETES MELLITUS WITH HYPERGLYCEMIA, WITH LONG-TERM CURRENT USE OF INSULIN (HCC): ICD-10-CM

## 2024-01-05 DIAGNOSIS — E11.65 TYPE 2 DIABETES MELLITUS WITH HYPERGLYCEMIA, WITH LONG-TERM CURRENT USE OF INSULIN (HCC): ICD-10-CM

## 2024-01-05 RX ORDER — PROCHLORPERAZINE 25 MG/1
SUPPOSITORY RECTAL
Qty: 3 EACH | Refills: 0 | Status: SHIPPED | OUTPATIENT
Start: 2024-01-05

## 2024-01-13 DIAGNOSIS — I10 ESSENTIAL HYPERTENSION: ICD-10-CM

## 2024-01-15 RX ORDER — LISINOPRIL 10 MG/1
TABLET ORAL
Qty: 90 TABLET | Refills: 0 | Status: SHIPPED | OUTPATIENT
Start: 2024-01-15

## 2024-01-24 NOTE — PROGRESS NOTES
lispro (HUMALOG KWIKPEN) 100 UNIT/ML pen subcutaneously before meals ; per sliding scale - 5 units for every 50 above 150 5 pen 3    insulin glargine (TOUJEO SOLOSTAR) 300 UNIT/ML injection pen Inject 75 Units into the skin nightly Increase toujeo to 76 units nightly (Patient taking differently: Inject 76 Units into the skin nightly ) 15 pen 3    metFORMIN (GLUCOPHAGE-XR) 500 MG extended release tablet take 1 tablet by mouth every morning WITH BREAKFAST 90 tablet 1    empagliflozin (JARDIANCE) 25 MG tablet Take 25 mg by mouth daily 90 tablet 3    lisinopril (PRINIVIL;ZESTRIL) 10 MG tablet Take 1 tablet by mouth daily 90 tablet 3    pantoprazole (PROTONIX) 40 MG tablet Take 1 tablet by mouth daily 90 tablet 3    dapagliflozin (FARXIGA) 10 MG tablet Take 10 mg by mouth      atorvastatin (LIPITOR) 40 MG tablet Take 1 tablet by mouth daily 30 tablet 5    acetaminophen 650 MG TABS Take 650 mg by mouth every 4 hours as needed 120 tablet 0    glucose blood VI test strips (ASCENSIA AUTODISC VI;ONE TOUCH ULTRA TEST VI) strip Check blood 3 times daily before meals 100 each 3    Insulin Pen Needle 32G X 4 MM MISC 1 each by Does not apply route daily 100 each 3    glucose monitoring kit (FREESTYLE) monitoring kit 1 kit by Does not apply route daily 1 kit 0     No current facility-administered medications for this visit.       Allergies   Allergen Reactions    Cephalexin Hives    Sulfa Antibiotics Hives       Health Maintenance   Topic Date Due    Diabetic retinal exam  10/07/2004    Varicella Vaccine (1 of 2 - 13+ 2-dose series) 10/07/2007    Hepatitis B Vaccine (1 of 3 - Risk 3-dose series) 10/07/2013    A1C test (Diabetic or Prediabetic)  07/22/2019    HIV screen  12/14/2027 (Originally 10/7/2009)    Flu vaccine (1) 09/01/2019    Diabetic microalbuminuria test  10/22/2019    Lipid screen  10/22/2019    Potassium monitoring  10/22/2019    Creatinine monitoring  10/22/2019    Diabetic foot exam
Refer to the Assessment tab to view/cancel completed assessment.

## 2024-01-25 DIAGNOSIS — Z79.4 TYPE 2 DIABETES MELLITUS WITH HYPERGLYCEMIA, WITH LONG-TERM CURRENT USE OF INSULIN (HCC): ICD-10-CM

## 2024-01-25 DIAGNOSIS — E11.65 TYPE 2 DIABETES MELLITUS WITH HYPERGLYCEMIA, WITH LONG-TERM CURRENT USE OF INSULIN (HCC): ICD-10-CM

## 2024-01-25 RX ORDER — DULAGLUTIDE 4.5 MG/.5ML
INJECTION, SOLUTION SUBCUTANEOUS
Qty: 4 ML | Refills: 0 | Status: SHIPPED | OUTPATIENT
Start: 2024-01-25

## 2024-02-08 DIAGNOSIS — Z79.4 TYPE 2 DIABETES MELLITUS WITH HYPERGLYCEMIA, WITH LONG-TERM CURRENT USE OF INSULIN (HCC): ICD-10-CM

## 2024-02-08 DIAGNOSIS — E11.65 TYPE 2 DIABETES MELLITUS WITH HYPERGLYCEMIA, WITH LONG-TERM CURRENT USE OF INSULIN (HCC): ICD-10-CM

## 2024-02-08 DIAGNOSIS — E78.5 DYSLIPIDEMIA: ICD-10-CM

## 2024-02-08 RX ORDER — PRAVASTATIN SODIUM 20 MG
TABLET ORAL
Qty: 30 TABLET | Refills: 3 | Status: SHIPPED | OUTPATIENT
Start: 2024-02-08

## 2024-02-18 DIAGNOSIS — F41.8 MIXED ANXIETY AND DEPRESSIVE DISORDER: ICD-10-CM

## 2024-02-19 DIAGNOSIS — R05.8 POST-VIRAL COUGH SYNDROME: ICD-10-CM

## 2024-02-19 RX ORDER — ESCITALOPRAM OXALATE 10 MG/1
10 TABLET ORAL DAILY
Qty: 30 TABLET | Refills: 0 | Status: SHIPPED | OUTPATIENT
Start: 2024-02-19

## 2024-02-19 RX ORDER — LORATADINE 10 MG/1
TABLET ORAL DAILY
Qty: 30 TABLET | Refills: 3 | Status: SHIPPED | OUTPATIENT
Start: 2024-02-19

## 2024-02-19 NOTE — TELEPHONE ENCOUNTER
Last visit: 12/13/23  Last Med refill: 1/2/24  Does patient have enough medication for 72 hours: Next Visit Date:  No future appointments.    Health Maintenance   Topic Date Due    Hepatitis B vaccine (1 of 3 - 3-dose series) Never done    COVID-19 Vaccine (1) Never done    Varicella vaccine (1 of 2 - 2-dose childhood series) Never done    Polio vaccine (2 of 3 - 4-dose series) 05/13/2000    Pneumococcal 0-64 years Vaccine (2 - PCV) 05/10/2020    Diabetic retinal exam  08/12/2021    Diabetic Alb to Cr ratio (uACR) test  11/16/2023    Lipids  11/16/2023    GFR test (Diabetes, CKD 3-4, OR last GFR 15-59)  11/16/2023    Diabetic foot exam  12/14/2023    HIV screen  12/14/2027 (Originally 10/7/2009)    A1C test (Diabetic or Prediabetic)  12/13/2024    Depression Monitoring  01/02/2025    DTaP/Tdap/Td vaccine (3 - Td or Tdap) 05/10/2029    Flu vaccine  Completed    Hepatitis C screen  Completed    Hepatitis A vaccine  Aged Out    Hib vaccine  Aged Out    HPV vaccine  Aged Out    Meningococcal (ACWY) vaccine  Aged Out    Depression Screen  Discontinued       Hemoglobin A1C (%)   Date Value   12/13/2023 7.9 (H)   09/08/2023 7.5 (H)   06/02/2023 6.5             ( goal A1C is < 7)   No components found for: \"LABMICR\"  LDL Cholesterol (mg/dL)   Date Value   11/16/2022 142 (H)   07/16/2020 66     LDL Calculated (mg/dL)   Date Value   07/30/2019 120.6   07/25/2019 77.8       (goal LDL is <100)   AST (U/L)   Date Value   11/16/2022 24     ALT (U/L)   Date Value   11/16/2022 34     BUN (mg/dL)   Date Value   11/16/2022 15     BP Readings from Last 3 Encounters:   01/02/24 128/76   12/13/23 122/82   09/08/23 124/84          (goal 120/80)    All Future Testing planned in [facility]  Lab Frequency Next Occurrence   Lipid Panel Once 12/13/2023   Comprehensive Metabolic Panel, Fasting Once 12/13/2023   TSH With Reflex Ft4 Once 12/13/2023   Microalbumin, Ur Once 12/13/2023               Patient Active Problem List:     Hypothyroidism

## 2024-02-19 NOTE — TELEPHONE ENCOUNTER
Last visit: 12/13/2023  Last Med refill: 01/2/24  Does patient have enough medication for 72 hours: Next Visit Date:  No future appointments.    Health Maintenance   Topic Date Due    Hepatitis B vaccine (1 of 3 - 3-dose series) Never done    COVID-19 Vaccine (1) Never done    Varicella vaccine (1 of 2 - 2-dose childhood series) Never done    Polio vaccine (2 of 3 - 4-dose series) 05/13/2000    Pneumococcal 0-64 years Vaccine (2 - PCV) 05/10/2020    Diabetic retinal exam  08/12/2021    Diabetic Alb to Cr ratio (uACR) test  11/16/2023    Lipids  11/16/2023    GFR test (Diabetes, CKD 3-4, OR last GFR 15-59)  11/16/2023    Diabetic foot exam  12/14/2023    HIV screen  12/14/2027 (Originally 10/7/2009)    A1C test (Diabetic or Prediabetic)  12/13/2024    Depression Monitoring  01/02/2025    DTaP/Tdap/Td vaccine (3 - Td or Tdap) 05/10/2029    Flu vaccine  Completed    Hepatitis C screen  Completed    Hepatitis A vaccine  Aged Out    Hib vaccine  Aged Out    HPV vaccine  Aged Out    Meningococcal (ACWY) vaccine  Aged Out    Depression Screen  Discontinued       Hemoglobin A1C (%)   Date Value   12/13/2023 7.9 (H)   09/08/2023 7.5 (H)   06/02/2023 6.5             ( goal A1C is < 7)   No components found for: \"LABMICR\"  LDL Cholesterol (mg/dL)   Date Value   11/16/2022 142 (H)   07/16/2020 66     LDL Calculated (mg/dL)   Date Value   07/30/2019 120.6   07/25/2019 77.8       (goal LDL is <100)   AST (U/L)   Date Value   11/16/2022 24     ALT (U/L)   Date Value   11/16/2022 34     BUN (mg/dL)   Date Value   11/16/2022 15     BP Readings from Last 3 Encounters:   01/02/24 128/76   12/13/23 122/82   09/08/23 124/84          (goal 120/80)    All Future Testing planned in [facility]  Lab Frequency Next Occurrence   Lipid Panel Once 12/13/2023   Comprehensive Metabolic Panel, Fasting Once 12/13/2023   TSH With Reflex Ft4 Once 12/13/2023   Microalbumin, Ur Once 12/13/2023               Patient Active Problem List:

## 2024-03-18 DIAGNOSIS — Z79.4 TYPE 2 DIABETES MELLITUS WITH HYPERGLYCEMIA, WITH LONG-TERM CURRENT USE OF INSULIN (HCC): ICD-10-CM

## 2024-03-18 DIAGNOSIS — E11.65 TYPE 2 DIABETES MELLITUS WITH HYPERGLYCEMIA, WITH LONG-TERM CURRENT USE OF INSULIN (HCC): ICD-10-CM

## 2024-03-18 RX ORDER — DULAGLUTIDE 4.5 MG/.5ML
INJECTION, SOLUTION SUBCUTANEOUS
Qty: 8 ML | Refills: 0 | Status: SHIPPED | OUTPATIENT
Start: 2024-03-18

## 2024-03-19 DIAGNOSIS — F41.8 MIXED ANXIETY AND DEPRESSIVE DISORDER: ICD-10-CM

## 2024-03-19 RX ORDER — ESCITALOPRAM OXALATE 10 MG/1
10 TABLET ORAL DAILY
Qty: 30 TABLET | Refills: 0 | Status: SHIPPED | OUTPATIENT
Start: 2024-03-19

## 2024-03-21 DIAGNOSIS — Z79.4 TYPE 2 DIABETES MELLITUS WITH HYPERGLYCEMIA, WITH LONG-TERM CURRENT USE OF INSULIN (HCC): ICD-10-CM

## 2024-03-21 DIAGNOSIS — E11.65 TYPE 2 DIABETES MELLITUS WITH HYPERGLYCEMIA, WITH LONG-TERM CURRENT USE OF INSULIN (HCC): ICD-10-CM

## 2024-04-23 DIAGNOSIS — F41.8 MIXED ANXIETY AND DEPRESSIVE DISORDER: ICD-10-CM

## 2024-04-23 RX ORDER — ESCITALOPRAM OXALATE 10 MG/1
10 TABLET ORAL DAILY
Qty: 30 TABLET | Refills: 2 | Status: SHIPPED | OUTPATIENT
Start: 2024-04-23

## 2024-05-04 DIAGNOSIS — I10 ESSENTIAL HYPERTENSION: ICD-10-CM

## 2024-05-04 RX ORDER — LISINOPRIL 10 MG/1
TABLET ORAL
Qty: 90 TABLET | Refills: 0 | Status: SHIPPED | OUTPATIENT
Start: 2024-05-04

## 2024-05-14 DIAGNOSIS — E11.65 TYPE 2 DIABETES MELLITUS WITH HYPERGLYCEMIA, WITH LONG-TERM CURRENT USE OF INSULIN (HCC): ICD-10-CM

## 2024-05-14 DIAGNOSIS — Z79.4 TYPE 2 DIABETES MELLITUS WITH HYPERGLYCEMIA, WITH LONG-TERM CURRENT USE OF INSULIN (HCC): ICD-10-CM

## 2024-05-14 RX ORDER — DULAGLUTIDE 4.5 MG/.5ML
INJECTION, SOLUTION SUBCUTANEOUS
Qty: 8 ML | Refills: 0 | Status: SHIPPED | OUTPATIENT
Start: 2024-05-14

## 2024-05-29 ENCOUNTER — TELEPHONE (OUTPATIENT)
Dept: PRIMARY CARE CLINIC | Age: 29
End: 2024-05-29

## 2024-05-29 DIAGNOSIS — E11.65 TYPE 2 DIABETES MELLITUS WITH HYPERGLYCEMIA, WITH LONG-TERM CURRENT USE OF INSULIN (HCC): Primary | ICD-10-CM

## 2024-05-29 DIAGNOSIS — Z79.4 TYPE 2 DIABETES MELLITUS WITH HYPERGLYCEMIA, WITH LONG-TERM CURRENT USE OF INSULIN (HCC): Primary | ICD-10-CM

## 2024-05-29 RX ORDER — TIRZEPATIDE 5 MG/.5ML
5 INJECTION, SOLUTION SUBCUTANEOUS WEEKLY
Qty: 4 ADJUSTABLE DOSE PRE-FILLED PEN SYRINGE | Refills: 0 | Status: SHIPPED | OUTPATIENT
Start: 2024-05-29

## 2024-05-29 NOTE — TELEPHONE ENCOUNTER
Please have patient contact the pharmacy to see if they have the 3.0 mg.  If not we will need to send an alternative.

## 2024-05-29 NOTE — TELEPHONE ENCOUNTER
Pt's wife called into office stating pt's Trulicity 4.5 mg dose is on backorder and she called different pharmacies already, also do not have the dose.   She is asking if PCP need to change dose or what do they need to do regarding the backorder.    Please advise  Thanks

## 2024-06-01 ENCOUNTER — PATIENT MESSAGE (OUTPATIENT)
Dept: PRIMARY CARE CLINIC | Age: 29
End: 2024-06-01

## 2024-06-01 DIAGNOSIS — Z79.4 TYPE 2 DIABETES MELLITUS WITH HYPERGLYCEMIA, WITH LONG-TERM CURRENT USE OF INSULIN (HCC): Primary | ICD-10-CM

## 2024-06-01 DIAGNOSIS — E11.65 TYPE 2 DIABETES MELLITUS WITH HYPERGLYCEMIA, WITH LONG-TERM CURRENT USE OF INSULIN (HCC): Primary | ICD-10-CM

## 2024-06-03 ENCOUNTER — TELEPHONE (OUTPATIENT)
Dept: PRIMARY CARE CLINIC | Age: 29
End: 2024-06-03

## 2024-06-03 NOTE — TELEPHONE ENCOUNTER
From: Jaya Cruz  To: Gen Eduardo  Sent: 6/1/2024 10:48 AM EDT  Subject: Insurance.    Insurance won’t cover the new script. Is there any way to just call in the lower dose of trulicity to [facility] I believe last time I talked to them they had 125.

## 2024-06-19 DIAGNOSIS — E78.5 DYSLIPIDEMIA: ICD-10-CM

## 2024-06-19 DIAGNOSIS — R05.8 POST-VIRAL COUGH SYNDROME: ICD-10-CM

## 2024-06-19 DIAGNOSIS — F41.8 MIXED ANXIETY AND DEPRESSIVE DISORDER: ICD-10-CM

## 2024-06-20 RX ORDER — LORATADINE 10 MG/1
TABLET ORAL DAILY
Qty: 30 TABLET | Refills: 0 | OUTPATIENT
Start: 2024-06-20

## 2024-06-20 RX ORDER — ESCITALOPRAM OXALATE 10 MG/1
10 TABLET ORAL DAILY
Qty: 30 TABLET | Refills: 2 | Status: SHIPPED | OUTPATIENT
Start: 2024-06-20

## 2024-06-20 RX ORDER — LORATADINE 10 MG/1
10 TABLET ORAL DAILY
Qty: 30 TABLET | Refills: 3 | Status: SHIPPED | OUTPATIENT
Start: 2024-06-20

## 2024-06-20 RX ORDER — PRAVASTATIN SODIUM 20 MG
20 TABLET ORAL DAILY
Qty: 30 TABLET | Refills: 3 | Status: SHIPPED | OUTPATIENT
Start: 2024-06-20

## 2024-07-12 DIAGNOSIS — E11.65 TYPE 2 DIABETES MELLITUS WITH HYPERGLYCEMIA, WITH LONG-TERM CURRENT USE OF INSULIN (HCC): ICD-10-CM

## 2024-07-12 DIAGNOSIS — Z79.4 TYPE 2 DIABETES MELLITUS WITH HYPERGLYCEMIA, WITH LONG-TERM CURRENT USE OF INSULIN (HCC): ICD-10-CM

## 2024-07-12 RX ORDER — DULAGLUTIDE 4.5 MG/.5ML
INJECTION, SOLUTION SUBCUTANEOUS
Qty: 8 ML | Refills: 0 | Status: SHIPPED | OUTPATIENT
Start: 2024-07-12

## 2024-08-03 DIAGNOSIS — I10 ESSENTIAL HYPERTENSION: ICD-10-CM

## 2024-08-05 RX ORDER — LISINOPRIL 10 MG/1
TABLET ORAL
Qty: 90 TABLET | Refills: 0 | Status: SHIPPED | OUTPATIENT
Start: 2024-08-05

## 2024-08-21 DIAGNOSIS — E11.65 TYPE 2 DIABETES MELLITUS WITH HYPERGLYCEMIA, WITH LONG-TERM CURRENT USE OF INSULIN (HCC): ICD-10-CM

## 2024-08-21 DIAGNOSIS — Z79.4 TYPE 2 DIABETES MELLITUS WITH HYPERGLYCEMIA, WITH LONG-TERM CURRENT USE OF INSULIN (HCC): ICD-10-CM

## 2024-10-24 DIAGNOSIS — E11.65 TYPE 2 DIABETES MELLITUS WITH HYPERGLYCEMIA, WITH LONG-TERM CURRENT USE OF INSULIN (HCC): ICD-10-CM

## 2024-10-24 DIAGNOSIS — Z79.4 TYPE 2 DIABETES MELLITUS WITH HYPERGLYCEMIA, WITH LONG-TERM CURRENT USE OF INSULIN (HCC): ICD-10-CM

## 2024-10-25 RX ORDER — DULAGLUTIDE 4.5 MG/.5ML
4.5 INJECTION, SOLUTION SUBCUTANEOUS WEEKLY
Qty: 16 ML | Refills: 0 | Status: SHIPPED | OUTPATIENT
Start: 2024-10-25

## 2024-11-01 ENCOUNTER — TELEPHONE (OUTPATIENT)
Dept: PRIMARY CARE CLINIC | Age: 29
End: 2024-11-01

## 2024-11-01 DIAGNOSIS — Z79.4 TYPE 2 DIABETES MELLITUS WITH HYPERGLYCEMIA, WITH LONG-TERM CURRENT USE OF INSULIN (HCC): ICD-10-CM

## 2024-11-01 DIAGNOSIS — E11.65 TYPE 2 DIABETES MELLITUS WITH HYPERGLYCEMIA, WITH LONG-TERM CURRENT USE OF INSULIN (HCC): ICD-10-CM

## 2024-11-01 DIAGNOSIS — F41.8 MIXED ANXIETY AND DEPRESSIVE DISORDER: ICD-10-CM

## 2024-11-01 RX ORDER — ESCITALOPRAM OXALATE 10 MG/1
10 TABLET ORAL DAILY
Qty: 90 TABLET | Refills: 0 | Status: SHIPPED | OUTPATIENT
Start: 2024-11-01

## 2024-11-01 NOTE — TELEPHONE ENCOUNTER
Jaya Cruz is calling to request a refill on the following medication(s):    Medication Request:  Requested Prescriptions     Pending Prescriptions Disp Refills    escitalopram (LEXAPRO) 10 MG tablet [Pharmacy Med Name: Escitalopram Oxalate 10 MG Oral Tablet] 30 tablet 0     Sig: Take 1 tablet by mouth once daily    metFORMIN (GLUCOPHAGE) 500 MG tablet [Pharmacy Med Name: metFORMIN HCl 500 MG Oral Tablet] 180 tablet 0     Sig: TAKE 1 TABLET BY MOUTH TWICE DAILY WITH MEALS       Last Visit Date (If Applicable):  12/13/2023    Next Visit Date:    1/10/2025

## 2024-11-19 DIAGNOSIS — E78.5 DYSLIPIDEMIA: ICD-10-CM

## 2024-11-19 RX ORDER — PRAVASTATIN SODIUM 20 MG
20 TABLET ORAL DAILY
Qty: 30 TABLET | Refills: 0 | Status: SHIPPED | OUTPATIENT
Start: 2024-11-19

## 2024-11-19 NOTE — TELEPHONE ENCOUNTER
LAST VISIT:   12/13/2023     Future Appointments   Date Time Provider Department Center   1/10/2025  3:30 PM Gen Eduardo PA-C [place] [facility]

## 2024-12-08 DIAGNOSIS — E78.5 DYSLIPIDEMIA: ICD-10-CM

## 2024-12-08 DIAGNOSIS — R05.8 POST-VIRAL COUGH SYNDROME: ICD-10-CM

## 2024-12-08 DIAGNOSIS — Z79.4 TYPE 2 DIABETES MELLITUS WITH HYPERGLYCEMIA, WITH LONG-TERM CURRENT USE OF INSULIN (HCC): ICD-10-CM

## 2024-12-08 DIAGNOSIS — E11.65 TYPE 2 DIABETES MELLITUS WITH HYPERGLYCEMIA, WITH LONG-TERM CURRENT USE OF INSULIN (HCC): ICD-10-CM

## 2024-12-08 DIAGNOSIS — F41.8 MIXED ANXIETY AND DEPRESSIVE DISORDER: ICD-10-CM

## 2024-12-09 RX ORDER — PRAVASTATIN SODIUM 20 MG
20 TABLET ORAL DAILY
Qty: 30 TABLET | Refills: 0 | Status: SHIPPED | OUTPATIENT
Start: 2024-12-09

## 2024-12-09 RX ORDER — ESCITALOPRAM OXALATE 10 MG/1
10 TABLET ORAL DAILY
Qty: 90 TABLET | Refills: 0 | Status: SHIPPED | OUTPATIENT
Start: 2024-12-09

## 2024-12-09 RX ORDER — LORATADINE 10 MG/1
10 TABLET ORAL DAILY
Qty: 30 TABLET | Refills: 0 | Status: SHIPPED | OUTPATIENT
Start: 2024-12-09

## 2025-01-04 DIAGNOSIS — E78.5 DYSLIPIDEMIA: ICD-10-CM

## 2025-01-06 RX ORDER — PRAVASTATIN SODIUM 20 MG
20 TABLET ORAL DAILY
Qty: 30 TABLET | Refills: 0 | OUTPATIENT
Start: 2025-01-06

## 2025-01-10 ENCOUNTER — OFFICE VISIT (OUTPATIENT)
Dept: PRIMARY CARE CLINIC | Age: 30
End: 2025-01-10

## 2025-01-10 VITALS
HEART RATE: 86 BPM | SYSTOLIC BLOOD PRESSURE: 132 MMHG | BODY MASS INDEX: 34.38 KG/M2 | OXYGEN SATURATION: 96 % | HEIGHT: 71 IN | DIASTOLIC BLOOD PRESSURE: 80 MMHG | WEIGHT: 245.6 LBS

## 2025-01-10 DIAGNOSIS — Z79.4 TYPE 2 DIABETES MELLITUS WITH HYPERGLYCEMIA, WITH LONG-TERM CURRENT USE OF INSULIN (HCC): ICD-10-CM

## 2025-01-10 DIAGNOSIS — E11.65 TYPE 2 DIABETES MELLITUS WITH HYPERGLYCEMIA, WITH LONG-TERM CURRENT USE OF INSULIN (HCC): ICD-10-CM

## 2025-01-10 DIAGNOSIS — Z00.00 ANNUAL PHYSICAL EXAM: Primary | ICD-10-CM

## 2025-01-10 DIAGNOSIS — I10 ESSENTIAL HYPERTENSION: ICD-10-CM

## 2025-01-10 DIAGNOSIS — E78.5 DYSLIPIDEMIA: ICD-10-CM

## 2025-01-10 LAB — HBA1C MFR BLD: 12.3 %

## 2025-01-10 RX ORDER — PRAVASTATIN SODIUM 20 MG
20 TABLET ORAL DAILY
Qty: 90 TABLET | Refills: 1 | Status: SHIPPED | OUTPATIENT
Start: 2025-01-10

## 2025-01-10 RX ORDER — ACYCLOVIR 400 MG/1
TABLET ORAL
Qty: 3 EACH | Refills: 2 | Status: SHIPPED | OUTPATIENT
Start: 2025-01-10

## 2025-01-10 RX ORDER — LISINOPRIL 10 MG/1
10 TABLET ORAL DAILY
Qty: 90 TABLET | Refills: 1 | Status: SHIPPED | OUTPATIENT
Start: 2025-01-10

## 2025-01-10 SDOH — ECONOMIC STABILITY: FOOD INSECURITY: WITHIN THE PAST 12 MONTHS, THE FOOD YOU BOUGHT JUST DIDN'T LAST AND YOU DIDN'T HAVE MONEY TO GET MORE.: NEVER TRUE

## 2025-01-10 SDOH — ECONOMIC STABILITY: FOOD INSECURITY: WITHIN THE PAST 12 MONTHS, YOU WORRIED THAT YOUR FOOD WOULD RUN OUT BEFORE YOU GOT MONEY TO BUY MORE.: NEVER TRUE

## 2025-01-10 ASSESSMENT — PATIENT HEALTH QUESTIONNAIRE - PHQ9
SUM OF ALL RESPONSES TO PHQ QUESTIONS 1-9: 2
2. FEELING DOWN, DEPRESSED OR HOPELESS: NOT AT ALL
6. FEELING BAD ABOUT YOURSELF - OR THAT YOU ARE A FAILURE OR HAVE LET YOURSELF OR YOUR FAMILY DOWN: NOT AT ALL
9. THOUGHTS THAT YOU WOULD BE BETTER OFF DEAD, OR OF HURTING YOURSELF: NOT AT ALL
5. POOR APPETITE OR OVEREATING: NOT AT ALL
3. TROUBLE FALLING OR STAYING ASLEEP: SEVERAL DAYS
SUM OF ALL RESPONSES TO PHQ QUESTIONS 1-9: 2
1. LITTLE INTEREST OR PLEASURE IN DOING THINGS: NOT AT ALL
SUM OF ALL RESPONSES TO PHQ QUESTIONS 1-9: 2
4. FEELING TIRED OR HAVING LITTLE ENERGY: NOT AT ALL
10. IF YOU CHECKED OFF ANY PROBLEMS, HOW DIFFICULT HAVE THESE PROBLEMS MADE IT FOR YOU TO DO YOUR WORK, TAKE CARE OF THINGS AT HOME, OR GET ALONG WITH OTHER PEOPLE: NOT DIFFICULT AT ALL
SUM OF ALL RESPONSES TO PHQ QUESTIONS 1-9: 2
7. TROUBLE CONCENTRATING ON THINGS, SUCH AS READING THE NEWSPAPER OR WATCHING TELEVISION: SEVERAL DAYS
SUM OF ALL RESPONSES TO PHQ9 QUESTIONS 1 & 2: 0
8. MOVING OR SPEAKING SO SLOWLY THAT OTHER PEOPLE COULD HAVE NOTICED. OR THE OPPOSITE, BEING SO FIGETY OR RESTLESS THAT YOU HAVE BEEN MOVING AROUND A LOT MORE THAN USUAL: NOT AT ALL

## 2025-01-10 NOTE — PROGRESS NOTES
1.803 m (5' 11\")   Wt 111.4 kg (245 lb 9.6 oz)   SpO2 96%   BMI 34.25 kg/m²     Assessment:       ICD-10-CM    1. Annual physical exam  Z00.00       2. Dyslipidemia  E78.5 pravastatin (PRAVACHOL) 20 MG tablet      3. Type 2 diabetes mellitus with hyperglycemia, with long-term current use of insulin (HCC)  E11.65 Albumin/Creatinine Ratio, Urine    Z79.4 Lipid Panel     Comprehensive Metabolic Panel, Fasting     Continuous Glucose Sensor (DEXCOM G7 SENSOR) MISC     POCT glycosylated hemoglobin (Hb A1C)      4. Essential hypertension  I10 lisinopril (PRINIVIL;ZESTRIL) 10 MG tablet               Plan:   Assessment & Plan   1.  Patient presents for annual physical exam.  We reviewed health maintenance and screenings.  Plan update labs.  2.  Patient will continue pravastatin 20 mg daily for management of dyslipidemia.  3.  A1c is significantly increased to 12.3.  He has been off Trulicity for the last month.  Plan to restart medication.  We discussed the importance of monitoring dietary habits for processed sugars, carbohydrates, starches.  Advised on CGM for close monitoring.  If glucose is not improving we will add on third medication.  He is agreeable to plan.  4.  Hypertension is stable.  Continue lisinopril 10 mg daily.    Return in about 4 weeks (around 2/7/2025) for medication recheck, diabetes, blood pressure.    Orders Placed This Encounter   Procedures    Albumin/Creatinine Ratio, Urine     Standing Status:   Future     Standing Expiration Date:   1/10/2026    Lipid Panel     Standing Status:   Future     Standing Expiration Date:   1/10/2026    Comprehensive Metabolic Panel, Fasting     Standing Status:   Future     Standing Expiration Date:   1/10/2026    POCT glycosylated hemoglobin (Hb A1C)         Patient given educational materials - see patient instructions.  Discussed use, benefit, and side effects of prescribedmedications.  All patient questions answered. Pt voiced understanding. Reviewed health

## 2025-03-11 ENCOUNTER — TELEPHONE (OUTPATIENT)
Dept: PRIMARY CARE CLINIC | Age: 30
End: 2025-03-11

## 2025-03-11 DIAGNOSIS — M25.512 CHRONIC LEFT SHOULDER PAIN: Primary | ICD-10-CM

## 2025-03-11 DIAGNOSIS — G89.29 CHRONIC LEFT SHOULDER PAIN: Primary | ICD-10-CM

## 2025-03-11 NOTE — TELEPHONE ENCOUNTER
Patient called requesting referral to ortho for left shoulder that is constant pain and popping in and out of place (states you had seen him previously for this)  He also is having same problem with his right elbow.  Would like referral for both.  Please advise.

## 2025-03-12 ENCOUNTER — TELEPHONE (OUTPATIENT)
Dept: ORTHOPEDIC SURGERY | Age: 30
End: 2025-03-12

## 2025-03-12 ENCOUNTER — OFFICE VISIT (OUTPATIENT)
Dept: ORTHOPEDIC SURGERY | Age: 30
End: 2025-03-12
Payer: MEDICAID

## 2025-03-12 VITALS — WEIGHT: 245 LBS | HEIGHT: 71 IN | BODY MASS INDEX: 34.3 KG/M2 | RESPIRATION RATE: 14 BRPM

## 2025-03-12 DIAGNOSIS — T15.90XA FOREIGN BODY IN EYE, UNSPECIFIED LATERALITY, INITIAL ENCOUNTER: ICD-10-CM

## 2025-03-12 DIAGNOSIS — M25.521 RIGHT ELBOW PAIN: ICD-10-CM

## 2025-03-12 DIAGNOSIS — M25.312 INSTABILITY OF LEFT SHOULDER JOINT: Primary | ICD-10-CM

## 2025-03-12 PROCEDURE — 99203 OFFICE O/P NEW LOW 30 MIN: CPT | Performed by: ORTHOPAEDIC SURGERY

## 2025-03-12 NOTE — TELEPHONE ENCOUNTER
Jaya is scheduled for an MRI Left Shoulder Arthrogram on 3/26 in [place].  I have the MRI order, but if it is an arthrogram we also need that order placed, which is [id number] and it must say which shoulder in the comments.  He is also a  so will need pre MRI orbit xrays if you can please enter [id number].  Thanks!

## 2025-03-14 NOTE — PROGRESS NOTES
ORTHOPEDIC PATIENT EVALUATION      HPI / Chief Complaint  Jaya Cruz is a 29 y.o. male who presents for evaluation of his left shoulder and right elbow.  He has been seen in the past for the left shoulder where he has been dealing with recurrent instability.  When he was last seen in my clinic he was sent to physical therapy.  Unfortunately his symptoms have persisted.  He also presents today because of pain in the right elbow that has been ongoing for the past month.  No precipitating trauma or injury.  He describes having a constant \"pressure\" in a elbow.  It feels like it needs to pop and when it does it is quite painful but it does subsequently result in some relief.  No associated numbness or tingling.    Past Medical History  Jaya  has a past medical history of Essential hypertension, Gastrointestinal hemorrhage with hematemesis, GERD (gastroesophageal reflux disease), GI bleed, Hyperlipidemia, and Hypothyroidism.    Past Surgical History  Jaya  has a past surgical history that includes Appendectomy (2013); Arm Surgery (Left, 2006); Upper gastrointestinal endoscopy (2014); Upper gastrointestinal endoscopy (05/03/2016); Shoulder arthroscopy (Left, 06/21/2018); and pr office/outpt visit,procedure only (Left, 6/21/2018).    Current Medications  Current Outpatient Medications   Medication Sig Dispense Refill    Continuous Glucose Sensor (DEXCOM G7 SENSOR) MISC Apply every 10 days for CGM 3 each 2    pravastatin (PRAVACHOL) 20 MG tablet Take 1 tablet by mouth daily 90 tablet 1    lisinopril (PRINIVIL;ZESTRIL) 10 MG tablet Take 1 tablet by mouth daily 90 tablet 1    escitalopram (LEXAPRO) 10 MG tablet Take 1 tablet by mouth once daily 90 tablet 0    EQ ALL DAY ALLERGY RELIEF 10 MG tablet Take 1 tablet by mouth once daily 30 tablet 0    metFORMIN (GLUCOPHAGE) 500 MG tablet TAKE 1 TABLET BY MOUTH TWICE DAILY WITH MEALS 180 tablet 0    Dulaglutide (TRULICITY) 4.5 MG/0.5ML SOAJ Inject 4.5 mg into the skin once

## 2025-04-01 ENCOUNTER — TELEPHONE (OUTPATIENT)
Dept: ORTHOPEDIC SURGERY | Age: 30
End: 2025-04-01

## 2025-04-01 NOTE — TELEPHONE ENCOUNTER
Jaya Kuhn called in stating his MRI arthrogram was denied because he has not completed PT and he wasn't sure if he needed to come to his appt with you tmrw, 4/2/25. He is asking if you would like him to just try PT again or if you wanted to see him in office.

## 2025-04-02 DIAGNOSIS — M25.312 INSTABILITY OF LEFT SHOULDER JOINT: Primary | ICD-10-CM

## 2025-04-02 NOTE — TELEPHONE ENCOUNTER
Spoke to Dr. Girard, new order for PT is placed and patient does not need to come in for appt 4/2/25.

## 2025-04-28 DIAGNOSIS — F41.8 MIXED ANXIETY AND DEPRESSIVE DISORDER: ICD-10-CM

## 2025-04-28 RX ORDER — ESCITALOPRAM OXALATE 10 MG/1
10 TABLET ORAL DAILY
Qty: 90 TABLET | Refills: 0 | OUTPATIENT
Start: 2025-04-28

## 2025-05-05 DIAGNOSIS — R05.8 POST-VIRAL COUGH SYNDROME: ICD-10-CM

## 2025-05-05 DIAGNOSIS — Z79.4 TYPE 2 DIABETES MELLITUS WITH HYPERGLYCEMIA, WITH LONG-TERM CURRENT USE OF INSULIN (HCC): ICD-10-CM

## 2025-05-05 DIAGNOSIS — I10 ESSENTIAL HYPERTENSION: ICD-10-CM

## 2025-05-05 DIAGNOSIS — E11.65 TYPE 2 DIABETES MELLITUS WITH HYPERGLYCEMIA, WITH LONG-TERM CURRENT USE OF INSULIN (HCC): ICD-10-CM

## 2025-05-06 RX ORDER — LISINOPRIL 10 MG/1
10 TABLET ORAL DAILY
Qty: 90 TABLET | Refills: 0 | OUTPATIENT
Start: 2025-05-06

## 2025-05-06 RX ORDER — LORATADINE 10 MG/1
10 TABLET ORAL DAILY
Qty: 30 TABLET | Refills: 0 | OUTPATIENT
Start: 2025-05-06

## 2025-07-06 DIAGNOSIS — I10 ESSENTIAL HYPERTENSION: ICD-10-CM

## 2025-07-07 RX ORDER — LISINOPRIL 10 MG/1
10 TABLET ORAL DAILY
Qty: 90 TABLET | Refills: 0 | Status: SHIPPED | OUTPATIENT
Start: 2025-07-07

## (undated) DEVICE — 3 ML SYRINGE LUER-LOCK TIP: Brand: MONOJECT

## (undated) DEVICE — CANNULA IV 18GA L15IN BLNT FILL LUERLOCK HUB MJCT

## (undated) DEVICE — SUTURE MCRYL SZ 3-0 L18IN ABSRB UD L19MM PS-2 3/8 CIR PRIM Y497G

## (undated) DEVICE — SKIN AFFIX SURG ADHESIVE 72/CS 0.55ML: Brand: MEDLINE

## (undated) DEVICE — ELECTRODE ELECSURG NDL 2.8 INX7.2 CM COAT INSUL EDGE

## (undated) DEVICE — STOCKINETTE,IMPERVIOUS,12X48,STERILE: Brand: MEDLINE

## (undated) DEVICE — GLOVE ORANGE PI 7 1/2   MSG9075

## (undated) DEVICE — SOLUTION IV IRRIG POUR BRL 0.9% SODIUM CHL 2F7124

## (undated) DEVICE — GLOVE ORANGE PI 8   MSG9080

## (undated) DEVICE — GOWN,AURORA,NONREINFORCED,LARGE: Brand: MEDLINE

## (undated) DEVICE — GLOVE ORANGE PI 8 1/2   MSG9085

## (undated) DEVICE — BANDAGE COBAN 4 IN COMPR W4INXL5YD FOAM COHESIVE QUIK STK SELF ADH SFT

## (undated) DEVICE — GEL US 20GM NONIRRITATING OVERWRAPPED FILE PCH TRNSMIT

## (undated) DEVICE — GARMENT,MEDLINE,DVT,INT,CALF,MED, GEN2: Brand: MEDLINE

## (undated) DEVICE — SCALPEL 11 BLADE

## (undated) DEVICE — Z INACTIVE USE 2660664 SOLUTION IRRIG 3000ML 0.9% SOD CHL USP UROMATIC PLAS CONT

## (undated) DEVICE — SUTURE VIC + ABS BR UD X1 2-0 27IN VCP459H

## (undated) DEVICE — Z DISCONTINUED USE 2423037 APPLICATOR MEDICATED 3 CC CHLORHEXIDINE GLUC 2% CHLORAPREP

## (undated) DEVICE — SYRINGE IRRIG 60ML SFT PLIABLE BLB EZ TO GRP 1 HND USE W/

## (undated) DEVICE — SYRINGE, LUER LOCK, 10ML: Brand: MEDLINE

## (undated) DEVICE — PENCIL ES L3M BTTN SWCH HOLSTER W/ BLDE ELECTRD EDGE

## (undated) DEVICE — NEEDLE ECHOGENIC 20GA L4IN INSUL W/ 30DEG BVL EXTN SET

## (undated) DEVICE — ELECTRODE PT RET AD L9FT HI MOIST COND ADH HYDRGEL CORDED

## (undated) DEVICE — SUTURE FIBERWIRE SZ 2 W/ TAPERED NEEDLE BLUE L38IN NONABSORB BLU L26.5MM 1/2 CIRCLE AR7200

## (undated) DEVICE — PACK PROCEDURE SURG SVMMC SHLDR ARTHRO PK

## (undated) DEVICE — YANKAUER,FLEXIBLE HANDLE,REGLR CAPACITY: Brand: MEDLINE INDUSTRIES, INC.

## (undated) DEVICE — CHLORAPREP 26ML ORANGE

## (undated) DEVICE — SUTURE ETHBND 2 L30IN NONABSORBABLE GRN WHT LT GRN MO-7 D8793

## (undated) DEVICE — SUTURE VCRL + SZ 0 L27IN ABSRB WHT CT-2 1/2 CIR TAPERPOINT VCP270H

## (undated) DEVICE — SLING ARM L L17 1/2IN D8.5IN COT POLY DLX W/ SHLDR PD

## (undated) DEVICE — Z DISCONTINUED USE 2275686 GLOVE SURG SZ 8 L12IN FNGR THK13MIL WHT ISOLEX POLYISOPRENE